# Patient Record
Sex: FEMALE | Race: WHITE | NOT HISPANIC OR LATINO | Employment: UNEMPLOYED | URBAN - NONMETROPOLITAN AREA
[De-identification: names, ages, dates, MRNs, and addresses within clinical notes are randomized per-mention and may not be internally consistent; named-entity substitution may affect disease eponyms.]

---

## 2017-07-21 ENCOUNTER — HISTORY (OUTPATIENT)
Dept: EMERGENCY MEDICINE | Facility: OTHER | Age: 27
End: 2017-07-21

## 2017-07-27 ENCOUNTER — HOSPITAL ENCOUNTER (OUTPATIENT)
Dept: RADIOLOGY | Facility: OTHER | Age: 27
End: 2017-07-27
Attending: FAMILY MEDICINE

## 2017-07-27 ENCOUNTER — OFFICE VISIT - GICH (OUTPATIENT)
Dept: FAMILY MEDICINE | Facility: OTHER | Age: 27
End: 2017-07-27

## 2017-07-27 DIAGNOSIS — L24.7 IRRITANT CONTACT DERMATITIS DUE TO PLANTS, EXCEPT FOOD: ICD-10-CM

## 2017-07-27 DIAGNOSIS — M54.40 LUMBAGO WITH SCIATICA: ICD-10-CM

## 2017-07-27 ASSESSMENT — PATIENT HEALTH QUESTIONNAIRE - PHQ9: SUM OF ALL RESPONSES TO PHQ QUESTIONS 1-9: 0

## 2017-08-11 ENCOUNTER — HOSPITAL ENCOUNTER (OUTPATIENT)
Dept: PHYSICAL THERAPY | Facility: OTHER | Age: 27
Setting detail: THERAPIES SERIES
End: 2017-08-11
Attending: FAMILY MEDICINE

## 2017-08-11 DIAGNOSIS — M54.40 LUMBAGO WITH SCIATICA: ICD-10-CM

## 2017-08-18 ENCOUNTER — HOSPITAL ENCOUNTER (OUTPATIENT)
Dept: PHYSICAL THERAPY | Facility: OTHER | Age: 27
Setting detail: THERAPIES SERIES
End: 2017-08-18
Attending: FAMILY MEDICINE

## 2017-08-25 ENCOUNTER — HOSPITAL ENCOUNTER (OUTPATIENT)
Dept: PHYSICAL THERAPY | Facility: OTHER | Age: 27
Setting detail: THERAPIES SERIES
End: 2017-08-25
Attending: FAMILY MEDICINE

## 2017-09-01 ENCOUNTER — HOSPITAL ENCOUNTER (OUTPATIENT)
Dept: PHYSICAL THERAPY | Facility: OTHER | Age: 27
Setting detail: THERAPIES SERIES
End: 2017-09-01
Attending: FAMILY MEDICINE

## 2017-09-08 ENCOUNTER — HOSPITAL ENCOUNTER (OUTPATIENT)
Dept: PHYSICAL THERAPY | Facility: OTHER | Age: 27
Setting detail: THERAPIES SERIES
End: 2017-09-08
Attending: FAMILY MEDICINE

## 2017-09-11 ENCOUNTER — HOSPITAL ENCOUNTER (OUTPATIENT)
Dept: PHYSICAL THERAPY | Facility: OTHER | Age: 27
Setting detail: THERAPIES SERIES
End: 2017-09-11
Attending: FAMILY MEDICINE

## 2017-09-29 ENCOUNTER — HOSPITAL ENCOUNTER (OUTPATIENT)
Dept: PHYSICAL THERAPY | Facility: OTHER | Age: 27
Setting detail: THERAPIES SERIES
End: 2017-09-29
Attending: FAMILY MEDICINE

## 2017-10-05 ENCOUNTER — HOSPITAL ENCOUNTER (OUTPATIENT)
Dept: PHYSICAL THERAPY | Facility: OTHER | Age: 27
Setting detail: THERAPIES SERIES
End: 2017-10-05
Attending: FAMILY MEDICINE

## 2017-11-06 ENCOUNTER — AMBULATORY - GICH (OUTPATIENT)
Dept: FAMILY MEDICINE | Facility: OTHER | Age: 27
End: 2017-11-06

## 2017-11-06 DIAGNOSIS — Z23 ENCOUNTER FOR IMMUNIZATION: ICD-10-CM

## 2017-12-27 NOTE — PROGRESS NOTES
Patient Information     Patient Name MRN Sex     Yohana Robertson [id number] Female 1990      Progress Notes by Kori Berry at 2017  3:52 PM     Author:  Kori Berry  Service:  (none) Author Type:  PT- Physical Therapy Assistant     Filed:  2017  5:13 PM  Date of Service:  2017  3:52 PM Status:  Addendum     :  Kori Berry (PT- Physical Therapy Assistant)        Related Notes: Original Note by Kori Berry (PT- Physical Therapy Assistant) filed at 2017  5:13 PM            [facility]  Outpatient PT - Daily Note    Date of Service: 2017     Visit 6/10     Patient Name: Yohana Robertson   YOB: 1990   Referring MD/Provider: Dr. Horner  Medical and Treatment Diagnosis: (not recorded)  PT Treatment Diagnosis: Low back pain with radicular symptoms  Insurance: Other: IM CARE BASIC  Start of Service: 17  Certification Dates: Start of Service: 17  Medicare/MA Re-Cert Due: 10/06/17     Subjective      Current Status: Yohana reports she felt much better after last appointment. Reports that she almost undid the progress when working on the skirting on her trailer house. Reports she was able to go on a couple mile bike ride with her daughter for  open house.     Rates pain: 1/10  Describes pain: Dull achy in the low back.   Locates pain: Low back - left side     Objective    Items left blank indicate that the test was inappropriate or not meaningful at the time of evaluation and therefore not performed.    ROM/Strength:     Cerv Thor Lumbar Myotomes    L    R     L    R   Flexion   Mod limited with pain C4 Shoulder Elev.   L2 Hip Flexion 5/5 5/5   Extension   Avoids movement, major limited C5 Shoulder Abd.   L3 Knee Ext.      5/5 5/5   Left Lat. Flex   WNL  More painful than right  C6 Elbow Flexion   L4 Ankle DF 5/5 5/5   Right Lat. Flex   WNL C7 Elbow Ext.   L5 1st MTP Ext.     Left Rotation   WNL C8 Finger  Flex   S1 Ankle P.F.     Right Rotation   WNL T1 Finger Abd.   S2 Knee Flexion 5/5 5/5          Hip Abduction 5/5 5/5          Ext. Rotation       Observation:  Relaxed today with minimal discomfort. 9/8/2017 - moving in a very guarded manner    Palpation: More tender today on the right side near the belt line.     Gait:  No major change in gait pattern. 9/8/2017 - moving slower than typical for age     Today's Intervention:    5 minutes on the bike at level 2 9/11/2017 - x3 minutes then Yohana needed to stop secondary to pain     MedX Leg Press 1x10 at 80 lbs   MedX Knee Extension: 1x10 at 40 lbs progressed to 2 sets x10   MedX Hamstring Curls: 1x10 at 40 lbs progressed to 2 sets x10   MedX hip abduction: 1x10 at 20 lbs progressed to 2 sets x1    Hooklying lower trunk rotation B x10   Hooklying ab set with marching x10 steps x2 sets   Bridges 5 second hold x5    Piriformis stretch 1x10 seconds each seated: slight increase in stretch and pain.  Standing Calf Stretching: 3x15 seconds bilaterally   Seated hamstring stretching 3x15 seconds bilaterally      Ultrasound to low back on left side: Continuous, 1.0 MHz, 1.0-1.3 w/cm2 for 10 minutes - Patient denies contraindications.     Deferred secondary to time constraints: 30 minute appointment  Mini Squats: 1x10 with fatigue at 10  Standing Hip abduction: 1x10 each leg  Standing Hip Extension: 1x10 each leg    Home Exercise Program:    Seated Sciatic nerve glide - 3x10, 2x daily  Lower trunk rotation - 3x daily, 2-3 minutes  Prone on elbows 3x10, 1x daily - Felt better today. Was able to improve to press ups in prone  Piriformis stretch: 3x20-30 seconds bilaterally  Posterior pelvic tilt with marching: 3x10  Hip abduction: 3x10  Hip Extension: 3x10  Mini Squats: Starting out 3x10 every other day.     Assessment    Therapist Assessment / Clinical Impression:  Patient was able to tolerate increased activity without an increase in pain. She reported fatigue and muscle  soreness in the back. Will continue to progress her activity tolerance and improve the strength in her back and LE's. She will continue to benefit from physical therapy to improve her mobility, strength, and endurance while decreasing her pain.     Patient Specific Functional and Pain Scales (PSFS):    Clinician Instructions: Complete after the history and before the exam.    Initial Assessment: We want to know what 3 activities in your life you are unable to perform, or are having the most difficulty performing, as a result of your chief problem. Please list and score at least 3 activities that you are unable to perform, or having the most difficulty performing, because of your chief problem.   Patient Specific Activity Scoring Scheme (score one number for each activity):   Activity Score (0-10)  0= Unable to perform activity  10= Able to perform activity at same level as before injury or problem   1. Gardening 3/10   2. House cleaning 4/10   3. Riding in car for 30 minutes 5/10   4. Fishing  8/10   5.  /10   Totals:  18/40 = 45 % ability which relates to 55% impairment    Patient verbally states that they understand that the information they have provided above is current and complete to the best of their knowledge.    Patient Specific Functional Scale Modifier Scale Conversion: (patient's modifier that correlates with pt's score on PSFS): 5-CK (50% Impaired).    G codes and Modifier taken from pt completing a PSFS: completed at initial evaluation   Initial Primary G Code and Modifier:    Per the Patient's intake and/or assessment the Primary G Code is: Mobility .   The Patient's Impairment, Limitation or Restriction Modifier would be best described as: CK - 40% - 60% Impairment.     Goal Primary G Code and Modifier:    The Patient's G Code Goal would be: Mobility    The Patient's Impairment, Limitation or Restriction Modifier goal would be best described as: CI - 1% - 20% Impairment.      Goals:  Functional Short Term Goals (4 weeks):   Patient will demonstrate compliance and independence with HEP in order to promote return to prior level of function.   Patient will be able to bend over to the ground to  10 lbs with pain no greater than 4/10 in order to promote return to prior level of function.   Patient will be able to  one position for 10 minutes in order to help with household chores, such as cleaning dishes.    Functional Long Term Goals (8 weeks):   Patient will be able to ambulate outdoors for 20 minutes with no break and pain no greater than 2/10 in order to return to prior level of function.   Patient will be able to squat down to the floor to  20 lbs with pain no greater than 2/10 in order to return to prior level of function.   Patient will be able to sit for longer than 30 minutes with pain no greater than 2/10 in order to drive longer distances in a car safely and efficiently.      Response to Intervention: Fatigue following treatment today in the back and legs. No increase in pain    Plan    Treatment Plan:      Frequency:   16 visits    Duration of Treatment: 8 weeks    Planned Interventions:    Home Exercise Program development  Therapeutic Exercise (ROM & Strengthening)  Therapeutic Activities  Neuromuscular Re-education  Manual Therapy  Ultrasound  E-Stim  Gait Training  Hot Packs / Cold Pack Modalities  Vasopneumatic Compression with Cold Therapy ( Game Ready )  Phonophoresis with Ketoprofen  Iontophoresis with Dexamethasone  Mechanical Traction    Plan for next visit:  Continue with US if continuing to improve pain. Continue to progress core exercise and dynamic LE's strengthening as tolerated.     Thank you for your referral to [facility].  Please call with any questions, concerns or comments.  [phone]    The signature, of the referring medical provider, on this document indicates certification of the above prescribed plan of  care and is medically necessary.

## 2017-12-27 NOTE — PROGRESS NOTES
Patient Information     Patient Name MRN Sex     Yohana Robertson [id number] Female 1990      Progress Notes by Kori Berry at 2017 10:34 AM     Author:  Kori Berry Service:  (none) Author Type:  PT- Physical Therapy Assistant     Filed:  2017 11:24 AM Date of Service:  2017 10:34 AM Status:  Signed     :  Kori Berry (PT- Physical Therapy Assistant)            [facility]  Outpatient PT - Daily Note    Date of Service: 2017     Visit 7/10     Patient Name: Yohana Robertson   YOB: 1990   Referring MD/Provider: Dr. Horner  Medical and Treatment Diagnosis: (not recorded)  PT Treatment Diagnosis: Low back pain with radicular symptoms  Insurance: Other: IM CARE BASIC  Start of Service: 17  Certification Dates: Start of Service: 17  Medicare/MA Re-Cert Due: 10/06/17     Subjective      Current Status: Yohana feels like she is basically to 100%. Reports the last time her back hurt was when she was sick and coughing a lot. Reports she was recently able to sit for a 30 minute TV show without pain or having to shift, can stand for 10 minutes without pain, wearing jeans today without back pain, hasn't felt like she needs to stretch after waking in the AM but still does stretches anyway, able to touch the floor with her hands when standing. She also reports she ran for a short duration and rode her bike up a steep hill - all without pain.     Rates pain: 0/10  Describes pain: Dull achy in the low back.   Locates pain: Low back - left side     Objective    Items left blank indicate that the test was inappropriate or not meaningful at the time of evaluation and therefore not performed.    ROM/Strength:     Cerv Thor Lumbar Myotomes    L    R     L    R   Flexion   Mod limited with pain C4 Shoulder Elev.   L2 Hip Flexion 5/5 5/5   Extension   Avoids movement, major limited C5 Shoulder Abd.   L3 Knee Ext.      5/5 5/5   Left Lat.  Flex   WNL  More painful than right  C6 Elbow Flexion   L4 Ankle DF 5/5 5/5   Right Lat. Flex   WNL C7 Elbow Ext.   L5 1st MTP Ext.     Left Rotation   WNL C8 Finger Flex   S1 Ankle P.F.     Right Rotation   WNL T1 Finger Abd.   S2 Knee Flexion 5/5 5/5          Hip Abduction 5/5 5/5          Ext. Rotation       Observation:  Relaxed today with minimal discomfort. 9/8/2017 - moving in a very guarded manner    Palpation: More tender today on the right side near the belt line.     Gait:  No major change in gait pattern. 9/8/2017 - moving slower than typical for age     Today's Intervention:      Nustep L6 x5 minutes     Discussed continued compliance with HEP and walking program once formally discharged from PT.     MedX Leg Press 1x10 at 80 lbs progressed to 2 sets of 10 and 90#  MedX Knee Extension: 2x10 at 40 lbs progressed to 50#   MedX Hamstring Curls: 2x10 at 40 lbs   MedX hip abduction: 2x10 at 20 lbs progressed to 30#   Lumbar extension     Yohana noticed it was easier to get into and out of MedX machines.     Hooklying lower trunk rotation B x10   Hooklying ab set with marching x10 steps x2 sets   Bridges 10 second hold x10   Bridges with marching x10     Lumbar extension MedX 30# x10 with reported pain from lumbar roll, so deferred at this point  Crate lift: floor to waist height shelf: 10# and 20# x2 reps each     Standing Calf Stretching: 3x15 seconds bilaterally    Deferred:  Piriformis stretch 1x10 seconds each seated: slight increase in stretch and pain.     Seated hamstring stretching 3x15 seconds bilaterally   Mini Squats: 1x10 with fatigue at 10  Standing Hip abduction: 1x10 each leg  Standing Hip Extension: 1x10 each leg    Home Exercise Program:    Seated Sciatic nerve glide - 3x10, 2x daily  Lower trunk rotation - 3x daily, 2-3 minutes  Prone on elbows 3x10, 1x daily - Felt better today. Was able to improve to press ups in prone  Piriformis stretch: 3x20-30 seconds bilaterally  Posterior pelvic  tilt with marching: 3x10  Hip abduction: 3x10  Hip Extension: 3x10  Mini Squats: Starting out 3x10 every other day.     Date: 09/29/2017 Prepared by: Kori Berry Access Code: [email or web address]    Supine Bridge - 10 reps - 1 sets - 5-10 hold - 2x daily - 7x weekly    Supine March with Posterior Pelvic Tilt - 10 reps - 2 sets - 5 hold - 2x daily - 7x weekly    Assessment    Therapist Assessment / Clinical Impression:  Patient was able to tolerate increased activity without an increase in pain. She reported fatigue and muscle soreness in the back. Will continue to progress her activity tolerance and improve the strength in her back and LE's. She will continue to benefit from physical therapy to improve her mobility, strength, and endurance while decreasing her pain.     Patient Specific Functional and Pain Scales (PSFS):    Clinician Instructions: Complete after the history and before the exam.    Initial Assessment: We want to know what 3 activities in your life you are unable to perform, or are having the most difficulty performing, as a result of your chief problem. Please list and score at least 3 activities that you are unable to perform, or having the most difficulty performing, because of your chief problem.   Patient Specific Activity Scoring Scheme (score one number for each activity):   Activity Score (0-10)  0= Unable to perform activity  10= Able to perform activity at same level as before injury or problem   1. Gardening 3/10   2. House cleaning 4/10   3. Riding in car for 30 minutes 5/10   4. Fishing  8/10   5.  /10   Totals:  18/40 = 45 % ability which relates to 55% impairment    Patient verbally states that they understand that the information they have provided above is current and complete to the best of their knowledge.    Patient Specific Functional Scale Modifier Scale Conversion: (patient's modifier that correlates with pt's score on PSFS): 5-CK (50% Impaired).    G codes and Modifier taken from pt  completing a PSFS: completed at initial evaluation   Initial Primary G Code and Modifier:    Per the Patient's intake and/or assessment the Primary G Code is: Mobility .   The Patient's Impairment, Limitation or Restriction Modifier would be best described as: CK - 40% - 60% Impairment.     Goal Primary G Code and Modifier:    The Patient's G Code Goal would be: Mobility    The Patient's Impairment, Limitation or Restriction Modifier goal would be best described as: CI - 1% - 20% Impairment.     Goals:  Functional Short Term Goals (4 weeks):   Patient will demonstrate compliance and independence with HEP in order to promote return to prior level of function. Goal met   Patient will be able to bend over to the ground to  10 lbs with pain no greater than 4/10 in order to promote return to prior level of function.   Patient will be able to  one position for 10 minutes in order to help with household chores, such as cleaning dishes. Goal met 9/29/2017     Functional Long Term Goals (8 weeks):   Patient will be able to ambulate outdoors for 20 minutes with no break and pain no greater than 2/10 in order to return to prior level of function. Goal met   Patient will be able to squat down to the floor to  20 lbs with pain no greater than 2/10 in order to return to prior level of function.   Patient will be able to sit for longer than 30 minutes with pain no greater than 2/10 in order to drive longer distances in a car safely and efficiently. Goal met      Response to Intervention: Fatigue following treatment today in the back and legs. No increase in pain    Plan    Treatment Plan:      Frequency:   16 visits    Duration of Treatment: 8 weeks    Planned Interventions:    Home Exercise Program development  Therapeutic Exercise (ROM & Strengthening)  Therapeutic Activities  Neuromuscular Re-education  Manual Therapy  Ultrasound  E-Stim  Gait Training  Hot Packs / Cold Pack  Modalities  Vasopneumatic Compression with Cold Therapy ( Game Ready )  Phonophoresis with Ketoprofen  Iontophoresis with Dexamethasone  Mechanical Traction    Plan for next visit:  Continue with US if continuing to improve pain. Continue to progress core exercise and dynamic LE's strengthening as tolerated.     Thank you for your referral to [facility].  Please call with any questions, concerns or comments.  [phone]    The signature, of the referring medical provider, on this document indicates certification of the above prescribed plan of care and is medically necessary.

## 2017-12-27 NOTE — PROGRESS NOTES
Patient Information     Patient Name MRN Sex     Yohana Robertson [id number] Female 1990      Progress Notes by Jarocho Ospina PT at 2017 11:09 AM     Author:  Jarocho Ospina PT Service:  (none) Author Type:  PT- Physical Therapist     Filed:  2017 12:44 PM Date of Service:  2017 11:09 AM Status:  Signed     :  Jarocho Ospina PT (PT- Physical Therapist)            [facility]  Outpatient PT - Daily Note    Date of Service: 2017     Visit 4/10     Patient Name: Yohana Robertson   YOB: 1990   Referring MD/Provider: Dr. Horner  Medical and Treatment Diagnosis: (not recorded)  PT Treatment Diagnosis: Low back pain with radicular symptoms  Insurance: Other:  CARE BASIC  Start of Service: 17  Certification Dates: Start of Service: 17  Medicare/MA Re-Cert Due: 10/06/17     Subjective      Current Status: Back is feeling really good today. Woke up and didn't have any pain. Has been doing more yard work and gardening without much pain. Denies pain into the legs. Says that she had some discomfort yesterday but more on the right side.     Rates pain: 0/10  Describes pain: Dull achy in the low back.   Locates pain: Low back     Objective    Items left blank indicate that the test was inappropriate or not meaningful at the time of evaluation and therefore not performed.    ROM/Strength:     Cerv Thor Lumbar Myotomes    L    R     L    R   Flexion   Mod limited with pain C4 Shoulder Elev.   L2 Hip Flexion 5/5 5/5   Extension   Avoids movement, major limited C5 Shoulder Abd.   L3 Knee Ext.      5/5 5/5   Left Lat. Flex   WNL  More painful than right  C6 Elbow Flexion   L4 Ankle DF 5/5 5/5   Right Lat. Flex   WNL C7 Elbow Ext.   L5 1st MTP Ext.     Left Rotation   WNL C8 Finger Flex   S1 Ankle P.F.     Right Rotation   WNL T1 Finger Abd.   S2 Knee Flexion 5/5 5/5          Hip Abduction 5/5 5/5          Ext. Rotation       Observation:   Relaxed today with minimal discomfort.     Palpation: More tender today on the right side near the belt line.     Gait:  No major change in gait pattern.     Today's Intervention:    5 minutes on the bike at level 2  Piriformis stretch 1x10 seconds each seated: slight increase in stretch and pain.   Mini Squats: 1x10 with fatigue at 10  Standing Hip abduction: 1x10 each leg  Standing Hip Extension: 1x10 each leg  Calf Stretching: 3x15 seconds bilaterally   Seated hamstring stretching 3x15 seconds bilaterally   MedX Knee Extension: 1x10 at 40 lbs  MedX Hamstring Curls: 1x10 at 40 lbs  MedX hip abduction: 1x10 at 20 lbs    Ultrasound to low back on right side: Continuous, 1.0 MHz, 1.0-1.3 w/cm2 for 8 minutes -Patient denies contraindications.     Home Exercise Program:    Seated Sciatic nerve glide - 3x10, 2x daily  Lower trunk rotation - 3x daily, 2-3 minutes  Prone on elbows 3x10, 1x daily - Felt better today. Was able to improve to press ups in prone  Piriformis stretch: 3x20-30 seconds bilaterally  Posterior pelvic tilt with marching: 3x10  Hip abduction: 3x10  Hip Extension: 3x10  Mini Squats: Starting out 3x10 every other day.     Assessment    Therapist Assessment / Clinical Impression:  Patient was able to tolerate increased activity without an increase in pain. She reported fatigue and muscle soreness in the back. Will continue to progress her activity tolerance and improve the strength in her back and LE's. She will continue to benefit from physical therapy to improve her mobility, strength, and endurance while decreasing her pain.     Patient Specific Functional and Pain Scales (PSFS):    Clinician Instructions: Complete after the history and before the exam.    Initial Assessment: We want to know what 3 activities in your life you are unable to perform, or are having the most difficulty performing, as a result of your chief problem. Please list and score at least 3 activities that you are unable to  perform, or having the most difficulty performing, because of your chief problem.   Patient Specific Activity Scoring Scheme (score one number for each activity):   Activity Score (0-10)  0= Unable to perform activity  10= Able to perform activity at same level as before injury or problem   1. Gardening 3/10   2. House cleaning 4/10   3. Riding in car for 30 minutes 5/10   4. Fishing  8/10   5.  /10   Totals:  18/40 = 45 % ability which relates to 55% impairment    Patient verbally states that they understand that the information they have provided above is current and complete to the best of their knowledge.    Patient Specific Functional Scale Modifier Scale Conversion: (patient's modifier that correlates with pt's score on PSFS): 5-CK (50% Impaired).    G codes and Modifier taken from pt completing a PSFS: completed at initial evaluation   Initial Primary G Code and Modifier:    Per the Patient's intake and/or assessment the Primary G Code is: Mobility .   The Patient's Impairment, Limitation or Restriction Modifier would be best described as: CK - 40% - 60% Impairment.     Goal Primary G Code and Modifier:    The Patient's G Code Goal would be: Mobility    The Patient's Impairment, Limitation or Restriction Modifier goal would be best described as: CI - 1% - 20% Impairment.     Goals:  Functional Short Term Goals (4 weeks):   Patient will demonstrate compliance and independence with HEP in order to promote return to prior level of function.   Patient will be able to bend over to the ground to  10 lbs with pain no greater than 4/10 in order to promote return to prior level of function.   Patient will be able to  one position for 10 minutes in order to help with household chores, such as cleaning dishes.    Functional Long Term Goals (8 weeks):   Patient will be able to ambulate outdoors for 20 minutes with no break and pain no greater than 2/10 in order to return to prior level of  function.   Patient will be able to squat down to the floor to  20 lbs with pain no greater than 2/10 in order to return to prior level of function.   Patient will be able to sit for longer than 30 minutes with pain no greater than 2/10 in order to drive longer distances in a car safely and efficiently.      Response to Intervention: Fatigue following treatment today in the back and legs. No increase in pain    Plan    Treatment Plan:      Frequency:   16 visits    Duration of Treatment: 8 weeks    Planned Interventions:    Home Exercise Program development  Therapeutic Exercise (ROM & Strengthening)  Therapeutic Activities  Neuromuscular Re-education  Manual Therapy  Ultrasound  E-Stim  Gait Training  Hot Packs / Cold Pack Modalities  Vasopneumatic Compression with Cold Therapy ( Game Ready )  Phonophoresis with Ketoprofen  Iontophoresis with Dexamethasone  Mechanical Traction    Plan for next visit:  Continue with US if continuing to improve pain. Continue to progress core exercise and dynamic LE's strengthening as tolerated.     Thank you for your referral to [facility].  Please call with any questions, concerns or comments.  [phone]    The signature, of the referring medical provider, on this document indicates certification of the above prescribed plan of care and is medically necessary.    Jarocho Ospina, PT ....................  9/1/2017   12:44 PM

## 2017-12-27 NOTE — PROGRESS NOTES
Patient Information     Patient Name MRN Sex     Yohana Robertson [id number] Female 1990      Progress Notes by Jarocho Ospina PT at 2017 11:21 AM     Author:  Jarocho Ospina PT Service:  (none) Author Type:  PT- Physical Therapist     Filed:  2017 12:51 PM Date of Service:  2017 11:21 AM Status:  Signed     :  Jarocho Ospina PT (PT- Physical Therapist)            [facility]  Outpatient PT - Daily Note    Date of Service: 2017     Visit 2/10    Patient Name: Yohana Robertson   YOB: 1990   Referring MD/Provider: Dr. Horner  Medical and Treatment Diagnosis: (not recorded)  PT Treatment Diagnosis: Low back pain with radicular symptoms  Insurance: Other:  CARE BASIC  Start of Service: 17  Certification Dates: Start of Service: 17  Medicare/MA Re-Cert Due: 10/06/17     Subjective      Current Status: Patient is doing ok today. States that she probably over did it last weekend and she was more sore at the start of the week. The stretching has helped with her migraines. Symptoms in left leg have decreased and centralized towards her left hip and low back.     Rates pain: 5/10  Describes pain: Increased pressure in low back, primarily on left, numbness and tingling down the left leg.   Locates pain: left low back radiating into left LE.     Objective    Items left blank indicate that the test was inappropriate or not meaningful at the time of evaluation and therefore not performed.    ROM/Strength:     Cerv Thor Lumbar Myotomes    L    R     L    R   Flexion   Mod limited with pain C4 Shoulder Elev.   L2 Hip Flexion 5/5 5/5   Extension   Avoids movement, major limited C5 Shoulder Abd.   L3 Knee Ext.      5/5 5/5   Left Lat. Flex   WNL  More painful than right  C6 Elbow Flexion   L4 Ankle DF 5/5 5/5   Right Lat. Flex   WNL C7 Elbow Ext.   L5 1st MTP Ext.     Left Rotation   WNL C8 Finger Flex   S1 Ankle P.F.     Right  Rotation   WNL T1 Finger Abd.   S2 Knee Flexion 5/5 5/5          Hip Abduction 5/5 5/5          Ext. Rotation       Observation:  She still is slow moving and appears somewhat fearful of moving in certain directions.     Palpation: Tender along the left side of her low back. Irritable to palpation and manual therapy.     Gait:  No major change in gait pattern.     Today's Intervention:    5 minutes on the bike at level 1  Piriformis stretch 3x15 seconds each  Posterior pelvic tilts 2x10 with cueing on technique to engage core and proper technique   Seated Sciatic nerve glide 2x10   Lower trunk rotation for 2 mintues  Calf Stretching: 3x15 seconds bilaterally working towards 30 seconds  Seated hamstring stretching 3x15 seconds bilaterally working towards 30 seconds    Ultrasound to low back on left side: Continuous, 1.0 MHz, 1.0-1.3 w/cm2 for 8 minutes - reported less stiffness in low back. Patient denies contraindications. Had mild dizziness after sitting up from prone position after ultrasound. Improved with standing rest.     Home Exercise Program:    Seated Sciatic nerve glide - 3x10, 2x daily  Lower trunk rotation - 3x daily, 2-3 minutes  Prone on elbows 3x10, 1x daily - increased her pain. She was told to defer for now but not to be afraid to work into extension to stretch  Piriformis stretch: 3x20-30 seconds bilaterally  Posterior pelvic tilt: 3x10    Assessment    Therapist Assessment / Clinical Impression:  Patient was irritable to manual therapy today in the low back. She has had ultrasound in past and was willing to try again. Symptoms are centralizing on her left side from the exercise and stretching that she is doing at home. She will continue to benefit from physical therapy to improve her mobility, strength, and endurance while decreasing her pain.     Patient Specific Functional and Pain Scales (PSFS):    Clinician Instructions: Complete after the history and before the exam.    Initial Assessment: We  want to know what 3 activities in your life you are unable to perform, or are having the most difficulty performing, as a result of your chief problem. Please list and score at least 3 activities that you are unable to perform, or having the most difficulty performing, because of your chief problem.   Patient Specific Activity Scoring Scheme (score one number for each activity):   Activity Score (0-10)  0= Unable to perform activity  10= Able to perform activity at same level as before injury or problem   1. Gardening 3/10   2. House cleaning 4/10   3. Riding in car for 30 minutes 5/10   4. Fishing  8/10   5.  /10   Totals:  18/40 = 45 % ability which relates to 55% impairment    Patient verbally states that they understand that the information they have provided above is current and complete to the best of their knowledge.    Patient Specific Functional Scale Modifier Scale Conversion: (patient's modifier that correlates with pt's score on PSFS): 5-CK (50% Impaired).    G codes and Modifier taken from pt completing a PSFS: completed at initial evaluation   Initial Primary G Code and Modifier:    Per the Patient's intake and/or assessment the Primary G Code is: Mobility .   The Patient's Impairment, Limitation or Restriction Modifier would be best described as: CK - 40% - 60% Impairment.     Goal Primary G Code and Modifier:    The Patient's G Code Goal would be: Mobility    The Patient's Impairment, Limitation or Restriction Modifier goal would be best described as: CI - 1% - 20% Impairment.     Goals:  Functional Short Term Goals (4 weeks):   Patient will demonstrate compliance and independence with HEP in order to promote return to prior level of function.   Patient will be able to bend over to the ground to  10 lbs with pain no greater than 4/10 in order to promote return to prior level of function.   Patient will be able to  one position for 10 minutes in order to help with household  chores, such as cleaning dishes.    Functional Long Term Goals (8 weeks):   Patient will be able to ambulate outdoors for 20 minutes with no break and pain no greater than 2/10 in order to return to prior level of function.   Patient will be able to squat down to the floor to  20 lbs with pain no greater than 2/10 in order to return to prior level of function.   Patient will be able to sit for longer than 30 minutes with pain no greater than 2/10 in order to drive longer distances in a car safely and efficiently.      Response to Intervention: She responded well to therapeutic exercise. Stated that she felt less stiff after stretching and supine exercises.   Plan    Treatment Plan:      Frequency:   16 visits    Duration of Treatment: 8 weeks    Planned Interventions:    Home Exercise Program development  Therapeutic Exercise (ROM & Strengthening)  Therapeutic Activities  Neuromuscular Re-education  Manual Therapy  Ultrasound  E-Stim  Gait Training  Hot Packs / Cold Pack Modalities  Vasopneumatic Compression with Cold Therapy ( Game Ready )  Phonophoresis with Ketoprofen  Iontophoresis with Dexamethasone  Mechanical Traction    Plan for next visit:  Assess response to ultrasound and new stretching and core exercise. Progress HEP as tolerated.     Thank you for your referral to [facility].  Please call with any questions, concerns or comments.  [phone]    The signature, of the referring medical provider, on this document indicates certification of the above prescribed plan of care and is medically necessary.    Jarocho Ospina, PT ....................  8/18/2017   12:51 PM

## 2017-12-28 NOTE — PROGRESS NOTES
Patient Information     Patient Name MRN Sex     Yohana Robertson [id number] Female 1990      Progress Notes by Kori Berry at 2017 10:33 AM     Author:  Kori Berry Service:  (none) Author Type:  PT- Physical Therapy Assistant     Filed:  2017 11:42 AM Date of Service:  2017 10:33 AM Status:  Signed     :  Kori Berry (PT- Physical Therapy Assistant)            [facility]  Outpatient PT - Daily Note    Date of Service: 2017     Visit 5/10     Patient Name: Yohana Robertson   YOB: 1990   Referring MD/Provider: Dr. Horner  Medical and Treatment Diagnosis: (not recorded)  PT Treatment Diagnosis: Low back pain with radicular symptoms  Insurance: Other: IM CARE BASIC  Start of Service: 17  Certification Dates: Start of Service: 17  Medicare/MA Re-Cert Due: 10/06/17     Subjective      Current Status: Yohana reports that she went to the movies last weekend with her daughter. Noticed increased pain during the movie, reports that her  made a comment about bringing her to the ER because she seemed so uncomfortable. Yohana reports that she noticed the pain going down her leg to mid-thigh with toes feeling tingly as well.     Rates pain: 2/10  Describes pain: Dull achy in the low back.   Locates pain: Low back     Objective    Items left blank indicate that the test was inappropriate or not meaningful at the time of evaluation and therefore not performed.    ROM/Strength:     Cerv Thor Lumbar Myotomes    L    R     L    R   Flexion   Mod limited with pain C4 Shoulder Elev.   L2 Hip Flexion 5/5 5/5   Extension   Avoids movement, major limited C5 Shoulder Abd.   L3 Knee Ext.      5/5 5/5   Left Lat. Flex   WNL  More painful than right  C6 Elbow Flexion   L4 Ankle DF 5/5 5/5   Right Lat. Flex   WNL C7 Elbow Ext.   L5 1st MTP Ext.     Left Rotation   WNL C8 Finger Flex   S1 Ankle P.F.     Right Rotation   WNL T1 Finger Abd.    S2 Knee Flexion 5/5 5/5          Hip Abduction 5/5 5/5          Ext. Rotation       Observation:  Relaxed today with minimal discomfort. 9/8/2017 - moving in a very guarded manner    Palpation: More tender today on the right side near the belt line.     Gait:  No major change in gait pattern. 9/8/2017 - moving slower than typical for age     Today's Intervention:    5 minutes on the bike at level 2 9/8/2017 - x3.5 minutes secondary to time constraints   MedX Knee Extension: 1x10 at 40 lbs  MedX Hamstring Curls: 1x10 at 40 lbs  MedX hip abduction: 1x10 at 20 lbs    Piriformis stretch 1x10 seconds each seated: slight increase in stretch and pain.  Standing Calf Stretching: 3x15 seconds bilaterally   Seated hamstring stretching 3x15 seconds bilaterally      Ultrasound to low back on right side: Continuous, 1.0 MHz, 1.0-1.3 w/cm2 for 8 minutes - Patient denies contraindications.     Discussed use of home pain modalities as needed.     Deferred secondary to time constraints: 30 minute appointment  Mini Squats: 1x10 with fatigue at 10  Standing Hip abduction: 1x10 each leg  Standing Hip Extension: 1x10 each leg    Home Exercise Program:    Seated Sciatic nerve glide - 3x10, 2x daily  Lower trunk rotation - 3x daily, 2-3 minutes  Prone on elbows 3x10, 1x daily - Felt better today. Was able to improve to press ups in prone  Piriformis stretch: 3x20-30 seconds bilaterally  Posterior pelvic tilt with marching: 3x10  Hip abduction: 3x10  Hip Extension: 3x10  Mini Squats: Starting out 3x10 every other day.     Assessment    Therapist Assessment / Clinical Impression:  Patient was able to tolerate increased activity without an increase in pain. She reported fatigue and muscle soreness in the back. Will continue to progress her activity tolerance and improve the strength in her back and LE's. She will continue to benefit from physical therapy to improve her mobility, strength, and endurance while decreasing her pain.     Patient  Specific Functional and Pain Scales (PSFS):    Clinician Instructions: Complete after the history and before the exam.    Initial Assessment: We want to know what 3 activities in your life you are unable to perform, or are having the most difficulty performing, as a result of your chief problem. Please list and score at least 3 activities that you are unable to perform, or having the most difficulty performing, because of your chief problem.   Patient Specific Activity Scoring Scheme (score one number for each activity):   Activity Score (0-10)  0= Unable to perform activity  10= Able to perform activity at same level as before injury or problem   1. Gardening 3/10   2. House cleaning 4/10   3. Riding in car for 30 minutes 5/10   4. Fishing  8/10   5.  /10   Totals:  18/40 = 45 % ability which relates to 55% impairment    Patient verbally states that they understand that the information they have provided above is current and complete to the best of their knowledge.    Patient Specific Functional Scale Modifier Scale Conversion: (patient's modifier that correlates with pt's score on PSFS): 5-CK (50% Impaired).    G codes and Modifier taken from pt completing a PSFS: completed at initial evaluation   Initial Primary G Code and Modifier:    Per the Patient's intake and/or assessment the Primary G Code is: Mobility .   The Patient's Impairment, Limitation or Restriction Modifier would be best described as: CK - 40% - 60% Impairment.     Goal Primary G Code and Modifier:    The Patient's G Code Goal would be: Mobility    The Patient's Impairment, Limitation or Restriction Modifier goal would be best described as: CI - 1% - 20% Impairment.     Goals:  Functional Short Term Goals (4 weeks):   Patient will demonstrate compliance and independence with HEP in order to promote return to prior level of function.   Patient will be able to bend over to the ground to  10 lbs with pain no greater than 4/10 in  order to promote return to prior level of function.   Patient will be able to  one position for 10 minutes in order to help with household chores, such as cleaning dishes.    Functional Long Term Goals (8 weeks):   Patient will be able to ambulate outdoors for 20 minutes with no break and pain no greater than 2/10 in order to return to prior level of function.   Patient will be able to squat down to the floor to  20 lbs with pain no greater than 2/10 in order to return to prior level of function.   Patient will be able to sit for longer than 30 minutes with pain no greater than 2/10 in order to drive longer distances in a car safely and efficiently.      Response to Intervention: Fatigue following treatment today in the back and legs. No increase in pain    Plan    Treatment Plan:      Frequency:   16 visits    Duration of Treatment: 8 weeks    Planned Interventions:    Home Exercise Program development  Therapeutic Exercise (ROM & Strengthening)  Therapeutic Activities  Neuromuscular Re-education  Manual Therapy  Ultrasound  E-Stim  Gait Training  Hot Packs / Cold Pack Modalities  Vasopneumatic Compression with Cold Therapy ( Game Ready )  Phonophoresis with Ketoprofen  Iontophoresis with Dexamethasone  Mechanical Traction    Plan for next visit:  Continue with US if continuing to improve pain. Continue to progress core exercise and dynamic LE's strengthening as tolerated.     Thank you for your referral to [facility].  Please call with any questions, concerns or comments.  [phone]    The signature, of the referring medical provider, on this document indicates certification of the above prescribed plan of care and is medically necessary.

## 2017-12-28 NOTE — PROGRESS NOTES
Patient Information     Patient Name MRN Sex     Yohana Robertson [id number] Female 1990      Progress Notes by Jarocho Ospina PT at 2017 11:16 AM     Author:  Jarocho Ospina PT Service:  (none) Author Type:  PT- Physical Therapist     Filed:  2017  1:20 PM Date of Service:  2017 11:16 AM Status:  Signed     :  Jarocho Ospina PT (PT- Physical Therapist)            [facility]  Outpatient PT - Daily Note    Date of Service: 2017     Visit 3/10     Patient Name: Yohana Robertson   YOB: 1990   Referring MD/Provider: Dr. Horner  Medical and Treatment Diagnosis: (not recorded)  PT Treatment Diagnosis: Low back pain with radicular symptoms  Insurance: Other:  CARE BASIC  Start of Service: 17  Certification Dates: Start of Service: 17  Medicare/MA Re-Cert Due: 10/06/17     Subjective      Current Status: Feeling better today. She had a 90 minute massage from a friend the other day and she said the stretching has made her pain better. Rates her pain at 3/10. Was able to ride her bike and be more active over the past week.     Rates pain: 3/10  Describes pain: Dull achy in the low back.   Locates pain: left low back radiating into left LE.     Objective    Items left blank indicate that the test was inappropriate or not meaningful at the time of evaluation and therefore not performed.    ROM/Strength:     Cerv Thor Lumbar Myotomes    L    R     L    R   Flexion   Mod limited with pain C4 Shoulder Elev.   L2 Hip Flexion 5/5 5/5   Extension   Avoids movement, major limited C5 Shoulder Abd.   L3 Knee Ext.      5/5 5/5   Left Lat. Flex   WNL  More painful than right  C6 Elbow Flexion   L4 Ankle DF 5/5 5/5   Right Lat. Flex   WNL C7 Elbow Ext.   L5 1st MTP Ext.     Left Rotation   WNL C8 Finger Flex   S1 Ankle P.F.     Right Rotation   WNL T1 Finger Abd.   S2 Knee Flexion 5/5 5/5          Hip Abduction 5/5 5/5          Ext.  Rotation       Observation:  Doing much better today with movement. Appears much less apprehensive to get into positions, most noticeably lumbar extension.     Palpation: Still having some tenderness on the left side of lumbar spine, near the belt line     Gait:  No major change in gait pattern.     Today's Intervention:    5 minutes on the bike at level 1  Piriformis stretch 3x15 seconds each  Posterior pelvic tilts 1x10  PPT with marching: 1x10  Prone Press up: 1x10  Calf Stretching: 3x15 seconds bilaterally working towards 30 seconds  Seated hamstring stretching 3x15 seconds bilaterally working towards 30 seconds    Ultrasound to low back on left side: Continuous, 1.0 MHz, 1.0-1.3 w/cm2 for 8 minutes - reported less stiffness in low back. Patient denies contraindications.     Home Exercise Program:    Seated Sciatic nerve glide - 3x10, 2x daily  Lower trunk rotation - 3x daily, 2-3 minutes  Prone on elbows 3x10, 1x daily - Felt better today. Was able to improve to press ups in prone  Piriformis stretch: 3x20-30 seconds bilaterally  Posterior pelvic tilt with marching: 3x10    Assessment    Therapist Assessment / Clinical Impression:  Patient showing signs of improved pain. Still having mild discomfort in her low back on the left but her symptoms are continuing to centralize. She is still showing some signs of apprehension when it comes to more dynamic movements and activities. She will continue to benefit from physical therapy to improve her mobility, strength, and endurance while decreasing her pain.     Patient Specific Functional and Pain Scales (PSFS):    Clinician Instructions: Complete after the history and before the exam.    Initial Assessment: We want to know what 3 activities in your life you are unable to perform, or are having the most difficulty performing, as a result of your chief problem. Please list and score at least 3 activities that you are unable to perform, or having the most difficulty  performing, because of your chief problem.   Patient Specific Activity Scoring Scheme (score one number for each activity):   Activity Score (0-10)  0= Unable to perform activity  10= Able to perform activity at same level as before injury or problem   1. Gardening 3/10   2. House cleaning 4/10   3. Riding in car for 30 minutes 5/10   4. Fishing  8/10   5.  /10   Totals:  18/40 = 45 % ability which relates to 55% impairment    Patient verbally states that they understand that the information they have provided above is current and complete to the best of their knowledge.    Patient Specific Functional Scale Modifier Scale Conversion: (patient's modifier that correlates with pt's score on PSFS): 5-CK (50% Impaired).    G codes and Modifier taken from pt completing a PSFS: completed at initial evaluation   Initial Primary G Code and Modifier:    Per the Patient's intake and/or assessment the Primary G Code is: Mobility .   The Patient's Impairment, Limitation or Restriction Modifier would be best described as: CK - 40% - 60% Impairment.     Goal Primary G Code and Modifier:    The Patient's G Code Goal would be: Mobility    The Patient's Impairment, Limitation or Restriction Modifier goal would be best described as: CI - 1% - 20% Impairment.     Goals:  Functional Short Term Goals (4 weeks):   Patient will demonstrate compliance and independence with HEP in order to promote return to prior level of function.   Patient will be able to bend over to the ground to  10 lbs with pain no greater than 4/10 in order to promote return to prior level of function.   Patient will be able to  one position for 10 minutes in order to help with household chores, such as cleaning dishes.    Functional Long Term Goals (8 weeks):   Patient will be able to ambulate outdoors for 20 minutes with no break and pain no greater than 2/10 in order to return to prior level of function.   Patient will be able to squat down  to the floor to  20 lbs with pain no greater than 2/10 in order to return to prior level of function.   Patient will be able to sit for longer than 30 minutes with pain no greater than 2/10 in order to drive longer distances in a car safely and efficiently.      Response to Intervention: No increase in pain following activities.      Plan    Treatment Plan:      Frequency:   16 visits    Duration of Treatment: 8 weeks    Planned Interventions:    Home Exercise Program development  Therapeutic Exercise (ROM & Strengthening)  Therapeutic Activities  Neuromuscular Re-education  Manual Therapy  Ultrasound  E-Stim  Gait Training  Hot Packs / Cold Pack Modalities  Vasopneumatic Compression with Cold Therapy ( Game Ready )  Phonophoresis with Ketoprofen  Iontophoresis with Dexamethasone  Mechanical Traction    Plan for next visit:  Continue with US if continuing to improve pain. Progress to more standing LE/low back exercise as able.     Thank you for your referral to [facility].  Please call with any questions, concerns or comments.  [phone]    The signature, of the referring medical provider, on this document indicates certification of the above prescribed plan of care and is medically necessary.    Jarocho Ospina, PT ....................  8/25/2017   1:20 PM

## 2017-12-28 NOTE — PROGRESS NOTES
Patient Information     Patient Name MRN Sex Yohana Harvey [id number] Female 1990      Progress Notes by Jarocho Ospina PT at 10/5/2017 11:16 AM     Author:  Jarocho Ospina PT Service:  (none) Author Type:  PT- Physical Therapist     Filed:  10/5/2017 12:44 PM Date of Service:  10/5/2017 11:16 AM Status:  Signed     :  Jarocho Ospina PT (PT- Physical Therapist)            [facility]  Outpatient PT - Daily Note/Progress Note    Date of Service: 10/5/2017   G-Codes Updated: 10/5/2017   Visit 8/10     Patient Name: Yohana Robertson   YOB: 1990   Referring MD/Provider: Dr. Horner  Medical and Treatment Diagnosis: (not recorded)  PT Treatment Diagnosis: Low back pain with radicular symptoms  Insurance: Other: IM CARE BASIC  Start of Service: 17  Certification Dates: Start of Service: 17  Medicare/MA Re-Cert Due: 10/06/17     Subjective      Current Status: Patient is feeling really good. States that she is in less pain overall most of the time. Rates her pain today at 0/10. She is managing well on her own. Reports that her migraines have become less frequent. 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 She would like to hold her chart open for a few weeks and attempt independent management of her symptoms. Believes she is overall 80 % improved.     Rates pain: 0/10  Describes pain: Dull achy in the low back.   Locates pain: Low back - left side     Objective    Items left blank indicate that the test was inappropriate or not meaningful at the time of evaluation and therefore not performed.    ROM/Strength:     Cerv Thor Lumbar Myotomes    L    R     L    R   Flexion   WNL C4 Shoulder Elev.   L2 Hip Flexion 5/5 5/5   Extension   WNL with no pain C5 Shoulder Abd.   L3 Knee Ext.      5/5 5/5   Left Lat. Flex   WNL   C6 Elbow Flexion   L4 Ankle DF 5/5 5/5   Right Lat. Flex   WNL C7 Elbow Ext.    L5 1st MTP Ext.     Left Rotation   WNL C8 Finger Flex   S1 Ankle P.F.     Right Rotation   WNL T1 Finger Abd.   S2 Knee Flexion 5/5 5/5          Hip Abduction 5/5 5/5          Ext. Rotation       Observation:  Relaxed today with minimal discomfort. 9/8/2017 - moving in a very guarded manner    Palpation: More tender today on the right side near the belt line.     Gait: Slower gait speed    Today's Intervention:    Bike x5 minutes on level 3    MedX Leg Press 2x10 at 100 lbs  MedX Knee Extension: 1x10 at 80 lbs   MedX hip abduction: 2x10 at 40 lbs     Hooklying ab set with marching x10 steps x2 sets   Bridges with marching x10   Bridging: 1x10     Crate lift: floor to waist height shelf: 20# x3 reps     Planks: 3x15 second holds on knees and elbows - mild discomfort in low back  Sidelying planks: couldn't complete in right sidelying. Perform isometric reps of lateral flexion. 1x10 on left side for full concentric contraction  Ball walk outs with core engagement: 1x10    Deferred:  Piriformis stretch 1x10 seconds each seated: slight increase in stretch and pain.     Seated hamstring stretching 3x15 seconds bilaterally   Mini Squats: 1x10 with fatigue at 10  Standing Hip abduction: 1x10 each leg  Standing Hip Extension: 1x10 each leg    Home Exercise Program:    Seated Sciatic nerve glide - 3x10, 2x daily  Lower trunk rotation - 3x daily, 2-3 minutes  Prone on elbows 3x10, 1x daily - Felt better today. Was able to improve to press ups in prone  Piriformis stretch: 3x20-30 seconds bilaterally  Posterior pelvic tilt with marching: 3x10  Hip abduction: 3x10  Hip Extension: 3x10  Mini Squats: Starting out 3x10 every other day.     Date: 09/29/2017 Prepared by: Kori Berry Access Code: [email or web address]    Supine Bridge - 10 reps - 1 sets - 5-10 hold - 2x daily - 7x weekly    Supine March with Posterior Pelvic Tilt - 10 reps - 2 sets - 5 hold - 2x daily - 7x weekly    Assessment    Therapist Assessment / Clinical Impression:   Patient has made significant improvements from evaluation. Her mobility has significantly improved along with her endurance and activity tolerance. She is no longer seeing many limitations at home and with her children. She believes that she is 80% improved since the initial onset of injury.     Patient Specific Functional and Pain Scales (PSFS):    Clinician Instructions: Complete after the history and before the exam.    Initial Assessment: We want to know what 3 activities in your life you are unable to perform, or are having the most difficulty performing, as a result of your chief problem. Please list and score at least 3 activities that you are unable to perform, or having the most difficulty performing, because of your chief problem.   Patient Specific Activity Scoring Scheme (score one number for each activity):   Activity Score (0-10)  0= Unable to perform activity  10= Able to perform activity at same level as before injury or problem Score (0-10) (10/5/2017)  0= Unable to perform activity  10= Able to perform activity at same level as before injury or problem   1. Gardening 3/10 9/10   2. House cleaning 4/10 10/10   3. Riding in car for 30 minutes 5/10 10/10   4. Fishing  8/10 10/10   5.  /10    Totals:  18/40 = 45 % ability which relates to 55% impairment 39/40 = 97 % ability which relates to 3% impairment    Patient verbally states that they understand that the information they have provided above is current and complete to the best of their knowledge    G codes and Modifier based on patient's presentation, PSFS, and clinical judgement:     Current Primary G Code and Modifier:    Per the Patient's intake and/or assessment the Primary G Code is: Mobility .   The Patient's Impairment, Limitation or Restriction Modifier would be best described as: CI - 1% - 20% Impairment.     Goal Primary G Code and Modifier:    The Patient's G Code Goal would be: Mobility    The Patient's Impairment, Limitation  or Restriction Modifier goal would be best described as: CI - 1% - 20% Impairment.     Goals:  Functional Short Term Goals (4 weeks):   Patient will demonstrate compliance and independence with HEP in order to promote return to prior level of function. Goal met   Patient will be able to bend over to the ground to  10 lbs with pain no greater than 4/10 in order to promote return to prior level of function. Goal Met: 10/5/2017   Patient will be able to  one position for 10 minutes in order to help with household chores, such as cleaning dishes. Goal met 9/29/2017     Functional Long Term Goals (8 weeks):   Patient will be able to ambulate outdoors for 20 minutes with no break and pain no greater than 2/10 in order to return to prior level of function. Goal met   Patient will be able to squat down to the floor to  20 lbs with pain no greater than 2/10 in order to return to prior level of function. Goal Met: 10/5/2017   Patient will be able to sit for longer than 30 minutes with pain no greater than 2/10 in order to drive longer distances in a car safely and efficiently. Goal met      Response to Intervention: No complaints of discomfort or soreness following treatment. Tolerated more resistance on strengthening and increase core activation with no issues.     Plan    Treatment Plan:      Frequency:   16 visits    Duration of Treatment: 8 weeks    Planned Interventions:    Home Exercise Program development  Therapeutic Exercise (ROM & Strengthening)  Therapeutic Activities  Neuromuscular Re-education  Manual Therapy  Ultrasound  E-Stim  Gait Training  Hot Packs / Cold Pack Modalities  Vasopneumatic Compression with Cold Therapy ( Game Ready )  Phonophoresis with Ketoprofen  Iontophoresis with Dexamethasone  Mechanical Traction    Plan for next visit:  Hold chart open for a few weeks. If patient does not call back to set up another appointment in that time, d/c patient. If new appointment set up,  reassess mobility and strength. Decrease pain as indicated and improve mobility and HEP as tolerated.     Thank you for your referral to [facility].  Please call with any questions, concerns or comments.  [phone]    The signature, of the referring medical provider, on this document indicates certification of the above prescribed plan of care and is medically necessary.

## 2017-12-28 NOTE — PROGRESS NOTES
Patient Information     Patient Name MRN Sex Yohana Harvey [id number] Female 1990      Progress Notes by Rocky Horner MD at 2017  3:30 PM     Author:  Rocky Horner MD Service:  (none) Author Type:  Physician     Filed:  2017  2:36 PM Encounter Date:  2017 Status:  Signed     :  Rocky Horner MD (Physician)            SUBJECTIVE:    Yohana Robertson is a 26 y.o. female who presents for ER follow-up for back strain    HPI Comments: Patient arrives here for an ER follow-up. Patient states Friday she was getting the kids to bed when she was bending over. She stood up but developed sudden onset of lower back pain. States that it has slowly gotten worse. She did go to the ER on the  for both a back pain and poison sumac. She was started on prednisone and Advair along with hydrocodone and Flexeril. She states the rash has improving but the pain has persisted. She also reports numbness to the top of her foot. She states that the pain goes down her left leg. And it shooting in nature. That started about 2 days ago and slowly getting worse. She also reports that she has pain going down the right side of her lower back. Prednisone has not helped. She does report the hydrocodone has. She does have a history of dysfunctional thoracic and cervical and lumbar back pain in the past. Irritable bowel syndrome. Anxiety and depression. There is no change in bowel or bladder habits. Movement seems to make it worse.      Allergies     Allergen  Reactions     Codeine Hives and Shortness Of Breath     Peanuts [Peanut] Shortness Of Breath   ,   Family History       Problem   Relation Age of Onset     Heart Disease  Mother      Aortic stenosis and valve replaced       Good Health  Father      Asthma  Maternal Grandmother      Heart Disease  Maternal Grandfather    ,   Current Outpatient Prescriptions on File Prior to Visit       Medication  Sig Dispense Refill     cyclobenzaprine  (FLEXERIL) 10 mg tablet Take 1 tablet by mouth 3 times daily. 30 tablet 0     HYDROcodone-acetaminophen, 5-325 mg, (NORCO) per tablet Take 1-2 tablets by mouth every 6 hours if needed  for Pain Max acetaminophen dose: 4000 mg in 24 hrs. 30 tablet 0     hydrocortisone 1 % cream Apply  topically to affected area(s) 2 times daily. 1 Tube 3     ibuprofen (ADVIL; MOTRIN) 600 mg tablet Take 1 tablet by mouth 4 times daily if needed for Pain. Maximum of 3200 mg in 24 hours. 60 tablet 0     predniSONE (DELTASONE) 10 mg tablet 40 mg po daily x 3 days, then 30 mg po daily x 3 days, then 20 mg po daily x 3 days and finally 10 mg po daily until finished. 30 tablet 0     No current facility-administered medications on file prior to visit.    ,   Past Surgical History:      Procedure  Laterality Date     BUNIONECTOMY  4/2005     Left       COLONOSCOPY SCREENING  7/2013    Normal       FRACTURE TREATMENT  6/13    L Foot-tarsometatarsal realignment arthrodesis with plate and screw fixation, capsule tendon balancing procedures about the first tarsometatarsal joint, Landry Gibson DPM  [facility]       REMOVAL OF FOREIGN BODY  7/26/2006    Removal of harware L foot after bunionectomy [Other][[[[       TONSILLECTOMY  12/2005   ,   Social History       Substance Use Topics         Smoking status:  Current Every Day Smoker      Packs/day: 0.50      Years: 9.00      Types: Cigarettes      Smokeless tobacco:  Never Used      Alcohol use  12.6 oz/week     21 Standard drinks or equivalent per week     and   Social History       Substance Use Topics         Smoking status:  Current Every Day Smoker      Packs/day: 0.50      Years: 9.00      Types: Cigarettes      Smokeless tobacco:  Never Used      Alcohol use  12.6 oz/week     21 Standard drinks or equivalent per week        REVIEW OF SYSTEMS:  Review of Systems   Constitutional: Negative for chills and fever.   Gastrointestinal: Negative.         Denies any incontinence of  stool   Genitourinary: Negative.         Denies any difficulty or incontinence of urine   Musculoskeletal: Positive for back pain.   Skin: Positive for rash.   Neurological: Positive for tingling and sensory change. Negative for dizziness and focal weakness.   Psychiatric/Behavioral: Negative for depression.       OBJECTIVE:  /64  Pulse 52  Wt 72.5 kg (159 lb 12.8 oz)  LMP 07/18/2017  BMI 27.43 kg/m2    EXAM:   Physical Exam   Constitutional: She is well-developed, well-nourished, and in no distress.   HENT:   Head: Normocephalic.   Cardiovascular: Normal rate and regular rhythm.    Pulmonary/Chest: Effort normal.   Musculoskeletal:   Patient is able to stand on toes and heels. Walks on toes and heels without difficulty. Squats normally.   Neurological: She is alert. She displays normal reflexes. She exhibits normal muscle tone.   Skin:   There is resolving excoriations and rash on her upper arms.       ASSESSMENT/PLAN:    ICD-10-CM    1. Acute bilateral low back pain with sciatica, sciatica laterality unspecified M54.40 XR SPINE LUMBAR 3 VIEWS      etodolac (LODINE) 500 mg tablet      AMB CONSULT TO PHYSICAL THERAPY   2. Contact dermatitis and eczema due to plant L24.7         Plan:  Lower back pain. We'll start Lodine.  x-rays of back were unremarkable. the contact dermatitis seems to be improving. Physical therapy also ordered

## 2017-12-28 NOTE — PATIENT INSTRUCTIONS
Patient Information     Patient Name MRN Sex Yohana Harvey [id number] Female 1990      Patient Instructions by Rocky Horner MD at 2017  4:09 PM     Author:  Rocky Horner MD Service:  (none) Author Type:  Physician     Filed:  2017  4:09 PM Encounter Date:  2017 Status:  Signed     :  Rocky Horner MD (Physician)            Index Portuguese     Adult Advisor 2016.3 published by [facility].  Last reviewed: 2016

## 2017-12-30 NOTE — INITIAL ASSESSMENTS
Patient Information     Patient Name MRN Sex Yohana Harvey [id number] Female 1990      Initial Assessments by Jarocho Ospina PT at 2017 11:03 AM     Author:  Jarocho Ospina PT Service:  (none) Author Type:  PT- Physical Therapist     Filed:  2017  4:02 PM Date of Service:  2017 11:03 AM Status:  Signed     :  Jarocho Ospina PT (PT- Physical Therapist)            [facility]  Outpatient PT - Initial Evaluation  Spine Eval    Date of Service: 2017     Visit 1/10    Patient Name: Yohana Robertson   YOB: 1990   Referring MD/Provider: Dr. Horner  Medical and Treatment Diagnosis: (not recorded)  PT Treatment Diagnosis: Low back pain with radicular symptoms  Insurance: Other:  CARE BASIC  Start of Service: 17  Certification Dates: Start of Service: 17  Medicare/MA Re-Cert Due: 10/06/17     Precautions:  None   Cognition:  Oriented to Person, Place, and Time.     Were cultural / age or other special adaptations needed? No      Patient is a vulnerable adult: No      Patient is aware of diagnosis: Yes      Risks and benefits explained: Yes    Subjective      History: Patient is a 26 year old female referred to physical therapy with complaints of back pain that radiate primarily into her left LE. Has been dealing with some sort of back pain ever since she was pregnant 7 years ago. It most recently got worse about 3 months ago when she states she had a day of activity, yard work and house work. At end of day she was putting her kids to bed, she bent down and felt a severe pain when trying to bend back up. Majority of her pain comes in the evening after an active day. Numbness and tingling on the left LE down to her toes. At times she will get shooting pain across her low back. Only sleeping on average 4 hours per night because of pain.     Date of onset: 3 months ago      Rates pain: 3/10  Describes pain: Increased  pressure in low back, primarily on left, numbness and tingling down the left leg.   Locates pain: left low back radiating into left LE.     Current functional limitations: bending over to pick something up, playing with the kids, outdoor activity, ascending stairs    Prior Level:  Minimal to no difficulty completing the above functional activities.     Past Medical History:     Diagnosis  Date     Contraceptive need     started on ALYSSIA      History of adolescent bunions      Past Surgical History:      Procedure  Laterality Date     BUNIONECTOMY  4/2005     Left       COLONOSCOPY SCREENING  7/2013    Normal       FRACTURE TREATMENT  6/13    L Foot-tarsometatarsal realignment arthrodesis with plate and screw fixation, capsule tendon balancing procedures about the first tarsometatarsal joint, Landry Gibson DPM  [facility]       REMOVAL OF FOREIGN BODY  7/26/2006    Removal of harware L foot after bunionectomy [Other][[[[       TONSILLECTOMY  12/2005     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 Was prescribed another  anti-inflammatory and pain meds that she can't remember.   Physical Therapy - Yes, 5 years ago when she was pregnant with her daughter.   Injections - No  Surgery - Not on the back  Pain Clinic - No    Diagnostics:  Reviewed (see chart)   Current Medications:  Reviewed (see chart)    Drug Allergies:  Reviewed (see chart)  ?   Latex Allergy:  No     Objective    Items left blank indicate that the test was inappropriate or not meaningful at the time of evaluation and therefore not performed.    Fall Risk Screening: No risk factors identified    ROM/Strength:     Cerv Thor Lumbar Myotomes    L    R     L    R   Flexion   Mod limited with pain C4 Shoulder Elev.   L2 Hip Flexion 5/5 5/5   Extension   Avoids movement, major limited C5 Shoulder Abd.   L3 Knee Ext.      5/5 5/5   Left Lat.  Flex   WNL  More painful than right  C6 Elbow Flexion   L4 Ankle DF 5/5 5/5   Right Lat. Flex   WNL C7 Elbow Ext.   L5 1st MTP Ext.     Left Rotation   WNL C8 Finger Flex   S1 Ankle P.F.     Right Rotation   WNL T1 Finger Abd.   S2 Knee Flexion 5/5 5/5          Hip Abduction 5/5 5/5          Ext. Rotation       Observation:  Patient appears apprehensive about certain movements of the spine. She tries to avoid any sort of extension because she believes it is going to hurt.     Palpation: Tenderness and tension noted along the lumbar paraspinals. More tension noted on left side. Minimal discomfort with gluteal and piriformis palpation. Discomfort with PA glides during joint assessment in lumbar spine.     Gait:  No major change in gait pattern.     Special Tests:    Length Length: negative  Light Touch: negative  Slump test: positive on left    Today's Intervention:    Evaluation- education about spine and movement   Therapeutic Exercise:   Seated Sciatic nerve glide   Lower trunk rotation   Prone on elbows    Home Exercise Program:    Seated Sciatic nerve glide - 3x10, 2x daily  Lower trunk rotation - 3x daily, 2-3 minutes  Prone on elbows 3x10, 1x daily    Assessment    Therapist Assessment / Clinical Impression:  Signs and symptoms consistent with low back pain with radicular symptoms. Patient appears to be fearful with certain movement patterns due to pain she has had in the past. Can only sit in certain positions for limited time today. Will continue to educate patient on movement and benefits for the low back and begin to progress range of motion in the lumbar spine.     The patient is appropriate for physical therapy to address their pain, functional limitations, and physical impairments.      Functional Impairment(s): See subjective on initial evaluation and Functional Assessment / Summary Report from [facility].    Physical Impairment(s):  Decreased ROM, decreased functional strength and endurance due to pain,  decreased activity tolerance    Patient Specific Functional and Pain Scales (PSFS):    Clinician Instructions: Complete after the history and before the exam.    Initial Assessment: We want to know what 3 activities in your life you are unable to perform, or are having the most difficulty performing, as a result of your chief problem. Please list and score at least 3 activities that you are unable to perform, or having the most difficulty performing, because of your chief problem.   Patient Specific Activity Scoring Scheme (score one number for each activity):   Activity Score (0-10)  0= Unable to perform activity  10= Able to perform activity at same level as before injury or problem   1. Gardening 3/10   2. House cleaning 4/10   3. Riding in car for 30 minutes 5/10   4. Fishing  8/10   5.  /10   Totals:  18/40 = 45 % ability which relates to 55% impairment    Patient verbally states that they understand that the information they have provided above is current and complete to the best of their knowledge.    Patient Specific Functional Scale Modifier Scale Conversion: (patient's modifier that correlates with pt's score on PSFS): 5-CK (50% Impaired).    G codes and Modifier taken from pt completing a PSFS: completed at initial evaluation   Initial Primary G Code and Modifier:    Per the Patient's intake and/or assessment the Primary G Code is: Mobility .   The Patient's Impairment, Limitation or Restriction Modifier would be best described as: CK - 40% - 60% Impairment.     Goal Primary G Code and Modifier:    The Patient's G Code Goal would be: Mobility    The Patient's Impairment, Limitation or Restriction Modifier goal would be best described as: CI - 1% - 20% Impairment.     Goals:  Functional Short Term Goals (4 weeks):   Patient will demonstrate compliance and independence with HEP in order to promote return to prior level of function.   Patient will be able to bend over to the ground to  10  lbs with pain no greater than 4/10 in order to promote return to prior level of function.   Patient will be able to  one position for 10 minutes in order to help with household chores, such as cleaning dishes.    Functional Long Term Goals (8 weeks):   Patient will be able to ambulate outdoors for 20 minutes with no break and pain no greater than 2/10 in order to return to prior level of function.   Patient will be able to squat down to the floor to  20 lbs with pain no greater than 2/10 in order to return to prior level of function.   Patient will be able to sit for longer than 30 minutes with pain no greater than 2/10 in order to drive longer distances in a car safely and efficiently.     Patient participated in goal selection and understand(s) the plan of care: Yes   Patient Potential for Achieving Desired Outcome: Good. Patient is young and motivated to improve     Response to Intervention: Patient was able to tolerate therapeutic exercise without an increase in pain.     Plan    Treatment Plan:      Frequency:   16 visits    Duration of Treatment: 8 weeks    Planned Interventions:    Home Exercise Program development  Therapeutic Exercise (ROM & Strengthening)  Therapeutic Activities  Neuromuscular Re-education  Manual Therapy  Ultrasound  E-Stim  Gait Training  Hot Packs / Cold Pack Modalities  Vasopneumatic Compression with Cold Therapy ( Game Ready )  Phonophoresis with Ketoprofen  Iontophoresis with Dexamethasone  Mechanical Traction    Plan for next visit:  Progress patient's movement in the lumbar spine. Manual therapy to low back to decrease tension and pain.    Thank you for your referral to [facility].  Please call with any questions, concerns or comments.  [phone]    The signature, of the referring medical provider, on this document indicates certification of the above prescribed plan of care and is medically necessary.      Jarocho Ospina, PT  ....................  8/11/2017   3:52 PM

## 2018-01-27 VITALS
HEART RATE: 52 BPM | WEIGHT: 159.8 LBS | SYSTOLIC BLOOD PRESSURE: 112 MMHG | BODY MASS INDEX: 28.08 KG/M2 | DIASTOLIC BLOOD PRESSURE: 64 MMHG

## 2018-01-31 ASSESSMENT — PATIENT HEALTH QUESTIONNAIRE - PHQ9: SUM OF ALL RESPONSES TO PHQ QUESTIONS 1-9: 0

## 2018-02-11 ENCOUNTER — DOCUMENTATION ONLY (OUTPATIENT)
Dept: FAMILY MEDICINE | Facility: OTHER | Age: 28
End: 2018-02-11

## 2018-02-11 PROBLEM — M54.40 ACUTE BILATERAL LOW BACK PAIN WITH SCIATICA: Status: ACTIVE | Noted: 2017-07-27

## 2018-02-11 PROBLEM — J45.909 ASTHMA: Status: ACTIVE | Noted: 2018-02-11

## 2018-02-11 PROBLEM — Z72.0 TOBACCO ABUSE: Status: ACTIVE | Noted: 2018-02-11

## 2018-02-11 PROBLEM — F98.8 ATTENTION DEFICIT DISORDER: Status: ACTIVE | Noted: 2018-02-11

## 2018-02-11 RX ORDER — HYDROCODONE BITARTRATE AND ACETAMINOPHEN 5; 325 MG/1; MG/1
1-2 TABLET ORAL EVERY 6 HOURS PRN
COMMUNITY
Start: 2017-07-21 | End: 2018-04-30

## 2018-02-11 RX ORDER — CYCLOBENZAPRINE HCL 10 MG
10 TABLET ORAL 3 TIMES DAILY
COMMUNITY
Start: 2017-07-21 | End: 2018-04-30

## 2018-02-11 RX ORDER — IBUPROFEN 600 MG/1
600 TABLET, FILM COATED ORAL 4 TIMES DAILY PRN
Status: ON HOLD | COMMUNITY
Start: 2017-07-21 | End: 2020-08-06

## 2018-02-11 RX ORDER — BENZOCAINE/MENTHOL 6 MG-10 MG
LOZENGE MUCOUS MEMBRANE 2 TIMES DAILY
COMMUNITY
Start: 2017-07-21 | End: 2018-04-30

## 2018-02-11 RX ORDER — PREDNISONE 10 MG/1
TABLET ORAL
COMMUNITY
Start: 2017-07-21 | End: 2018-04-30

## 2018-02-11 RX ORDER — ETODOLAC 500 MG
500 TABLET ORAL 2 TIMES DAILY WITH MEALS
COMMUNITY
Start: 2017-07-27 | End: 2018-04-30

## 2018-02-12 NOTE — DISCHARGE SUMMARY
Patient Information     Patient Name MRN Sex     Yohana Robertson [id number] Female 1990      Discharge Summaries by Jarocho Ospina PT at 2017  1:34 PM     Author:  Jarocho Ospina PT Service:  (none) Author Type:  PT- Physical Therapist     Filed:  2017  1:39 PM Date of Service:  2017  1:34 PM Status:  Signed     :  Jarocho Ospina PT (PT- Physical Therapist)            [facility]  Outpatient PT - Discharge Summary  Patient Name: Yohana Robertson   YOB: 1990   Referring MD/Provider: Dr. Horner  Medical and Treatment Diagnosis: (not recorded)  PT Treatment Diagnosis: Low back pain with radicular symptoms  Insurance: Other: IM CARE BASIC  Start of Service: 17  Certification Dates: Start of Service: 17                   Date of discharge: 2017     Dates of Service: 2017 to 10/05/2017  Number of visits: 8           Reason for Discharge:  Goals of therapy met   Other: Patient improved well enough and close to her baseline level that she was able to manage her symptoms independently. No further appointments were scheduled    Progress from Initial to Discharge (if applicable):  Patient improved significantly since the start of therapy. She had low back pain that initially limited her mobility and strength. Had difficulty lifting and doing activities with her children. By the end of treatment, all of this had improved. Patient reported she was very close to her baseline level    Comments:  See progress note on 10/05/2017 for status at the time of final visit.     Discharge G-codes and PSFS were unable to be reassessed as discharge was unplanned.  All goals were met.     Further Recommendations:    Patient will continue with established home exercise program     Patient is discharged from Physical Therapy    Thank you for your referral to [facility].  Please call with any questions, concerns or  comments.  [phone]

## 2018-03-25 ENCOUNTER — HEALTH MAINTENANCE LETTER (OUTPATIENT)
Age: 28
End: 2018-03-25

## 2018-04-30 ENCOUNTER — OFFICE VISIT (OUTPATIENT)
Dept: FAMILY MEDICINE | Facility: OTHER | Age: 28
End: 2018-04-30
Attending: FAMILY MEDICINE
Payer: COMMERCIAL

## 2018-04-30 VITALS
DIASTOLIC BLOOD PRESSURE: 82 MMHG | SYSTOLIC BLOOD PRESSURE: 124 MMHG | HEART RATE: 76 BPM | WEIGHT: 159.6 LBS | BODY MASS INDEX: 28.05 KG/M2 | TEMPERATURE: 98.6 F

## 2018-04-30 DIAGNOSIS — Z72.0 TOBACCO ABUSE: ICD-10-CM

## 2018-04-30 DIAGNOSIS — N92.0 MENORRHAGIA WITH REGULAR CYCLE: Primary | ICD-10-CM

## 2018-04-30 PROCEDURE — G0463 HOSPITAL OUTPT CLINIC VISIT: HCPCS

## 2018-04-30 PROCEDURE — G0463 HOSPITAL OUTPT CLINIC VISIT: HCPCS | Mod: 25

## 2018-04-30 PROCEDURE — 99406 BEHAV CHNG SMOKING 3-10 MIN: CPT | Performed by: FAMILY MEDICINE

## 2018-04-30 PROCEDURE — 99213 OFFICE O/P EST LOW 20 MIN: CPT | Mod: 25 | Performed by: FAMILY MEDICINE

## 2018-04-30 ASSESSMENT — ANXIETY QUESTIONNAIRES
3. WORRYING TOO MUCH ABOUT DIFFERENT THINGS: SEVERAL DAYS
6. BECOMING EASILY ANNOYED OR IRRITABLE: MORE THAN HALF THE DAYS
2. NOT BEING ABLE TO STOP OR CONTROL WORRYING: SEVERAL DAYS
7. FEELING AFRAID AS IF SOMETHING AWFUL MIGHT HAPPEN: NOT AT ALL
5. BEING SO RESTLESS THAT IT IS HARD TO SIT STILL: MORE THAN HALF THE DAYS
GAD7 TOTAL SCORE: 9
1. FEELING NERVOUS, ANXIOUS, OR ON EDGE: SEVERAL DAYS
IF YOU CHECKED OFF ANY PROBLEMS ON THIS QUESTIONNAIRE, HOW DIFFICULT HAVE THESE PROBLEMS MADE IT FOR YOU TO DO YOUR WORK, TAKE CARE OF THINGS AT HOME, OR GET ALONG WITH OTHER PEOPLE: SOMEWHAT DIFFICULT

## 2018-04-30 ASSESSMENT — PAIN SCALES - GENERAL: PAINLEVEL: MILD PAIN (3)

## 2018-04-30 ASSESSMENT — PATIENT HEALTH QUESTIONNAIRE - PHQ9: 5. POOR APPETITE OR OVEREATING: MORE THAN HALF THE DAYS

## 2018-04-30 NOTE — PROGRESS NOTES
Nursing Notes:   Jessica Guerrero, LPN  4/30/2018  2:18 PM  Signed  She has had bright red bleeding through her whole period, a lot of bleeding, passing big clots and alot of cramping for the past 2 months. She states she has constantly had abdominal pain. She cried form the pain after intercourse. She has been getting migraines 3 days before her period and night sweats.  Jessica Guerrero LPN..................4/30/2018   2:08 PM      SUBJECTIVE:  Yohana Robertson  is a 27 year old female who comes in today because of menstrual issues.  She has had bright red bleeding through her whole menses, passing clots and had a lot of cramping for the past couple months.  She has nearly constant abdominal pain.  She has been having dyspareunia to the point of tears. She had pain with orgasm.  She is also been getting migraines prior to her periods.    Menses are regular. This has been going on for a couple of years and getting worse.  She is not using birth control. She tried it in the past and it helped but she felt sicker.  She was on the pill in January 2015. She had tried DepoProvera. She is done having babies.     She is still smoking occasionally but has been vaping.     She had an injury to her left flank when her daughter landed on her about a month ago.  She had a big bruise there and now it still somewhat uncomfortable but much less so than it was before    Past Medical, Family, and Social History reviewed and updated as noted below.   ROS is negative except as noted above       Allergies   Allergen Reactions     Codeine Hives and Shortness Of Breath     Peanuts  [Nuts] Shortness Of Breath   ,   Family History   Problem Relation Age of Onset     HEART DISEASE Mother      Heart Disease,Aortic stenosis and valve replaced     Family History Negative Father      Good Health     Asthma Maternal Grandmother      Asthma     HEART DISEASE Maternal Grandfather      Heart Disease   ,   Current Outpatient Prescriptions    Medication     ibuprofen (ADVIL/MOTRIN) 600 MG tablet     No current facility-administered medications for this visit.    ,   Past Medical History:   Diagnosis Date     Bunion of great toe of left foot     No Comments Provided     Contraceptive management     started on ALYSSIA   ,   Patient Active Problem List    Diagnosis Date Noted     Asthma 02/11/2018     Priority: Medium     Attention deficit disorder 02/11/2018     Priority: Medium     Tobacco abuse 02/11/2018     Priority: Medium     Overview:   Trying to quit       Acute bilateral low back pain with sciatica 07/27/2017     Priority: Medium     Anxiety 01/09/2014     Priority: Medium     Severe episode of recurrent major depressive disorder (H) 12/26/2013     Priority: Medium     Nonallopathic lesion of lumbar region 08/08/2013     Priority: Medium     IBS (irritable bowel syndrome) 03/28/2013     Priority: Medium     Bunion, left foot 04/11/2012     Priority: Medium     Common migraine 04/20/2011     Priority: Medium   ,   Past Surgical History:   Procedure Laterality Date     BUNIONECTOMY      4/2005,Left     COLONOSCOPY      7/2013,Normal     FRACTURE SURGERY      6/13,L Foot-tarsometatarsal realignment arthrodesis with plate and screw fixation, capsule tendon balancing procedures about the first tarsometatarsal joint, [facility]     OTHER SURGICAL HISTORY      7/26/2006,205448,REMOVAL OF FOREIGN BODY,Removal of harware L foot after bunionectomy [Other][[[[     TONSILLECTOMY      12/2005    and   Social History   Substance Use Topics     Smoking status: Current Every Day Smoker     Packs/day: 0.50     Years: 9.00     Types: Cigarettes     Smokeless tobacco: Never Used      Comment: using e-cig     Alcohol use 12.6 oz/week     OBJECTIVE:  /82 (BP Location: Right arm, Patient Position: Chair, Cuff Size: Adult Regular)  Pulse 76  Temp 98.6  F (37  C) (Tympanic)  Wt 159 lb 9.6 oz (72.4 kg)  LMP 04/18/2018   Breastfeeding? No  BMI 28.05 kg/m2   EXAM:  Alert cooperative, no distress.  Abdomen is soft and minimally tender to deep palpation.  She has some soreness on her left flank with some fullness that is consistent with a previous hematoma that is likely organizing.  Lungs are clear, no rales rhonchi or wheezes are heard.  She has no CVA tenderness.  ASSESSMENT/Plan :    Yohana was seen today for abnormal bleeding problem.    Diagnoses and all orders for this visit:    Menorrhagia with regular cycle  -     US Pelvic Complete with Transvaginal; Future  -     OB/GYN REFERRAL    Tobacco abuse  -     SMOKING CESSATION COUNSELING 3-10 MIN        Discussed options with her about her menorrhagia with clots and cramping.  Concerned that she may have something else going on because of her orgasmic pain and dyspareunia.  May have endometriosis or some other cause for this.  She is interested in gynecologic referral to discuss options.  Will proceed with ultrasound of the pelvis and GYN consultation.    Greater than 3 minutes was spent in consultation and educationregarding tobacco cessation due to associated long-term complications of continued tobacco use.   Oscar Robertson MD

## 2018-04-30 NOTE — NURSING NOTE
She has had bright red bleeding through her whole period, a lot of bleeding, passing big clots and alot of cramping for the past 2 months. She states she has constantly had abdominal pain. She cried form the pain after intercourse. She has been getting migraines 3 days before her period and night sweats.  Jessica Guerrero LPN..................4/30/2018   2:08 PM

## 2018-04-30 NOTE — MR AVS SNAPSHOT
After Visit Summary   4/30/2018    Yohana Robertson    MRN: [id number]           Patient Information     Date Of Birth          1990        Visit Information        Provider Department      4/30/2018 1:45 PM Oscar Robertson MD [facility]        Today's Diagnoses     Menorrhagia with regular cycle    -  1    Tobacco abuse           Follow-ups after your visit        Additional Services     OB/GYN REFERRAL       Your provider has referred you to:  [facility]: [facility] [place] [phone]   [email or web address]    Please be aware that coverage of these services is subject to the terms and limitations of your health insurance plan.  Call member services at your health plan with any benefit or coverage questions.      Please bring the following with you to your appointment:    (1) Any X-Rays, CTs or MRIs which have been performed.  Contact the facility where they were done to arrange for  prior to your scheduled appointment.   (2) List of current medications   (3) This referral request   (4) Any documents/labs given to you for this referral                  Your next 10 appointments already scheduled     May 11, 2018  9:15 AM CDT   US PELVIC COMPLETE W TRANSVAGINAL with GERALDO   [facility] ([facility])    [address]  [facility] [address] [phone]           Please bring a list of your medicines (including vitamins, minerals and over-the-counter drugs). Also, tell your doctor about any allergies you may have. Wear comfortable clothes and leave your valuables at home.  Adults: Drink six 8-ounce glasses of fluid one hour before your exam. Do NOT empty your bladder.  If you need to empty your bladder before your exam, try to release only a little bit of urine. Then, drink another 8oz glass of fluid.  Children: Children who are potty trained should drink at least 4 cups (73  "oz) of liquid 45 minutes to one hour prior to the exam. The child s bladder must be full in order to achieve a diagnostic exam. If your child is very uncomfortable or has an urgent need to pee, please notify a technologist; they will try to find out how much longer the wait may be and provide instructions to help relieve the pressure. Occasionally it is medically necessary to insert a urinary catheter to fill the bladder.  Please call the Imaging Department at your exam site with any questions.            May 21, 2018  9:15 AM CDT   CONSULT with Curtis Stephens MD   [facility]    [address]  [place] [address] [phone]              Future tests that were ordered for you today     Open Future Orders        Priority Expected Expires Ordered    US Pelvic Complete with Transvaginal Routine  4/30/2019 4/30/2018            Who to contact     If you have questions or need follow up information about today's clinic visit or your schedule please contact [facility] directly at [phone].  Normal or non-critical lab and imaging results will be communicated to you by [employer] Quoteshart, letter or phone within 4 business days after the clinic has received the results. If you do not hear from us within 7 days, please contact the clinic through Emcoret or phone. If you have a critical or abnormal lab result, we will notify you by phone as soon as possible.  Submit refill requests through FiREapps or call your pharmacy and they will forward the refill request to us. Please allow 3 business days for your refill to be completed.          Additional Information About Your Visit        [employer] QuotesharOberScharrer Information     FiREapps lets you send messages to your doctor, view your test results, renew your prescriptions, schedule appointments and more. To sign up, go to [email or web address] . Click on \"Log in\" on the left side of the screen, which will take " "you to the Welcome page. Then click on \"Sign up Now\" on the right side of the page.     You will be asked to enter the access code listed below, as well as some personal information. Please follow the directions to create your username and password.     Your access code is: [email or web address]  Expires: 2018  2:49 PM     Your access code will  in 90 days. If you need help or a new code, please call your [place] clinic or [phone].        Care EveryWhere ID     This is your Care EveryWhere ID. This could be used by other organizations to access your [place] medical records  [id number]        Your Vitals Were     Pulse Temperature Last Period Breastfeeding? BMI (Body Mass Index)       76 98.6  F (37  C) (Tympanic) 2018 No 28.05 kg/m2        Blood Pressure from Last 3 Encounters:   18 124/82   17 112/64   16 100/84    Weight from Last 3 Encounters:   18 159 lb 9.6 oz (72.4 kg)   17 159 lb 12.8 oz (72.5 kg)   16 156 lb 3.2 oz (70.9 kg)              We Performed the Following     OB/GYN REFERRAL     SMOKING CESSATION COUNSELING 3-10 MIN        Primary Care Provider Office Phone # Fax #    Oscar Robertson [phone] [address] [facility] [address]        Equal Access to Services     [facility]: Hadii belgica Perez, waaxda isidoro, qaybta kaalmona conrad, milly mata. So [facility] [phone].    ATENCIÓN: Si habla español, tiene a magallanes disposición servicios gratuitos de asistencia lingüística. Llame al [phone].    We comply with applicable federal civil rights laws and [place] laws. We do not discriminate on the basis of race, color, national origin, age, disability, sex, sexual orientation, or gender identity.            Thank you!     Thank you for choosing [facility]  for your care. Our goal is always to provide you with excellent care. Hearing back from our " patients is one way we can continue to improve our services. Please take a few minutes to complete the written survey that you may receive in the mail after your visit with us. Thank you!             Your Updated Medication List - Protect others around you: Learn how to safely use, store and throw away your medicines at www.disposemymeds.org.          This list is accurate as of 4/30/18  2:49 PM.  Always use your most recent med list.                   Brand Name Dispense Instructions for use Diagnosis    ibuprofen 600 MG tablet    ADVIL/MOTRIN     Take 600 mg by mouth 4 times daily as needed for pain Max dose 3200 mg in 24 hrs

## 2018-05-01 ASSESSMENT — ASTHMA QUESTIONNAIRES: ACT_TOTALSCORE: 25

## 2018-05-01 ASSESSMENT — PATIENT HEALTH QUESTIONNAIRE - PHQ9: SUM OF ALL RESPONSES TO PHQ QUESTIONS 1-9: 11

## 2018-05-01 ASSESSMENT — ANXIETY QUESTIONNAIRES: GAD7 TOTAL SCORE: 9

## 2018-05-11 ENCOUNTER — HOSPITAL ENCOUNTER (OUTPATIENT)
Dept: ULTRASOUND IMAGING | Facility: OTHER | Age: 28
Discharge: HOME | End: 2018-05-11
Attending: FAMILY MEDICINE | Admitting: FAMILY MEDICINE
Payer: COMMERCIAL

## 2018-05-11 DIAGNOSIS — N92.0 MENORRHAGIA WITH REGULAR CYCLE: ICD-10-CM

## 2018-05-11 PROCEDURE — 76856 US EXAM PELVIC COMPLETE: CPT

## 2018-05-14 ENCOUNTER — MYC MEDICAL ADVICE (OUTPATIENT)
Dept: OBGYN | Facility: OTHER | Age: 28
End: 2018-05-14

## 2018-05-30 ENCOUNTER — OFFICE VISIT (OUTPATIENT)
Dept: OBGYN | Facility: OTHER | Age: 28
End: 2018-05-30
Attending: FAMILY MEDICINE
Payer: COMMERCIAL

## 2018-05-30 VITALS
SYSTOLIC BLOOD PRESSURE: 118 MMHG | DIASTOLIC BLOOD PRESSURE: 74 MMHG | HEIGHT: 64 IN | BODY MASS INDEX: 26.54 KG/M2 | HEART RATE: 60 BPM | WEIGHT: 155.44 LBS

## 2018-05-30 DIAGNOSIS — Z12.4 SCREENING FOR MALIGNANT NEOPLASM OF CERVIX: ICD-10-CM

## 2018-05-30 DIAGNOSIS — N92.0 EXCESSIVE OR FREQUENT MENSTRUATION: Primary | ICD-10-CM

## 2018-05-30 LAB
T4 FREE SERPL-MCNC: 0.82 NG/DL (ref 0.6–1.6)
TSH SERPL DL<=0.05 MIU/L-ACNC: 3.18 IU/ML (ref 0.34–5.6)

## 2018-05-30 PROCEDURE — 84443 ASSAY THYROID STIM HORMONE: CPT | Performed by: OBSTETRICS & GYNECOLOGY

## 2018-05-30 PROCEDURE — G0463 HOSPITAL OUTPT CLINIC VISIT: HCPCS

## 2018-05-30 PROCEDURE — G0123 SCREEN CERV/VAG THIN LAYER: HCPCS | Performed by: OBSTETRICS & GYNECOLOGY

## 2018-05-30 PROCEDURE — 88142 CYTOPATH C/V THIN LAYER: CPT | Mod: ZL | Performed by: OBSTETRICS & GYNECOLOGY

## 2018-05-30 PROCEDURE — G0463 HOSPITAL OUTPT CLINIC VISIT: HCPCS | Mod: 25

## 2018-05-30 PROCEDURE — 84439 ASSAY OF FREE THYROXINE: CPT | Performed by: OBSTETRICS & GYNECOLOGY

## 2018-05-30 PROCEDURE — 36415 COLL VENOUS BLD VENIPUNCTURE: CPT | Performed by: OBSTETRICS & GYNECOLOGY

## 2018-05-30 PROCEDURE — 99204 OFFICE O/P NEW MOD 45 MIN: CPT | Mod: 25 | Performed by: OBSTETRICS & GYNECOLOGY

## 2018-05-30 PROCEDURE — 40001026 ZZHCL STATISTICAL PAP TEST QC: Performed by: OBSTETRICS & GYNECOLOGY

## 2018-05-30 ASSESSMENT — PAIN SCALES - GENERAL: PAINLEVEL: NO PAIN (1)

## 2018-05-30 ASSESSMENT — ANXIETY QUESTIONNAIRES
1. FEELING NERVOUS, ANXIOUS, OR ON EDGE: MORE THAN HALF THE DAYS
5. BEING SO RESTLESS THAT IT IS HARD TO SIT STILL: NEARLY EVERY DAY
6. BECOMING EASILY ANNOYED OR IRRITABLE: NEARLY EVERY DAY
3. WORRYING TOO MUCH ABOUT DIFFERENT THINGS: MORE THAN HALF THE DAYS
GAD7 TOTAL SCORE: 18
IF YOU CHECKED OFF ANY PROBLEMS ON THIS QUESTIONNAIRE, HOW DIFFICULT HAVE THESE PROBLEMS MADE IT FOR YOU TO DO YOUR WORK, TAKE CARE OF THINGS AT HOME, OR GET ALONG WITH OTHER PEOPLE: VERY DIFFICULT
7. FEELING AFRAID AS IF SOMETHING AWFUL MIGHT HAPPEN: MORE THAN HALF THE DAYS
2. NOT BEING ABLE TO STOP OR CONTROL WORRYING: NEARLY EVERY DAY

## 2018-05-30 ASSESSMENT — PATIENT HEALTH QUESTIONNAIRE - PHQ9: 5. POOR APPETITE OR OVEREATING: NEARLY EVERY DAY

## 2018-05-30 NOTE — MR AVS SNAPSHOT
After Visit Summary   5/30/2018    Yohana Robertson    MRN: [id number]           Patient Information     Date Of Birth          1990        Visit Information        Provider Department      5/30/2018 8:45 AM Renate Ch MD [facility]        Today's Diagnoses     Excessive or frequent menstruation    -  1       Follow-ups after your visit        Your next 10 appointments already scheduled     Jun 13, 2018  9:00 AM CDT   SHORT with Renate Ch MD   [facility]    [address]  [place] [id number] [phone]              Who to contact     If you have questions or need follow up information about today's clinic visit or your schedule please contact [facility] directly at [phone].  Normal or non-critical lab and imaging results will be communicated to you by [email or web address], letter or phone within 4 business days after the clinic has received the results. If you do not hear from us within 7 days, please contact the clinic through [email or web address] or phone. If you have a critical or abnormal lab result, we will notify you by phone as soon as possible.  Submit refill requests through Inofile or call your pharmacy and they will forward the refill request to us. Please allow 3 business days for your refill to be completed.          Additional Information About Your Visit        MyChart Information     Inofile gives you secure access to your electronic health record. If you see a primary care provider, you can also send messages to your care team and make appointments. If you have questions, please call your primary care clinic.  If you do not have a primary care provider, please call [phone] and they will assist you.        Care EveryWhere ID     This is your Care EveryWhere ID. This could be used by other organizations to access your [place] medical records  [id number]       "  Your Vitals Were     Pulse Height Last Period BMI (Body Mass Index)          60 1.626 m (5' 4\") 05/06/2018 (Approximate) 26.68 kg/m2         Blood Pressure from Last 3 Encounters:   05/30/18 118/74   04/30/18 124/82   07/27/17 112/64    Weight from Last 3 Encounters:   05/30/18 70.5 kg (155 lb 7 oz)   04/30/18 72.4 kg (159 lb 9.6 oz)   07/27/17 72.5 kg (159 lb 12.8 oz)              We Performed the Following     T4, free     TSH        Primary Care Provider Office Phone # Fax #    Oscar Robertson [phone] [address] [facility] [address]        Equal Access to Services     DERRICK DÍAZ : Sabine Perez, waaxda luqadaha, qaybta kaalmada anamaria, milly hull . So [facility] [phone].    ATENCIÓN: Si habla español, tiene a magallanes disposición servicios gratuitos de asistencia lingüística. Llame al [phone].    We comply with applicable federal civil rights laws and [place] laws. We do not discriminate on the basis of race, color, national origin, age, disability, sex, sexual orientation, or gender identity.            Thank you!     Thank you for choosing [facility]  for your care. Our goal is always to provide you with excellent care. Hearing back from our patients is one way we can continue to improve our services. Please take a few minutes to complete the written survey that you may receive in the mail after your visit with us. Thank you!             Your Updated Medication List - Protect others around you: Learn how to safely use, store and throw away your medicines at www.disposemymeds.org.          This list is accurate as of 5/30/18  9:57 AM.  Always use your most recent med list.                   Brand Name Dispense Instructions for use Diagnosis    ibuprofen 600 MG tablet    ADVIL/MOTRIN     Take 600 mg by mouth 4 times daily as needed for pain Max dose 3200 mg in 24 hrs          "

## 2018-05-30 NOTE — NURSING NOTE
Patient presents today as a consult regarding menorrhagia with regular cycle. She is also due for a pap.  Lela Conteh LPN  5/30/2018  8:57 AM

## 2018-05-30 NOTE — LETTER
2018       RE: Yohana Robertson  [address] [place] [address]     Dear Colleague,    Thank you for referring your patient, Yohana Robertson, to the [facility]. Please see a copy of my visit note below.    Gynecology Visit    CC: heavy menses    HPI:    Yohana Robertson is a 27 year old  here for heavy menses. She reports a long history of heavy menses that are getting progressively worse. She starts having severe cramping, nausea, and migraines a few days before her periods. This continues through the duration of her period. She gets menses every 3-4 weeks. They last a total of 5-7 days, with the first 2-3 days being heavy. She changes an overnight pad almost every hour and will pass large clots. Prior to having children, she had tried OCPs for about a year and Depo for a few years, however, both caused nausea. She has not been using anything for contraception in the last 6 years, instead relying on natural family planning. She also has deep dyspareunia and pain with orgasm. She cannot wear tampons because of deep pain. She has two children and does not desire any more.       OBHx  Obstetric History       T2      L2     SAB2   TAB0   Ectopic0   Multiple0   Live Births2       # Outcome Date GA Lbr Gopal/2nd Weight Sex Delivery Anes PTL Lv   4 SAB            3 SAB            2 Term    2.722 kg (6 lb) F    AMY   1 Term    4.706 kg (10 lb 6 oz) M    AMY          PMHx:   Patient Active Problem List   Diagnosis     Acute bilateral low back pain with sciatica     Anxiety     Asthma     Attention deficit disorder     Bunion, left foot     Common migraine     IBS (irritable bowel syndrome)     Severe episode of recurrent major depressive disorder (H)     Nonallopathic lesion of lumbar region     Tobacco abuse      PSHx:   Past Surgical History:   Procedure Laterality Date     BUNIONECTOMY      2005,Left      "COLONOSCOPY      7/2013,Normal     FRACTURE SURGERY      6/13,L Foot-tarsometatarsal realignment arthrodesis with plate and screw fixation, capsule tendon balancing procedures about the first tarsometatarsal joint, [facility]     OTHER SURGICAL HISTORY      7/26/2006,848151,REMOVAL OF FOREIGN BODY,Removal of harware L foot after bunionectomy [Other][[[[     TONSILLECTOMY      12/2005      Meds:   Current Outpatient Prescriptions on File Prior to Visit:  ibuprofen (ADVIL/MOTRIN) 600 MG tablet Take 600 mg by mouth 4 times daily as needed for pain Max dose 3200 mg in 24 hrs     No current facility-administered medications on file prior to visit.     Allergies:       Allergies   Allergen Reactions     Codeine Hives and Shortness Of Breath     Peanuts  [Nuts] Shortness Of Breath     SocHx:   Social History   Substance Use Topics     Smoking status: Current Every Day Smoker     Packs/day: 0.50     Years: 9.00     Types: Cigarettes     Smokeless tobacco: Never Used      Comment: using e-cig     Alcohol use 12.6 oz/week      Comment: couple of brandys a night   Smokes e-cigarette, about 1 cartridge per day. Smokes cigarettes, about one pack per month. Has a few alcoholic drinks per night. Lives with her  and their two kids. Is a stay at home mom.    FamHx:   Family History   Problem Relation Age of Onset     HEART DISEASE Mother      Heart Disease,Aortic stenosis and valve replaced     Family History Negative Father      Good Health     Asthma Maternal Grandmother      Asthma     HEART DISEASE Maternal Grandfather      Heart Disease        Physical Exam  /74 (BP Location: Right arm, Patient Position: Sitting, Cuff Size: Adult Large)  Pulse 60  Ht 1.626 m (5' 4\")  Wt 70.5 kg (155 lb 7 oz)  LMP 05/06/2018 (Approximate)  BMI 26.68 kg/m2  Gen: Well-appearing, NAD  Resp: nonlabored  Psych: appropriate mood and affect    Pelvic:  Normal appearing external female genitalia. Normal " hair distribution. Vagina is without lesions. Scant discharge. Cervix normal, no lesions, mild tenderness with touching cervix with cytobrush. Uterus is small, mobile, cervix +tender. No adnexal tenderness or masses    Pap collected    Pelvic US 5/11/18:  FINDINGS:   Uterus: Unremarkable .  Endometrium: 7 mm.  Right ovary: Unremarkable  Right ovary size: 2.5 x 2.5 x 1.4 cm  Left ovary: Suboptimally visualized  No free fluid is seen.      IMPRESSION: Left ovary not well seen. Otherwise negative pelvic ultrasound.    Assessment/Plan  Yohana Robertson is a 27 year old  female here for menstrual discussion.  Reviewed normal pelvic ultrasound. Recommend thyroid screening for additional evaluation of heavy menses. If normal, discussed management options for her menses, including OCPs, Depo, Mirena, endometrial ablation, and hysterectomy. Based on her pain symptoms, explained the possibility of endometriosis, but this can only be diagnosed with surgery and biopsy. If endometriosis is present, it is best suppressed with Depo or OCPs instead of Mirena. Discussed risks and benefits of endometrial ablation and hysterectomy, including anticipated recovery times and expected effects on her menses. Due to concern for possible endometriosis, would recommend a laparoscopic approach to hysterectomy. If endometriosis is identified, reviewed the risks and benefits of ovarian preservation vs removal, including need for postoperative HRT with ovarian removal and possible need for additional surgery with ovarian conservation. At this time, she is undecided between the Mirena IUD and hysterectomy. She would like to discuss her options and return to clinic to make a plan. Advised no unprotected intercourse within 2 weeks of her f/u appt if she desires IUD placement.    45 minutes were spent with the patient with >50% spent counseling on menstrual management.      Renate Ch MD  OB/GYN  5/30/2018 8:57 AM

## 2018-05-30 NOTE — PROGRESS NOTES
Gynecology Visit    CC: heavy menses    HPI:    Yohana Robertson is a 27 year old  here for heavy menses. She reports a long history of heavy menses that are getting progressively worse. She starts having severe cramping, nausea, and migraines a few days before her periods. This continues through the duration of her period. She gets menses every 3-4 weeks. They last a total of 5-7 days, with the first 2-3 days being heavy. She changes an overnight pad almost every hour and will pass large clots. Prior to having children, she had tried OCPs for about a year and Depo for a few years, however, both caused nausea. She has not been using anything for contraception in the last 6 years, instead relying on natural family planning. She also has deep dyspareunia and pain with orgasm. She cannot wear tampons because of deep pain. She has two children and does not desire any more.       OBHx  Obstetric History       T2      L2     SAB2   TAB0   Ectopic0   Multiple0   Live Births2       # Outcome Date GA Lbr Gopal/2nd Weight Sex Delivery Anes PTL Lv   4 SAB            3 SAB            2 Term    2.722 kg (6 lb) F    AMY   1 Term    4.706 kg (10 lb 6 oz) M    AMY          PMHx:   Patient Active Problem List   Diagnosis     Acute bilateral low back pain with sciatica     Anxiety     Asthma     Attention deficit disorder     Bunion, left foot     Common migraine     IBS (irritable bowel syndrome)     Severe episode of recurrent major depressive disorder (H)     Nonallopathic lesion of lumbar region     Tobacco abuse      PSHx:   Past Surgical History:   Procedure Laterality Date     BUNIONECTOMY      2005,Left     COLONOSCOPY      2013,Normal     FRACTURE SURGERY      ,L Foot-tarsometatarsal realignment arthrodesis with plate and screw fixation, capsule tendon balancing procedures about the first tarsometatarsal joint, [facility] D[facility]     OTHER SURGICAL HISTORY       "7/26/2006,360335,REMOVAL OF FOREIGN BODY,Removal of harware L foot after bunionectomy [Other][[[[     TONSILLECTOMY      12/2005      Meds:   Current Outpatient Prescriptions on File Prior to Visit:  ibuprofen (ADVIL/MOTRIN) 600 MG tablet Take 600 mg by mouth 4 times daily as needed for pain Max dose 3200 mg in 24 hrs     No current facility-administered medications on file prior to visit.     Allergies:       Allergies   Allergen Reactions     Codeine Hives and Shortness Of Breath     Peanuts  [Nuts] Shortness Of Breath     SocHx:   Social History   Substance Use Topics     Smoking status: Current Every Day Smoker     Packs/day: 0.50     Years: 9.00     Types: Cigarettes     Smokeless tobacco: Never Used      Comment: using e-cig     Alcohol use 12.6 oz/week      Comment: couple of brandys a night   Smokes e-cigarette, about 1 cartridge per day. Smokes cigarettes, about one pack per month. Has a few alcoholic drinks per night. Lives with her  and their two kids. Is a stay at home mom.    FamHx:   Family History   Problem Relation Age of Onset     HEART DISEASE Mother      Heart Disease,Aortic stenosis and valve replaced     Family History Negative Father      Good Health     Asthma Maternal Grandmother      Asthma     HEART DISEASE Maternal Grandfather      Heart Disease        Physical Exam  /74 (BP Location: Right arm, Patient Position: Sitting, Cuff Size: Adult Large)  Pulse 60  Ht 1.626 m (5' 4\")  Wt 70.5 kg (155 lb 7 oz)  LMP 05/06/2018 (Approximate)  BMI 26.68 kg/m2  Gen: Well-appearing, NAD  Resp: nonlabored  Psych: appropriate mood and affect    Pelvic:  Normal appearing external female genitalia. Normal hair distribution. Vagina is without lesions. Scant discharge. Cervix normal, no lesions, mild tenderness with touching cervix with cytobrush. Uterus is small, mobile, cervix +tender. No adnexal tenderness or masses    Pap collected    Pelvic US 5/11/18:  FINDINGS:   Uterus: Unremarkable " .  Endometrium: 7 mm.  Right ovary: Unremarkable  Right ovary size: 2.5 x 2.5 x 1.4 cm  Left ovary: Suboptimally visualized  No free fluid is seen.      IMPRESSION: Left ovary not well seen. Otherwise negative pelvic ultrasound.    Assessment/Plan  Yohana Robertson is a 27 year old  female here for menstrual discussion.  Reviewed normal pelvic ultrasound. Recommend thyroid screening for additional evaluation of heavy menses. If normal, discussed management options for her menses, including OCPs, Depo, Mirena, endometrial ablation, and hysterectomy. Based on her pain symptoms, explained the possibility of endometriosis, but this can only be diagnosed with surgery and biopsy. If endometriosis is present, it is best suppressed with Depo or OCPs instead of Mirena. Discussed risks and benefits of endometrial ablation and hysterectomy, including anticipated recovery times and expected effects on her menses. Due to concern for possible endometriosis, would recommend a laparoscopic approach to hysterectomy. If endometriosis is identified, reviewed the risks and benefits of ovarian preservation vs removal, including need for postoperative HRT with ovarian removal and possible need for additional surgery with ovarian conservation. At this time, she is undecided between the Mirena IUD and hysterectomy. She would like to discuss her options and return to clinic to make a plan. Advised no unprotected intercourse within 2 weeks of her f/u appt if she desires IUD placement.    45 minutes were spent with the patient with >50% spent counseling on menstrual management.      Renate Ch MD  OB/GYN  5/30/2018 8:57 AM

## 2018-05-30 NOTE — LETTER
June 5, 2018      Yohana Robertson  [address] [place] [address] [address] [place] [address]    Dear ,      I am happy to inform you that your recent cervical cancer screening test (PAP smear) was normal.      Preventative screenings such as this help to ensure your health for years to come. You should repeat a pap smear in 3 years, unless otherwise directed.      You will still need to return to the clinic every year for your annual exam and other preventive tests.     Please contact the clinic at [phone] if you have further questions.       Sincerely,      Renate Ch MD

## 2018-05-31 ASSESSMENT — ANXIETY QUESTIONNAIRES: GAD7 TOTAL SCORE: 18

## 2018-05-31 ASSESSMENT — PATIENT HEALTH QUESTIONNAIRE - PHQ9: SUM OF ALL RESPONSES TO PHQ QUESTIONS 1-9: 22

## 2018-05-31 ASSESSMENT — ASTHMA QUESTIONNAIRES: ACT_TOTALSCORE: 20

## 2018-06-13 ENCOUNTER — OFFICE VISIT (OUTPATIENT)
Dept: OBGYN | Facility: OTHER | Age: 28
End: 2018-06-13
Attending: OBSTETRICS & GYNECOLOGY
Payer: COMMERCIAL

## 2018-06-13 VITALS
HEART RATE: 56 BPM | DIASTOLIC BLOOD PRESSURE: 74 MMHG | BODY MASS INDEX: 26.49 KG/M2 | SYSTOLIC BLOOD PRESSURE: 110 MMHG | WEIGHT: 154.31 LBS

## 2018-06-13 DIAGNOSIS — N92.0 EXCESSIVE OR FREQUENT MENSTRUATION: Primary | ICD-10-CM

## 2018-06-13 PROCEDURE — G0463 HOSPITAL OUTPT CLINIC VISIT: HCPCS

## 2018-06-13 PROCEDURE — 99213 OFFICE O/P EST LOW 20 MIN: CPT | Performed by: OBSTETRICS & GYNECOLOGY

## 2018-06-13 ASSESSMENT — ANXIETY QUESTIONNAIRES
2. NOT BEING ABLE TO STOP OR CONTROL WORRYING: NOT AT ALL
7. FEELING AFRAID AS IF SOMETHING AWFUL MIGHT HAPPEN: NOT AT ALL
GAD7 TOTAL SCORE: 5
6. BECOMING EASILY ANNOYED OR IRRITABLE: SEVERAL DAYS
5. BEING SO RESTLESS THAT IT IS HARD TO SIT STILL: MORE THAN HALF THE DAYS
1. FEELING NERVOUS, ANXIOUS, OR ON EDGE: NOT AT ALL
3. WORRYING TOO MUCH ABOUT DIFFERENT THINGS: NOT AT ALL
IF YOU CHECKED OFF ANY PROBLEMS ON THIS QUESTIONNAIRE, HOW DIFFICULT HAVE THESE PROBLEMS MADE IT FOR YOU TO DO YOUR WORK, TAKE CARE OF THINGS AT HOME, OR GET ALONG WITH OTHER PEOPLE: NOT DIFFICULT AT ALL

## 2018-06-13 ASSESSMENT — PAIN SCALES - GENERAL: PAINLEVEL: NO PAIN (0)

## 2018-06-13 ASSESSMENT — PATIENT HEALTH QUESTIONNAIRE - PHQ9: 5. POOR APPETITE OR OVEREATING: MORE THAN HALF THE DAYS

## 2018-06-13 NOTE — NURSING NOTE
Date of Surgery: 7-  Type of Surgery: Total Laparoscopic Hysterectomy Bilateral Salpingectomy Cystoscopy    Surgeon: Dr. Renate Ch MD      Patient's consents were signed and appropriate appointments were scheduled by the Unit [facility] . Patient was given surgical folder which includes pre-operative bathing instructions related to the two packets of Hibiclens surgical prep provided. Surgical forms were faxed to [phone] and [phone], copies made and kept for informative purposes, and originals were delivered to Day-surgery. Patient denies questions at this time.        Kelly Adames............. 6/13/2018 9:28 AM

## 2018-06-13 NOTE — NURSING NOTE
Patient presents today as a 2 week follow-up on heavy menses. She is leaning towards getting a hysterectomy.  Lela Conteh LPN  6/13/2018  9:04 AM

## 2018-06-13 NOTE — LETTER
2018     RE: Yohana Robertson  [address] [place] [address]     Dear Colleague,    Thank you for referring your patient, Yohana Robertson, to the [facility]. Please see a copy of my visit note below.    Follow-Up Visit    S: Ms. Yohana Robertson is a 27 year old  here for f/u of heavy menses. After last visit, she considered her options and desires hysterectomy.    O:  /74 (BP Location: Right arm, Patient Position: Sitting, Cuff Size: Adult Regular)  Pulse 56  Wt 70 kg (154 lb 5 oz)  LMP 2018  BMI 26.49 kg/m2  Gen: Well-appearing, NAD  Pulm: nonlabored  Psych: appropriate mood and affect    A/P:  Ms. Yohana Robertson is a 27 year old  here for hysterectomy consent and scheduling. Based on previous exam and concern for endometriosis, recommend a laparoscopic approach. Reviewed the risks and benefits of ovarian preservation if endometriosis is identified and she desires ovarian preservation with plan for 6 months of suppressive therapy postoperatively. Discussed the risks and benefits of hysterectomy, including risk of bleeding, infection, injury to surrounding organs, and need for additional surgery. She agrees to proceed and consents signed. Tentatively scheduled for 18.     Renate Ch MD  OB/GYN  2018 9:06 AM

## 2018-06-13 NOTE — MR AVS SNAPSHOT
After Visit Summary   6/13/2018    Yohana Robertson    MRN: [id number]           Patient Information     Date Of Birth          1990        Visit Information        Provider Department      6/13/2018 9:00 AM Renate Ch MD [facility]        Today's Diagnoses     Excessive or frequent menstruation    -  1       Follow-ups after your visit        Your next 10 appointments already scheduled     Jul 03, 2018  9:30 AM CDT   Pre-Op physical with Oscar Robertson MD   [facility]    [address]  [place] [facility] [address]   [phone]            Jul 27, 2018 10:15 AM CDT   SHORT with Renate Ch MD   [facility]    [address]  [place] [facility] [address]   [phone]            Aug 24, 2018  1:00 PM CDT   Office Visit with Renate Ch MD   [facility]    [address]  [place] [facility] [address]   [phone]           Bring a current list of meds and any records pertaining to this visit. For Physicals, please bring immunization records and any forms needing to be filled out. Please arrive 10 minutes early to complete paperwork.              Who to contact     If you have questions or need follow up information about today's clinic visit or your schedule please contact [facility] directly at [phone].  Normal or non-critical lab and imaging results will be communicated to you by MyChart, letter or phone within 4 business days after the clinic has received the results. If you do not hear from us within 7 days, please contact the clinic through Links[employer]t or phone. If you have a critical or abnormal lab result, we will notify you by phone as soon as possible.  Submit refill requests through [employer] or call your pharmacy and they will forward the  refill request to us. Please allow 3 business days for your refill to be completed.          Additional Information About Your Visit        MyChart Information     [employer] gives you secure access to your electronic health record. If you see a primary care provider, you can also send messages to your care team and make appointments. If you have questions, please call your primary care clinic.  If you do not have a primary care provider, please call [phone] and they will assist you.        Care EveryWhere ID     This is your Care EveryWhere ID. This could be used by other organizations to access your [place] medical records  [id number]        Your Vitals Were     Pulse Last Period BMI (Body Mass Index)             56 05/31/2018 26.49 kg/m2          Blood Pressure from Last 3 Encounters:   06/13/18 110/74   05/30/18 118/74   04/30/18 124/82    Weight from Last 3 Encounters:   06/13/18 70 kg (154 lb 5 oz)   05/30/18 70.5 kg (155 lb 7 oz)   04/30/18 72.4 kg (159 lb 9.6 oz)              Today, you had the following     No orders found for display       Primary Care Provider Office Phone # Fax #    Oscar BUCK MD Marcelo [phone]       [address] [facility] [address]        Equal Access to Services     DERRICK DÍAZ : Hadii aad ku hadasho Soomaali, waaxda luqadaha, qaybta kaalmada adeegyada, milly mata. So [facility] [phone].    ATENCIÓN: Si habla español, tiene a magallanes disposición servicios gratuitos de asistencia lingüística. Llame al [phone].    We comply with applicable federal civil rights laws and [place] laws. We do not discriminate on the basis of race, color, national origin, age, disability, sex, sexual orientation, or gender identity.            Thank you!     Thank you for choosing [facility]  for your care. Our goal is always to provide you with excellent care. Hearing back from our patients is one way we can continue to  improve our services. Please take a few minutes to complete the written survey that you may receive in the mail after your visit with us. Thank you!             Your Updated Medication List - Protect others around you: Learn how to safely use, store and throw away your medicines at www.disposemymeds.org.          This list is accurate as of 6/13/18  9:49 AM.  Always use your most recent med list.                   Brand Name Dispense Instructions for use Diagnosis    ibuprofen 600 MG tablet    ADVIL/MOTRIN     Take 600 mg by mouth 4 times daily as needed for pain Max dose 3200 mg in 24 hrs

## 2018-06-13 NOTE — PROGRESS NOTES
Follow-Up Visit    S: Ms. Yohana Robertson is a 27 year old  here for f/u of heavy menses. After last visit, she considered her options and desires hysterectomy.    O:  /74 (BP Location: Right arm, Patient Position: Sitting, Cuff Size: Adult Regular)  Pulse 56  Wt 70 kg (154 lb 5 oz)  LMP 2018  BMI 26.49 kg/m2  Gen: Well-appearing, NAD  Pulm: nonlabored  Psych: appropriate mood and affect    A/P:  Ms. Yohana Robertson is a 27 year old  here for hysterectomy consent and scheduling. Based on previous exam and concern for endometriosis, recommend a laparoscopic approach. Reviewed the risks and benefits of ovarian preservation if endometriosis is identified and she desires ovarian preservation with plan for 6 months of suppressive therapy postoperatively. Discussed the risks and benefits of hysterectomy, including risk of bleeding, infection, injury to surrounding organs, and need for additional surgery. She agrees to proceed and consents signed. Tentatively scheduled for 18.     Renate Ch MD  OB/GYN  2018 9:06 AM

## 2018-06-14 ASSESSMENT — ANXIETY QUESTIONNAIRES: GAD7 TOTAL SCORE: 5

## 2018-06-14 ASSESSMENT — PATIENT HEALTH QUESTIONNAIRE - PHQ9: SUM OF ALL RESPONSES TO PHQ QUESTIONS 1-9: 7

## 2018-06-14 ASSESSMENT — ASTHMA QUESTIONNAIRES: ACT_TOTALSCORE: 25

## 2018-06-29 ENCOUNTER — TELEPHONE (OUTPATIENT)
Dept: OBGYN | Facility: OTHER | Age: 28
End: 2018-06-29

## 2018-06-29 NOTE — TELEPHONE ENCOUNTER
Attempted to reach patient via phone to reschedule hysterectomy to 7/26/18 per ARIELLE. No answer. Will call back at a later time.    Katerina Ludwig RN...................6/29/2018 2:59 PM

## 2018-07-02 NOTE — TELEPHONE ENCOUNTER
Patient notified of date change for upcoming surgery. She will now be scheduled for 7/26/18. Patient was transferred to scheduling to reschedule appointments for PAC, pre-op and post-op.    Katerina Ludwig RN...................7/2/2018 9:20 AM

## 2018-07-03 ENCOUNTER — OFFICE VISIT (OUTPATIENT)
Dept: FAMILY MEDICINE | Facility: OTHER | Age: 28
End: 2018-07-03
Attending: FAMILY MEDICINE
Payer: COMMERCIAL

## 2018-07-03 VITALS
TEMPERATURE: 97.5 F | DIASTOLIC BLOOD PRESSURE: 62 MMHG | HEIGHT: 65 IN | RESPIRATION RATE: 16 BRPM | WEIGHT: 156.8 LBS | SYSTOLIC BLOOD PRESSURE: 94 MMHG | HEART RATE: 66 BPM | OXYGEN SATURATION: 99 % | BODY MASS INDEX: 26.12 KG/M2

## 2018-07-03 DIAGNOSIS — N92.4 EXCESSIVE BLEEDING IN PREMENOPAUSAL PERIOD: ICD-10-CM

## 2018-07-03 DIAGNOSIS — Z01.818 PREOPERATIVE EXAMINATION: Primary | ICD-10-CM

## 2018-07-03 DIAGNOSIS — Z87.891 FORMER SMOKER: ICD-10-CM

## 2018-07-03 DIAGNOSIS — J45.20 MILD INTERMITTENT ASTHMA WITHOUT COMPLICATION: ICD-10-CM

## 2018-07-03 PROBLEM — Z72.0 TOBACCO ABUSE: Status: RESOLVED | Noted: 2018-02-11 | Resolved: 2018-07-03

## 2018-07-03 LAB
ALBUMIN SERPL-MCNC: 4.9 G/DL (ref 3.5–5.7)
ALP SERPL-CCNC: 50 U/L (ref 34–104)
ALT SERPL W P-5'-P-CCNC: 10 U/L (ref 7–52)
ANION GAP SERPL CALCULATED.3IONS-SCNC: 6 MMOL/L (ref 3–14)
AST SERPL W P-5'-P-CCNC: 15 U/L (ref 13–39)
BASOPHILS # BLD AUTO: 0.1 10E9/L (ref 0–0.2)
BASOPHILS NFR BLD AUTO: 0.8 %
BILIRUB SERPL-MCNC: 0.6 MG/DL (ref 0.3–1)
BUN SERPL-MCNC: 13 MG/DL (ref 7–25)
CALCIUM SERPL-MCNC: 9.3 MG/DL (ref 8.6–10.3)
CHLORIDE SERPL-SCNC: 102 MMOL/L (ref 98–107)
CO2 SERPL-SCNC: 30 MMOL/L (ref 21–31)
CREAT SERPL-MCNC: 0.83 MG/DL (ref 0.6–1.2)
DIFFERENTIAL METHOD BLD: NORMAL
EOSINOPHIL # BLD AUTO: 0.1 10E9/L (ref 0–0.7)
EOSINOPHIL NFR BLD AUTO: 1.3 %
ERYTHROCYTE [DISTWIDTH] IN BLOOD BY AUTOMATED COUNT: 12.6 % (ref 10–15)
GFR SERPL CREATININE-BSD FRML MDRD: 82 ML/MIN/1.7M2
GLUCOSE SERPL-MCNC: 89 MG/DL (ref 70–105)
HCG UR QL: NEGATIVE
HCT VFR BLD AUTO: 46.5 % (ref 35–47)
HGB BLD-MCNC: 15.4 G/DL (ref 11.7–15.7)
IMM GRANULOCYTES # BLD: 0 10E9/L (ref 0–0.4)
IMM GRANULOCYTES NFR BLD: 0.3 %
LYMPHOCYTES # BLD AUTO: 1.7 10E9/L (ref 0.8–5.3)
LYMPHOCYTES NFR BLD AUTO: 27 %
MCH RBC QN AUTO: 31.4 PG (ref 26.5–33)
MCHC RBC AUTO-ENTMCNC: 33.1 G/DL (ref 31.5–36.5)
MCV RBC AUTO: 95 FL (ref 78–100)
MONOCYTES # BLD AUTO: 0.5 10E9/L (ref 0–1.3)
MONOCYTES NFR BLD AUTO: 7.4 %
NEUTROPHILS # BLD AUTO: 3.9 10E9/L (ref 1.6–8.3)
NEUTROPHILS NFR BLD AUTO: 63.2 %
PLATELET # BLD AUTO: 212 10E9/L (ref 150–450)
POTASSIUM SERPL-SCNC: 3.9 MMOL/L (ref 3.5–5.1)
PROT SERPL-MCNC: 7.2 G/DL (ref 6.4–8.9)
RBC # BLD AUTO: 4.91 10E12/L (ref 3.8–5.2)
SODIUM SERPL-SCNC: 138 MMOL/L (ref 134–144)
WBC # BLD AUTO: 6.2 10E9/L (ref 4–11)

## 2018-07-03 PROCEDURE — 80053 COMPREHEN METABOLIC PANEL: CPT | Performed by: FAMILY MEDICINE

## 2018-07-03 PROCEDURE — 99214 OFFICE O/P EST MOD 30 MIN: CPT | Performed by: FAMILY MEDICINE

## 2018-07-03 PROCEDURE — 81025 URINE PREGNANCY TEST: CPT | Performed by: FAMILY MEDICINE

## 2018-07-03 PROCEDURE — 36415 COLL VENOUS BLD VENIPUNCTURE: CPT | Performed by: FAMILY MEDICINE

## 2018-07-03 PROCEDURE — 85025 COMPLETE CBC W/AUTO DIFF WBC: CPT | Performed by: FAMILY MEDICINE

## 2018-07-03 PROCEDURE — G0463 HOSPITAL OUTPT CLINIC VISIT: HCPCS

## 2018-07-03 ASSESSMENT — PAIN SCALES - GENERAL: PAINLEVEL: NO PAIN (0)

## 2018-07-03 NOTE — NURSING NOTE
"Date of Surgery: 07/26/2018  Type of Surgery: Lap hyst  Surgeon: Dr Ch  Hospital:  [facility]    Fever/Chills or other infectious symptoms in past month: no  >10lb weight loss in past two months: no    Health Care Directive/Code status:  no  Hx of blood transfusions:   no   Td up to date:  yes  History of VRE/MRSA:  no     Preoperative Evaluation: Obstructive Sleep Apnea screening    S:Snore -  Do you snore loudly? (louder than talking or loud enough to be heard through closed doors)no  T: Tired - Do you often feel tired, fatigued, or sleepy during the daytime?yes  O: Observed - Has anyone ever observed you stop breathing during your sleep?no  P: Pressure - Do you have or are you being treated for high blood pressure?no  B: BMI - BMI greater than 35kg/m2?no  A: Age - Age over 50 years old?no  N: Neck - Neck circumference greater than 40 cm?no  G: Gender - Gender: Male?no    Total number of \"YES\" responses:  1    Scoring: Low risk of OZIEL 0-2  At Risk of OZIEL: >3 High Risk of OZIEL: 5-8    Jessica Guerrero LPN..................7/3/2018   9:39 AM          "

## 2018-07-03 NOTE — MR AVS SNAPSHOT
After Visit Summary   7/3/2018    Yohana Robertson    MRN: [id number]           Patient Information     Date Of Birth          1990        Visit Information        Provider Department      7/3/2018 9:30 AM Oscar Robertson MD [facility]        Today's Diagnoses     Preoperative examination    -  1    Excessive bleeding in premenopausal period        Former smoker        Mild intermittent asthma without complication           Follow-ups after your visit        Your next 10 appointments already scheduled     Jul 12, 2018   [facility]   [facility]    [address]  [place] [address]   [phone]            Aug 10, 2018 11:00 AM CDT   SHORT with [facility]ate Ch MD   [facility]    [address]  [place] [address]   [phone]            Sep 07, 2018 11:15 AM CDT   Office Visit with [facility] Ch [facility]   [facility]    [address]  [place] [address]   [phone]           Bring a current list of meds and any records pertaining to this visit. For Physicals, please bring immunization records and any forms needing to be filled out. Please arrive 10 minutes early to complete paperwork.              Who to contact     If you have questions or need follow up information about today's clinic visit or your schedule please contact [facility] directly at [phone].  Normal or non-critical lab and imaging results will be communicated to you by MyChart, letter or phone within 4 business days after the clinic has received the results. If you do not hear from us within 7 days, please contact the clinic through MyChart or phone. If you have a critical or abnormal lab result, we will notify you by phone as soon as  "possible.  Submit refill requests through [employer] or call your pharmacy and they will forward the refill request to us. Please allow 3 business days for your refill to be completed.          Additional Information About Your Visit        3D Control Systemshart Information     [employer] gives you secure access to your electronic health record. If you see a primary care provider, you can also send messages to your care team and make appointments. If you have questions, please call your primary care clinic.  If you do not have a primary care provider, please call [phone] and they will assist you.        Care EveryWhere ID     This is your Care EveryWhere ID. This could be used by other organizations to access your [place] medical records  [id number]        Your Vitals Were     Pulse Temperature Respirations Height Last Period Pulse Oximetry    66 97.5  F (36.4  C) (Tympanic) 16 5' 5.25\" (1.657 m) 07/01/2018 99%    Breastfeeding? BMI (Body Mass Index)                No 25.89 kg/m2           Blood Pressure from Last 3 Encounters:   07/03/18 94/62   06/13/18 110/74   05/30/18 118/74    Weight from Last 3 Encounters:   07/03/18 156 lb 12.8 oz (71.1 kg)   06/13/18 154 lb 5 oz (70 kg)   05/30/18 155 lb 7 oz (70.5 kg)              We Performed the Following     CBC with platelets differential     Comprehensive metabolic panel     Pregnancy Urine (HCG)        Primary Care Provider Office Phone # Fax #    Oscar BUCK MD Marcelo [phone] [address] [facility] [address]        Equal Access to Services     [facility] : Hadii aad ku hadasho Soomaali, waaxda luqadaha, qaybta kaalmada adeegyada, milly mata. So [facility] [phone].    ATENCIÓN: Si habla español, tiene a magallanes disposición servicios gratuitos de asistencia lingüística. Llame al [phone].    We comply with applicable federal civil rights laws and [place] laws. We do not discriminate on the basis of race, color, " national origin, age, disability, sex, sexual orientation, or gender identity.            Thank you!     Thank you for choosing [facility]  for your care. Our goal is always to provide you with excellent care. Hearing back from our patients is one way we can continue to improve our services. Please take a few minutes to complete the written survey that you may receive in the mail after your visit with us. Thank you!             Your Updated Medication List - Protect others around you: Learn how to safely use, store and throw away your medicines at www.disposemymeds.org.          This list is accurate as of 7/3/18  1:44 PM.  Always use your most recent med list.                   Brand Name Dispense Instructions for use Diagnosis    ibuprofen 600 MG tablet    ADVIL/MOTRIN     Take 600 mg by mouth 4 times daily as needed for pain Max dose 3200 mg in 24 hrs

## 2018-07-04 ASSESSMENT — ASTHMA QUESTIONNAIRES: ACT_TOTALSCORE: 24

## 2018-07-25 ENCOUNTER — ANESTHESIA EVENT (OUTPATIENT)
Dept: SURGERY | Facility: OTHER | Age: 28
End: 2018-07-25
Payer: COMMERCIAL

## 2018-07-25 RX ORDER — NALOXONE HYDROCHLORIDE 0.4 MG/ML
.1-.4 INJECTION, SOLUTION INTRAMUSCULAR; INTRAVENOUS; SUBCUTANEOUS
Status: CANCELLED | OUTPATIENT
Start: 2018-07-25 | End: 2018-07-26

## 2018-07-26 ENCOUNTER — SURGERY (OUTPATIENT)
Age: 28
End: 2018-07-26

## 2018-07-26 ENCOUNTER — ANESTHESIA (OUTPATIENT)
Dept: SURGERY | Facility: OTHER | Age: 28
End: 2018-07-26
Payer: COMMERCIAL

## 2018-07-26 ENCOUNTER — HOSPITAL ENCOUNTER (OUTPATIENT)
Facility: OTHER | Age: 28
Discharge: HOME | End: 2018-07-27
Attending: OBSTETRICS & GYNECOLOGY | Admitting: OBSTETRICS & GYNECOLOGY
Payer: COMMERCIAL

## 2018-07-26 DIAGNOSIS — Z90.710 S/P LAPAROSCOPIC HYSTERECTOMY: Primary | ICD-10-CM

## 2018-07-26 LAB
ABO + RH BLD: NORMAL
ABO + RH BLD: NORMAL
BLD GP AB SCN SERPL QL: NORMAL
BLOOD BANK CMNT PATIENT-IMP: NORMAL
ERYTHROCYTE [DISTWIDTH] IN BLOOD BY AUTOMATED COUNT: 12.6 % (ref 10–15)
HCG UR QL: NEGATIVE
HCT VFR BLD AUTO: 42.7 % (ref 35–47)
HGB BLD-MCNC: 14.4 G/DL (ref 11.7–15.7)
MCH RBC QN AUTO: 31.4 PG (ref 26.5–33)
MCHC RBC AUTO-ENTMCNC: 33.7 G/DL (ref 31.5–36.5)
MCV RBC AUTO: 93 FL (ref 78–100)
PLATELET # BLD AUTO: 200 10E9/L (ref 150–450)
RBC # BLD AUTO: 4.59 10E12/L (ref 3.8–5.2)
SPECIMEN EXP DATE BLD: NORMAL
WBC # BLD AUTO: 6.2 10E9/L (ref 4–11)

## 2018-07-26 PROCEDURE — 25000128 H RX IP 250 OP 636: Performed by: NURSE ANESTHETIST, CERTIFIED REGISTERED

## 2018-07-26 PROCEDURE — 27210794 ZZH OR GENERAL SUPPLY STERILE: Performed by: OBSTETRICS & GYNECOLOGY

## 2018-07-26 PROCEDURE — 37000008 ZZH ANESTHESIA TECHNICAL FEE, 1ST 30 MIN: Performed by: OBSTETRICS & GYNECOLOGY

## 2018-07-26 PROCEDURE — 71000014 ZZH RECOVERY PHASE 1 LEVEL 2 FIRST HR: Performed by: OBSTETRICS & GYNECOLOGY

## 2018-07-26 PROCEDURE — 58571 TLH W/T/O 250 G OR LESS: CPT | Performed by: NURSE ANESTHETIST, CERTIFIED REGISTERED

## 2018-07-26 PROCEDURE — 40000306 ZZH STATISTIC PRE PROC ASSESS II: Performed by: OBSTETRICS & GYNECOLOGY

## 2018-07-26 PROCEDURE — 25000128 H RX IP 250 OP 636: Performed by: OBSTETRICS & GYNECOLOGY

## 2018-07-26 PROCEDURE — 36415 COLL VENOUS BLD VENIPUNCTURE: CPT | Performed by: OBSTETRICS & GYNECOLOGY

## 2018-07-26 PROCEDURE — 86850 RBC ANTIBODY SCREEN: CPT | Performed by: OBSTETRICS & GYNECOLOGY

## 2018-07-26 PROCEDURE — 25000125 ZZHC RX 250: Performed by: OBSTETRICS & GYNECOLOGY

## 2018-07-26 PROCEDURE — 86900 BLOOD TYPING SEROLOGIC ABO: CPT | Performed by: OBSTETRICS & GYNECOLOGY

## 2018-07-26 PROCEDURE — 58571 TLH W/T/O 250 G OR LESS: CPT | Performed by: OBSTETRICS & GYNECOLOGY

## 2018-07-26 PROCEDURE — 27210995 ZZH RX 272: Performed by: OBSTETRICS & GYNECOLOGY

## 2018-07-26 PROCEDURE — 25800025 ZZH RX 258: Performed by: OBSTETRICS & GYNECOLOGY

## 2018-07-26 PROCEDURE — 37000009 ZZH ANESTHESIA TECHNICAL FEE, EACH ADDTL 15 MIN: Performed by: OBSTETRICS & GYNECOLOGY

## 2018-07-26 PROCEDURE — 85027 COMPLETE CBC AUTOMATED: CPT | Performed by: OBSTETRICS & GYNECOLOGY

## 2018-07-26 PROCEDURE — 86901 BLOOD TYPING SEROLOGIC RH(D): CPT | Performed by: OBSTETRICS & GYNECOLOGY

## 2018-07-26 PROCEDURE — 58571 TLH W/T/O 250 G OR LESS: CPT | Mod: 80 | Performed by: OBSTETRICS & GYNECOLOGY

## 2018-07-26 PROCEDURE — 25000125 ZZHC RX 250: Performed by: NURSE ANESTHETIST, CERTIFIED REGISTERED

## 2018-07-26 PROCEDURE — 81025 URINE PREGNANCY TEST: CPT | Performed by: OBSTETRICS & GYNECOLOGY

## 2018-07-26 PROCEDURE — 36000093 ZZH SURGERY LEVEL 4 1ST 30 MIN: Performed by: OBSTETRICS & GYNECOLOGY

## 2018-07-26 PROCEDURE — 36000063 ZZH SURGERY LEVEL 4 EA 15 ADDTL MIN: Performed by: OBSTETRICS & GYNECOLOGY

## 2018-07-26 PROCEDURE — 25000132 ZZH RX MED GY IP 250 OP 250 PS 637: Performed by: OBSTETRICS & GYNECOLOGY

## 2018-07-26 PROCEDURE — 88307 TISSUE EXAM BY PATHOLOGIST: CPT | Performed by: OBSTETRICS & GYNECOLOGY

## 2018-07-26 RX ORDER — CEFAZOLIN SODIUM 2 G/100ML
2 INJECTION, SOLUTION INTRAVENOUS
Status: COMPLETED | OUTPATIENT
Start: 2018-07-26 | End: 2018-07-26

## 2018-07-26 RX ORDER — LIDOCAINE 40 MG/G
CREAM TOPICAL
Status: DISCONTINUED | OUTPATIENT
Start: 2018-07-26 | End: 2018-07-27 | Stop reason: HOSPADM

## 2018-07-26 RX ORDER — SODIUM CHLORIDE, SODIUM LACTATE, POTASSIUM CHLORIDE, CALCIUM CHLORIDE 600; 310; 30; 20 MG/100ML; MG/100ML; MG/100ML; MG/100ML
INJECTION, SOLUTION INTRAVENOUS CONTINUOUS
Status: DISCONTINUED | OUTPATIENT
Start: 2018-07-26 | End: 2018-07-27 | Stop reason: HOSPADM

## 2018-07-26 RX ORDER — ONDANSETRON 2 MG/ML
INJECTION INTRAMUSCULAR; INTRAVENOUS PRN
Status: DISCONTINUED | OUTPATIENT
Start: 2018-07-26 | End: 2018-07-26

## 2018-07-26 RX ORDER — IBUPROFEN 600 MG/1
600 TABLET, FILM COATED ORAL EVERY 6 HOURS PRN
Status: DISCONTINUED | OUTPATIENT
Start: 2018-07-26 | End: 2018-07-27 | Stop reason: HOSPADM

## 2018-07-26 RX ORDER — FUROSEMIDE 10 MG/ML
INJECTION INTRAMUSCULAR; INTRAVENOUS PRN
Status: DISCONTINUED | OUTPATIENT
Start: 2018-07-26 | End: 2018-07-26

## 2018-07-26 RX ORDER — KETOROLAC TROMETHAMINE 30 MG/ML
INJECTION, SOLUTION INTRAMUSCULAR; INTRAVENOUS PRN
Status: DISCONTINUED | OUTPATIENT
Start: 2018-07-26 | End: 2018-07-26

## 2018-07-26 RX ORDER — PROPOFOL 10 MG/ML
INJECTION, EMULSION INTRAVENOUS PRN
Status: DISCONTINUED | OUTPATIENT
Start: 2018-07-26 | End: 2018-07-26

## 2018-07-26 RX ORDER — ONDANSETRON 4 MG/1
4 TABLET, ORALLY DISINTEGRATING ORAL EVERY 6 HOURS PRN
Status: DISCONTINUED | OUTPATIENT
Start: 2018-07-26 | End: 2018-07-27 | Stop reason: HOSPADM

## 2018-07-26 RX ORDER — ONDANSETRON 2 MG/ML
4 INJECTION INTRAMUSCULAR; INTRAVENOUS EVERY 30 MIN PRN
Status: DISCONTINUED | OUTPATIENT
Start: 2018-07-26 | End: 2018-07-26 | Stop reason: HOSPADM

## 2018-07-26 RX ORDER — CEFAZOLIN SODIUM 1 G/50ML
1 INJECTION, SOLUTION INTRAVENOUS SEE ADMIN INSTRUCTIONS
Status: DISCONTINUED | OUTPATIENT
Start: 2018-07-26 | End: 2018-07-26 | Stop reason: HOSPADM

## 2018-07-26 RX ORDER — PROCHLORPERAZINE MALEATE 10 MG
10 TABLET ORAL EVERY 6 HOURS PRN
Status: DISCONTINUED | OUTPATIENT
Start: 2018-07-26 | End: 2018-07-27 | Stop reason: HOSPADM

## 2018-07-26 RX ORDER — ONDANSETRON 4 MG/1
4 TABLET, ORALLY DISINTEGRATING ORAL EVERY 30 MIN PRN
Status: DISCONTINUED | OUTPATIENT
Start: 2018-07-26 | End: 2018-07-26 | Stop reason: HOSPADM

## 2018-07-26 RX ORDER — NALOXONE HYDROCHLORIDE 0.4 MG/ML
.1-.4 INJECTION, SOLUTION INTRAMUSCULAR; INTRAVENOUS; SUBCUTANEOUS
Status: DISCONTINUED | OUTPATIENT
Start: 2018-07-26 | End: 2018-07-26

## 2018-07-26 RX ORDER — DEXAMETHASONE SODIUM PHOSPHATE 4 MG/ML
INJECTION, SOLUTION INTRA-ARTICULAR; INTRALESIONAL; INTRAMUSCULAR; INTRAVENOUS; SOFT TISSUE PRN
Status: DISCONTINUED | OUTPATIENT
Start: 2018-07-26 | End: 2018-07-26

## 2018-07-26 RX ORDER — KETAMINE HYDROCHLORIDE 50 MG/ML
INJECTION, SOLUTION INTRAMUSCULAR; INTRAVENOUS PRN
Status: DISCONTINUED | OUTPATIENT
Start: 2018-07-26 | End: 2018-07-26

## 2018-07-26 RX ORDER — ONDANSETRON 2 MG/ML
4 INJECTION INTRAMUSCULAR; INTRAVENOUS EVERY 6 HOURS PRN
Status: DISCONTINUED | OUTPATIENT
Start: 2018-07-26 | End: 2018-07-27 | Stop reason: HOSPADM

## 2018-07-26 RX ORDER — NEOSTIGMINE METHYLSULFATE 1 MG/ML
VIAL (ML) INJECTION PRN
Status: DISCONTINUED | OUTPATIENT
Start: 2018-07-26 | End: 2018-07-26

## 2018-07-26 RX ORDER — METOCLOPRAMIDE 10 MG/1
10 TABLET ORAL EVERY 6 HOURS PRN
Status: DISCONTINUED | OUTPATIENT
Start: 2018-07-26 | End: 2018-07-27 | Stop reason: HOSPADM

## 2018-07-26 RX ORDER — HYDROCODONE BITARTRATE AND ACETAMINOPHEN 5; 325 MG/1; MG/1
1-2 TABLET ORAL EVERY 4 HOURS PRN
Status: DISCONTINUED | OUTPATIENT
Start: 2018-07-26 | End: 2018-07-27 | Stop reason: HOSPADM

## 2018-07-26 RX ORDER — SODIUM CHLORIDE, SODIUM LACTATE, POTASSIUM CHLORIDE, CALCIUM CHLORIDE 600; 310; 30; 20 MG/100ML; MG/100ML; MG/100ML; MG/100ML
INJECTION, SOLUTION INTRAVENOUS CONTINUOUS
Status: DISCONTINUED | OUTPATIENT
Start: 2018-07-26 | End: 2018-07-26 | Stop reason: HOSPADM

## 2018-07-26 RX ORDER — LIDOCAINE HYDROCHLORIDE 20 MG/ML
INJECTION, SOLUTION INFILTRATION; PERINEURAL PRN
Status: DISCONTINUED | OUTPATIENT
Start: 2018-07-26 | End: 2018-07-26

## 2018-07-26 RX ORDER — NALOXONE HYDROCHLORIDE 0.4 MG/ML
.1-.4 INJECTION, SOLUTION INTRAMUSCULAR; INTRAVENOUS; SUBCUTANEOUS
Status: DISCONTINUED | OUTPATIENT
Start: 2018-07-26 | End: 2018-07-27 | Stop reason: HOSPADM

## 2018-07-26 RX ORDER — ACETAMINOPHEN 10 MG/ML
INJECTION, SOLUTION INTRAVENOUS PRN
Status: DISCONTINUED | OUTPATIENT
Start: 2018-07-26 | End: 2018-07-26

## 2018-07-26 RX ORDER — FENTANYL CITRATE 50 UG/ML
25-50 INJECTION, SOLUTION INTRAMUSCULAR; INTRAVENOUS
Status: DISCONTINUED | OUTPATIENT
Start: 2018-07-26 | End: 2018-07-26 | Stop reason: HOSPADM

## 2018-07-26 RX ORDER — FENTANYL CITRATE 50 UG/ML
INJECTION, SOLUTION INTRAMUSCULAR; INTRAVENOUS PRN
Status: DISCONTINUED | OUTPATIENT
Start: 2018-07-26 | End: 2018-07-26

## 2018-07-26 RX ORDER — PROPOFOL 10 MG/ML
INJECTION, EMULSION INTRAVENOUS CONTINUOUS PRN
Status: DISCONTINUED | OUTPATIENT
Start: 2018-07-26 | End: 2018-07-26

## 2018-07-26 RX ORDER — LIDOCAINE 40 MG/G
CREAM TOPICAL
Status: DISCONTINUED | OUTPATIENT
Start: 2018-07-26 | End: 2018-07-26 | Stop reason: HOSPADM

## 2018-07-26 RX ORDER — GLYCOPYRROLATE 0.2 MG/ML
INJECTION, SOLUTION INTRAMUSCULAR; INTRAVENOUS PRN
Status: DISCONTINUED | OUTPATIENT
Start: 2018-07-26 | End: 2018-07-26

## 2018-07-26 RX ORDER — BUPIVACAINE HYDROCHLORIDE AND EPINEPHRINE 5; 5 MG/ML; UG/ML
INJECTION, SOLUTION PERINEURAL PRN
Status: DISCONTINUED | OUTPATIENT
Start: 2018-07-26 | End: 2018-07-26 | Stop reason: HOSPADM

## 2018-07-26 RX ORDER — PHENAZOPYRIDINE HYDROCHLORIDE 100 MG/1
200 TABLET, FILM COATED ORAL ONCE
Status: COMPLETED | OUTPATIENT
Start: 2018-07-26 | End: 2018-07-26

## 2018-07-26 RX ORDER — METOCLOPRAMIDE HYDROCHLORIDE 5 MG/ML
10 INJECTION INTRAMUSCULAR; INTRAVENOUS EVERY 6 HOURS PRN
Status: DISCONTINUED | OUTPATIENT
Start: 2018-07-26 | End: 2018-07-27 | Stop reason: HOSPADM

## 2018-07-26 RX ADMIN — LIDOCAINE HYDROCHLORIDE 0.1 ML: 10 INJECTION, SOLUTION EPIDURAL; INFILTRATION; INTRACAUDAL; PERINEURAL at 08:42

## 2018-07-26 RX ADMIN — ACETAMINOPHEN 1000 MG: 10 INJECTION, SOLUTION INTRAVENOUS at 09:45

## 2018-07-26 RX ADMIN — FENTANYL CITRATE 100 MCG: 50 INJECTION, SOLUTION INTRAMUSCULAR; INTRAVENOUS at 10:10

## 2018-07-26 RX ADMIN — ROCURONIUM BROMIDE 40 MG: 10 INJECTION INTRAVENOUS at 09:26

## 2018-07-26 RX ADMIN — SODIUM CHLORIDE 400 ML: 900 IRRIGANT IRRIGATION at 10:59

## 2018-07-26 RX ADMIN — NEOSTIGMINE METHYLSULFATE 3 MG: 1 INJECTION INTRAVENOUS at 10:57

## 2018-07-26 RX ADMIN — PROPOFOL 250 MCG/KG/MIN: 10 INJECTION, EMULSION INTRAVENOUS at 09:27

## 2018-07-26 RX ADMIN — FENTANYL CITRATE 100 MCG: 50 INJECTION, SOLUTION INTRAMUSCULAR; INTRAVENOUS at 11:25

## 2018-07-26 RX ADMIN — HYDROMORPHONE HYDROCHLORIDE 2 MG: 1 INJECTION, SOLUTION INTRAMUSCULAR; INTRAVENOUS; SUBCUTANEOUS at 11:01

## 2018-07-26 RX ADMIN — ROCURONIUM BROMIDE 10 MG: 10 INJECTION INTRAVENOUS at 09:30

## 2018-07-26 RX ADMIN — SODIUM CHLORIDE, SODIUM LACTATE, POTASSIUM CHLORIDE, AND CALCIUM CHLORIDE: 600; 310; 30; 20 INJECTION, SOLUTION INTRAVENOUS at 10:49

## 2018-07-26 RX ADMIN — BUPIVACAINE HYDROCHLORIDE AND EPINEPHRINE BITARTRATE 10 ML: 5; .0091 INJECTION, SOLUTION PERINEURAL at 10:57

## 2018-07-26 RX ADMIN — ONDANSETRON 4 MG: 2 INJECTION INTRAMUSCULAR; INTRAVENOUS at 09:26

## 2018-07-26 RX ADMIN — IBUPROFEN 600 MG: 600 TABLET ORAL at 15:33

## 2018-07-26 RX ADMIN — CEFAZOLIN SODIUM 2 G: 2 INJECTION, SOLUTION INTRAVENOUS at 09:28

## 2018-07-26 RX ADMIN — WATER 200 ML: 1 IRRIGANT IRRIGATION at 11:12

## 2018-07-26 RX ADMIN — GLYCOPYRROLATE 0.2 MG: 0.2 INJECTION, SOLUTION INTRAMUSCULAR; INTRAVENOUS at 09:46

## 2018-07-26 RX ADMIN — SODIUM CHLORIDE, SODIUM LACTATE, POTASSIUM CHLORIDE, AND CALCIUM CHLORIDE: 600; 310; 30; 20 INJECTION, SOLUTION INTRAVENOUS at 15:42

## 2018-07-26 RX ADMIN — DEXAMETHASONE SODIUM PHOSPHATE 4 MG: 4 INJECTION, SOLUTION INTRA-ARTICULAR; INTRALESIONAL; INTRAMUSCULAR; INTRAVENOUS; SOFT TISSUE at 09:26

## 2018-07-26 RX ADMIN — ROCURONIUM BROMIDE 10 MG: 10 INJECTION INTRAVENOUS at 10:20

## 2018-07-26 RX ADMIN — HYDROCODONE BITARTRATE AND ACETAMINOPHEN 2 TABLET: 5; 325 TABLET ORAL at 17:27

## 2018-07-26 RX ADMIN — ROCURONIUM BROMIDE 20 MG: 10 INJECTION INTRAVENOUS at 10:10

## 2018-07-26 RX ADMIN — SODIUM CHLORIDE, SODIUM LACTATE, POTASSIUM CHLORIDE, AND CALCIUM CHLORIDE: 600; 310; 30; 20 INJECTION, SOLUTION INTRAVENOUS at 23:41

## 2018-07-26 RX ADMIN — HYDROCODONE BITARTRATE AND ACETAMINOPHEN 1 TABLET: 5; 325 TABLET ORAL at 21:25

## 2018-07-26 RX ADMIN — FENTANYL CITRATE 50 MCG: 50 INJECTION, SOLUTION INTRAMUSCULAR; INTRAVENOUS at 11:46

## 2018-07-26 RX ADMIN — PROPOFOL 200 MG: 10 INJECTION, EMULSION INTRAVENOUS at 09:26

## 2018-07-26 RX ADMIN — FENTANYL CITRATE 50 MCG: 50 INJECTION, SOLUTION INTRAMUSCULAR; INTRAVENOUS at 09:26

## 2018-07-26 RX ADMIN — KETAMINE HYDROCHLORIDE 30 MG: 50 INJECTION, SOLUTION INTRAMUSCULAR; INTRAVENOUS at 09:26

## 2018-07-26 RX ADMIN — HYDROCODONE BITARTRATE AND ACETAMINOPHEN 1 TABLET: 5; 325 TABLET ORAL at 13:06

## 2018-07-26 RX ADMIN — ROCURONIUM BROMIDE 10 MG: 10 INJECTION INTRAVENOUS at 10:48

## 2018-07-26 RX ADMIN — GLYCOPYRROLATE 0.6 MG: 0.2 INJECTION, SOLUTION INTRAMUSCULAR; INTRAVENOUS at 10:57

## 2018-07-26 RX ADMIN — FENTANYL CITRATE 100 MCG: 50 INJECTION, SOLUTION INTRAMUSCULAR; INTRAVENOUS at 09:48

## 2018-07-26 RX ADMIN — LIDOCAINE HYDROCHLORIDE 100 MG: 20 INJECTION, SOLUTION INFILTRATION; PERINEURAL at 09:26

## 2018-07-26 RX ADMIN — MIDAZOLAM 2 MG: 1 INJECTION INTRAMUSCULAR; INTRAVENOUS at 09:26

## 2018-07-26 RX ADMIN — PHENAZOPYRIDINE HYDROCHLORIDE 200 MG: 100 TABLET ORAL at 08:27

## 2018-07-26 RX ADMIN — SODIUM CHLORIDE, SODIUM LACTATE, POTASSIUM CHLORIDE, AND CALCIUM CHLORIDE: 600; 310; 30; 20 INJECTION, SOLUTION INTRAVENOUS at 08:43

## 2018-07-26 RX ADMIN — HYDROCODONE BITARTRATE AND ACETAMINOPHEN 1 TABLET: 5; 325 TABLET ORAL at 23:40

## 2018-07-26 RX ADMIN — KETOROLAC TROMETHAMINE 30 MG: 30 INJECTION, SOLUTION INTRAMUSCULAR at 09:36

## 2018-07-26 RX ADMIN — PHENYLEPHRINE HYDROCHLORIDE 200 MCG: 10 INJECTION, SOLUTION INTRAMUSCULAR; INTRAVENOUS; SUBCUTANEOUS at 09:40

## 2018-07-26 RX ADMIN — FUROSEMIDE 10 MG: 10 INJECTION, SOLUTION INTRAVENOUS at 11:08

## 2018-07-26 NOTE — IP AVS SNAPSHOT
MRN:[id number]                      After Visit Summary   7/26/2018    Yohana Robertson    MRN: [id number]           Thank you!     Thank you for choosing [facility] for your care. Our goal is always to provide you with excellent care. Hearing back from our patients is one way we can continue to improve our services. Please take a few minutes to complete the written survey that you may receive in the mail after you visit with us. Thank you!        Patient Information     Date Of Birth          1990        About your hospital stay     You were admitted on:  July 26, 2018 You last received care in the:  [facility]    You were discharged on:  July 27, 2018       Who to Call     For medical emergencies, please call 911.  For non-urgent questions about your medical care, please call your primary care provider or clinic, [phone]  For questions related to your surgery, please call your surgery clinic        Attending Provider     Provider Specialty    Renate Ch MD OB/Gyn       Primary Care Provider Office Phone # Fax #    Oscar BUCK MD Marcelo [phone]      After Care Instructions     Diet Instructions       Resume pre-procedure diet            Discharge Instructions       Patient to follow up with appointment in 2 and 6 weeks            Discharge Instructions       Pelvic rest: no sex, tampons or douching for 6 weeks            No Alcohol       For 24 hours post procedure            No driving or operating machinery        until the day after procedure            No lifting        No lifting over 15 lbs and no strenuous physical activity for 6 weeks                  Your next 10 appointments already scheduled     Aug 10, 2018 11:00 AM CDT   SHORT with Renate Ch MD   [facility] ([facility])    [address]  [place] [address]   [phone]            Sep 07, 2018 11:15 AM CDT    Office Visit with Renate Ch MD   [facility] ([facility])    [address]  [place] [address] [phone]           Bring a current list of meds and any records pertaining to this visit. For Physicals, please bring immunization records and any forms needing to be filled out. Please arrive 10 minutes early to complete paperwork.              Pending Results     Date and Time Order Name Status Description    7/26/2018 1059 Surgical pathology exam In process             Statement of Approval     Ordered          07/27/18 0821  I have reviewed and agree with all the recommendations and orders detailed in this document.  EFFECTIVE NOW     Approved and electronically signed by:  Renate Ch MD             Admission Information     Date & Time Provider Department Dept. Phone    7/26/2018 Renate Ch MD [facility] [phone]      Your Vitals Were     Blood Pressure Pulse Temperature Respirations Weight Last Period    129/83 (BP Location: Left arm) 58 97.7  F (36.5  C) (Tympanic) 20 70.1 kg (154 lb 8.7 oz) 07/01/2018    Pulse Oximetry BMI (Body Mass Index)                98% 25.52 kg/m2          [facility] Information     [employer] gives you secure access to your electronic health record. If you see a primary care provider, you can also send messages to your care team and make appointments. If you have questions, please call your primary care clinic.  If you do not have a primary care provider, please call [phone] and they will assist you.        Care EveryWhere ID     This is your Care EveryWhere ID. This could be used by other organizations to access your [place] medical records  [id number]        Equal Access to Services     [facility]: Hadii belgica Perez, waaxda luqadaha, qaybta milly waite. So [facility] [phone].    ATENCIÓN: Martha quiñones,  tiene a magallanes disposición servicios gratuitos de asistencia lingüística. Marek camilo [phone].    We comply with applicable federal civil rights laws and [place] laws. We do not discriminate on the basis of race, color, national origin, age, disability, sex, sexual orientation, or gender identity.               Review of your medicines      START taking        Dose / Directions    HYDROcodone-acetaminophen 5-325 MG per tablet   Commonly known as:  NORCO        Dose:  1-2 tablet   Take 1-2 tablets by mouth every 4 hours as needed for other (pain control or improvement in physical function. Hold dose for analgesic side effects.)   Quantity:  20 tablet   Refills:  0         CONTINUE these medicines which have NOT CHANGED        Dose / Directions    ibuprofen 600 MG tablet   Commonly known as:  ADVIL/MOTRIN        Dose:  600 mg   Take 600 mg by mouth 4 times daily as needed for pain Max dose 3200 mg in 24 hrs   Refills:  0            Where to get your medicines      Some of these will need a paper prescription and others can be bought over the counter. Ask your nurse if you have questions.     Bring a paper prescription for each of these medications     HYDROcodone-acetaminophen 5-325 MG per tablet                Protect others around you: Learn how to safely use, store and throw away your medicines at www.disposemymeds.org.        Information about OPIOIDS     PRESCRIPTION OPIOIDS: WHAT YOU NEED TO KNOW   We gave you an opioid (narcotic) pain medicine. It is important to manage your pain, but opioids are not always the best choice. You should first try all the other options your care team gave you. Take this medicine for as short a time (and as few doses) as possible.     These medicines have risks:    DO NOT drive when on new or higher doses of pain medicine. These medicines can affect your alertness and reaction times, and you could be arrested for driving under the influence (DUI). If you need to use opioids  long-term, talk to your care team about driving.    DO NOT operate heave machinery    DO NOT do any other dangerous activities while taking these medicines.     DO NOT drink any alcohol while taking these medicines.      If the opioid prescribed includes acetaminophen, DO NOT take with any other medicines that contain acetaminophen. Read all labels carefully. Look for the word  acetaminophen  or  Tylenol.  Ask your pharmacist if you have questions or are unsure.    You can get addicted to pain medicines, especially if you have a history of addiction (chemical, alcohol or substance dependence). Talk to your care team about ways to reduce this risk.    Store your pills in a secure place, locked if possible. We will not replace any lost or stolen medicine. If you don t finish your medicine, please throw away (dispose) as directed by your pharmacist. The [place] Pollution Control Agency has more information about safe disposal: https://www.pca.[facility].[place].us/living-green/managing-unwanted-medications.     All opioids tend to cause constipation. Drink plenty of water and eat foods that have a lot of fiber, such as fruits, vegetables, prune juice, apple juice and high-fiber cereal. Take a laxative (Miralax, milk of magnesia, Colace, Senna) if you don t move your bowels at least every other day.              Medication List: This is a list of all your medications and when to take them. Check marks below indicate your daily home schedule. Keep this list as a reference.      Medications           Morning Afternoon Evening Bedtime As Needed    HYDROcodone-acetaminophen 5-325 MG per tablet   Commonly known as:  NORCO   Take 1-2 tablets by mouth every 4 hours as needed for other (pain control or improvement in physical function. Hold dose for analgesic side effects.)   Last time this was given:  1 tablet on 7/27/2018  1:50 PM                                ibuprofen 600 MG tablet   Commonly known as:  ADVIL/MOTRIN   Take 600 mg  by mouth 4 times daily as needed for pain Max dose 3200 mg in 24 hrs   Last time this was given:  600 mg on 7/27/2018  8:26 AM

## 2018-07-26 NOTE — ANESTHESIA PREPROCEDURE EVALUATION
Anesthesia Evaluation     . Pt has had prior anesthetic.            ROS/MED HX    ENT/Pulmonary:  - neg pulmonary ROS   (+)asthma , . .    Neurologic:  - neg neurologic ROS     Cardiovascular:  - neg cardiovascular ROS       METS/Exercise Tolerance:  >4 METS   Hematologic:  - neg hematologic  ROS       Musculoskeletal:  - neg musculoskeletal ROS       GI/Hepatic:  - neg GI/hepatic ROS       Renal/Genitourinary:  - ROS Renal section negative       Endo:  - neg endo ROS       Psychiatric:  - neg psychiatric ROS       Infectious Disease:  - neg infectious disease ROS       Malignancy:      - no malignancy   Other:    - neg other ROS                 Physical Exam  Normal systems: cardiovascular and pulmonary    Airway   Mallampati: I  TM distance: >3 FB  Neck ROM: full    Dental   (+) missing and partials    Cardiovascular   Rhythm and rate: regular and normal      Pulmonary    breath sounds clear to auscultation                    Anesthesia Plan      History & Physical Review      ASA Status:  1 .    NPO Status:  > 8 hours    Plan for General with Intravenous induction. Maintenance will be Inhalation.    PONV prophylaxis:  Ondansetron (or other 5HT-3) and Dexamethasone or Solumedrol       Postoperative Care  Postoperative pain management:  IV analgesics.      Consents  Anesthetic plan, risks, benefits and alternatives discussed with:  Patient..                          .

## 2018-07-26 NOTE — PLAN OF CARE
Problem: Patient Care Overview  Goal: Plan of Care/Patient Progress Review  Outcome: Improving  Pt A&O, HR remains bradycardic, other VSS. [place] output adequate, urine is clear and yellow/orange. CMS intact, abdominal dressing and johnson pad have small amount of old drainage. IV infusing, SCDs on, pt denies nausea, call light within reach,  at bedside.    Problem: Pain, Acute (Adult)  Goal: Identify Related Risk Factors and Signs and Symptoms  Related risk factors and signs and symptoms are identified upon initiation of Human Response Clinical Practice Guideline (CPG).   Pt states she has upper abdominal pain, states it feels as if she has bubbles of air in her abdominal cavity and can feel them moving around when she moves. Pt stood at bed side, had 10/10 pain under her ribs, pt states she had similar pain with her pregnancies. Pt given PRN percocet for pain and a warm pack for comfort. Pt satisfied with pain control and currently rates pain at 7/10. Will continue to monitor.   Marii Salinas RN on 7/26/2018 at 6:35 PM

## 2018-07-26 NOTE — H&P (VIEW-ONLY)
"Nursing Notes:   Jessica Guerrero LPN  7/3/2018  9:44 AM  Signed  Date of Surgery: 07/26/2018  Type of Surgery: Lap hyst  Surgeon: Dr Ch  Hospital:  [facility]    Fever/Chills or other infectious symptoms in past month: no  >10lb weight loss in past two months: no    Health Care Directive/Code status:  no  Hx of blood transfusions:   no   Td up to date:  yes  History of VRE/MRSA:  no     Preoperative Evaluation: Obstructive Sleep Apnea screening    S:Snore -  Do you snore loudly? (louder than talking or loud enough to be heard through closed doors)no  T: Tired - Do you often feel tired, fatigued, or sleepy during the daytime?yes  O: Observed - Has anyone ever observed you stop breathing during your sleep?no  P: Pressure - Do you have or are you being treated for high blood pressure?no  B: BMI - BMI greater than 35kg/m2?no  A: Age - Age over 50 years old?no  N: Neck - Neck circumference greater than 40 cm?no  G: Gender - Gender: Male?no    Total number of \"YES\" responses:  1    Scoring: Low risk of OZIEL 0-2  At Risk of OZIEL: >3 High Risk of OZIEL: 5-8    Jessica Guerrero LPN..................7/3/2018   9:39 AM          ----------------- PREOPERATIVE EXAM ------------------  7/3/2018    SUBJECTIVE:  Yohana Robertson is a 27 year old female here for preoperative optimization.    Preoperative risk assessment consultation was requested on Yohana Robertson by JOE Ch MD prior to lap hysterectomy.   This is scheduled for July 26, 2018. I am asked to see this patient for preoperative clearance prior to this procedure.     She a former smoker.    Patient Active Problem List    Diagnosis Date Noted     Former smoker 07/03/2018     Priority: Medium     Asthma 02/11/2018     Priority: Medium     Attention deficit disorder 02/11/2018     Priority: Medium     Acute bilateral low back pain with sciatica 07/27/2017     Priority: Medium     Anxiety 01/09/2014     Priority: Medium     Severe episode of recurrent major " depressive disorder (H) 12/26/2013     Priority: Medium     Nonallopathic lesion of lumbar region 08/08/2013     Priority: Medium     IBS (irritable bowel syndrome) 03/28/2013     Priority: Medium     Bunion, left foot 04/11/2012     Priority: Medium     Common migraine 04/20/2011     Priority: Medium       Past Medical History:   Diagnosis Date     Bunion of great toe of left foot     No Comments Provided     Contraceptive management     started on ALYSSIA       Past Surgical History:   Procedure Laterality Date     BUNIONECTOMY      4/2005,Left     COLONOSCOPY      7/2013,Normal     FRACTURE SURGERY      6/13,L Foot-tarsometatarsal realignment arthrodesis with plate and screw fixation, capsule tendon balancing procedures about the first tarsometatarsal joint, [facility]ry Gibson D[facility]     OTHER SURGICAL HISTORY      7/26/2006,205448,REMOVAL OF FOREIGN BODY,Removal of harware L foot after bunionectomy [Other][[[[     TONSILLECTOMY      12/2005       Family History   Problem Relation Age of Onset     HEART DISEASE Mother      Heart Disease,Aortic stenosis and valve replaced     Family History Negative Father      Good Health     Asthma Maternal Grandmother      Asthma     HEART DISEASE Maternal Grandfather      Heart Disease       Social History   Substance Use Topics     Smoking status: Former Smoker     Packs/day: 0.50     Years: 9.00     Types: Cigarettes     Smokeless tobacco: Never Used      Comment: using e-cig     Alcohol use 12.6 oz/week      Comment: couple of brandys a night       Current Outpatient Prescriptions   Medication Sig Dispense Refill     ibuprofen (ADVIL/MOTRIN) 600 MG tablet Take 600 mg by mouth 4 times daily as needed for pain Max dose 3200 mg in 24 hrs         Allergies:  Allergies   Allergen Reactions     Codeine Hives and Shortness Of Breath     Peanuts  [Nuts] Shortness Of Breath       ROS:    Surgical:  patient denies previous complications from prior surgeries including but  "not limited to prolonged bleeding, anesthesia complications, dysrhythmias, surgical wound infections, or prolonged hospital stay.    Denies family hx of bleeding tendencies, anesthesia complications, or other problems with surgery.     -------------------------------------------------------------    PHYSICAL EXAM:  BP 94/62 (BP Location: Right arm, Patient Position: Chair, Cuff Size: Adult Regular)  Pulse 66  Temp 97.5  F (36.4  C) (Tympanic)  Resp 16  Ht 5' 5.25\" (1.657 m)  Wt 156 lb 12.8 oz (71.1 kg)  LMP 07/01/2018  SpO2 99%  Breastfeeding? No  BMI 25.89 kg/m2    EXAM:  General Appearance: Pleasant, alert, appropriate appearance for age. No acute distress  Head Exam: Normal. Normocephalic, atraumatic.  Eyes: PERRL, EOMI  Ears: Normal TM's bilaterally. Normal auditory canals and external ears.   OroPharynx: Normal buccal mucosa. Normal pharynx.  Neck: Supple, no masses or nodes, no lymphadenopathy.  No thyromegaly.  Lungs: Normal chest wall and respirations. Clear to auscultation, no wheezes or crackles.  Cardiovascular: Regular rate and rhythm. S1, S2, no murmurs.  Gastrointestinal: Soft, nontender, no abnormal masses or organomegaly. BS normal   Musculoskeletal: No edema.  Skin: no concerning or new rashes.  Neurologic Exam: CN 2-12 grossly intact.  Normal gait.  normal gross motor movement, tone, and coordination. No tremor.  Psychiatric Exam: Alert and oriented, appropriate affect.      EKG: Not indicated  ---------------------------------------------------------------  LABS  Results for orders placed or performed in visit on 07/03/18   CBC with platelets differential   Result Value Ref Range    WBC 6.2 4.0 - 11.0 10e9/L    RBC Count 4.91 3.8 - 5.2 10e12/L    Hemoglobin 15.4 11.7 - 15.7 g/dL    Hematocrit 46.5 35.0 - 47.0 %    MCV 95 78 - 100 fl    MCH 31.4 26.5 - 33.0 pg    MCHC 33.1 31.5 - 36.5 g/dL    RDW 12.6 10.0 - 15.0 %    Platelet Count 212 150 - 450 10e9/L    Diff Method Automated Method     " % Neutrophils 63.2 %    % Lymphocytes 27.0 %    % Monocytes 7.4 %    % Eosinophils 1.3 %    % Basophils 0.8 %    % Immature Granulocytes 0.3 %    Absolute Neutrophil 3.9 1.6 - 8.3 10e9/L    Absolute Lymphocytes 1.7 0.8 - 5.3 10e9/L    Absolute Monocytes 0.5 0.0 - 1.3 10e9/L    Absolute Eosinophils 0.1 0.0 - 0.7 10e9/L    Absolute Basophils 0.1 0.0 - 0.2 10e9/L    Abs Immature Granulocytes 0.0 0 - 0.4 10e9/L   Comprehensive metabolic panel   Result Value Ref Range    Sodium 138 134 - 144 mmol/L    Potassium 3.9 3.5 - 5.1 mmol/L    Chloride 102 98 - 107 mmol/L    Carbon Dioxide 30 21 - 31 mmol/L    Anion Gap 6 3 - 14 mmol/L    Glucose 89 70 - 105 mg/dL    Urea Nitrogen 13 7 - 25 mg/dL    Creatinine 0.83 0.60 - 1.20 mg/dL    GFR Estimate 82 >60 mL/min/1.7m2    GFR Estimate If Black >90 >60 mL/min/1.7m2    Calcium 9.3 8.6 - 10.3 mg/dL    Bilirubin Total 0.6 0.3 - 1.0 mg/dL    Albumin 4.9 3.5 - 5.7 g/dL    Protein Total 7.2 6.4 - 8.9 g/dL    Alkaline Phosphatase 50 34 - 104 U/L    ALT 10 7 - 52 U/L    AST 15 13 - 39 U/L   Pregnancy Urine (HCG)   Result Value Ref Range    HCG Qual Urine Negative NEG^Negative       ASSESSEMENT AND PLAN:    Yohana was seen today for pre-op exam.    Diagnoses and all orders for this visit:    Preoperative examination  -     CBC with platelets differential  -     Comprehensive metabolic panel  -     Pregnancy Urine (HCG)    Excessive bleeding in premenopausal period    Former smoker    Mild intermittent asthma without complication        PRE OP RECOMMENDATIONS:  Patient is on chronic pain medications NO   Patient is on antiplatlet/anticoagulation medication NO  Other medications that need adjustment perioperatively NO    Other:  Patient was advised to call our office and the surgical services with any change in condition or new symptoms if they were to develop between today and their surgical date.  Especially any cardiopulmonary symptoms or symptoms concerning for an infection.    Oscar BUCK  Marcelo 7/3/2018

## 2018-07-26 NOTE — ANESTHESIA CARE TRANSFER NOTE
Patient: Yohana Robertson    Procedure(s):  Total Laparoscopic Hysterectomy & Bilateral Salpingectomy, Cystoscopy. - Wound Class: II-Clean Contaminated   - Wound Class: II-Clean Contaminated    Diagnosis: heay Menses  Diagnosis Additional Information: No value filed.    Anesthesia Type:   General     Note:  Airway :Face Mask  Patient transferred to:PACU  Handoff Report: Identifed the Patient, Identified the Reponsible Provider, Reviewed the pertinent medical history, Discussed the surgical course, Reviewed Intra-OP anesthesia mangement and issues during anesthesia, Set expectations for post-procedure period and Allowed opportunity for questions and acknowledgement of understanding      Vitals: (Last set prior to Anesthesia Care Transfer)    CRNA VITALS  7/26/2018 1056 - 7/26/2018 1129      7/26/2018             Resp Rate (set): 10                Electronically Signed By: DALIA Carvajal CRNA  July 26, 2018  11:29 AM

## 2018-07-26 NOTE — PROGRESS NOTES
NS ADMISSION NOTE    Patient admitted to room 314 at approximately 1250 via cart from surgery. Patient was accompanied by spouse and mother and children.    Verbal SBAR report received from JIM Corbett prior to patient arrival.     Patient trasferred to bed via air eliza. Patient alert and oriented X 3. Pain is controlled without any medications. 0-10 Pain Scale: 3. Admission vital signs: Blood pressure 95/69, pulse 58, temperature 98.1  F (36.7  C), temperature source Tympanic, resp. rate 14, last menstrual period 07/01/2018, SpO2 99 %, not currently breastfeeding. Patient was oriented to plan of care, call light, bed controls, tv, telephone, bathroom and visiting hours.     Risk Assessment    The following safety risks were identified during admission: fall. Yellow risk band applied: YES.     Skin Initial Assessment    This writer admitted this patient and completed a full skin assessment and Osman score in the Adult PCS flowsheet. Appropriate interventions initiated as needed.       Osman Risk Assessment  Sensory Perception: 3-->slightly limited  Moisture: 4-->rarely moist  Activity: 3-->walks occasionally  Mobility: 3-->slightly limited  Nutrition: 3-->adequate  Friction and Shear: 3-->no apparent problem  Osman Score: 19    Mony Magaña RN on 7/26/2018 at 1250

## 2018-07-26 NOTE — PHARMACY
Pharmacy - Transfer Medication Reconciliation     The patient's transfer medication orders have been compared to the medication administration record and to the Prior to Admissions Medications list - any noted discrepancies were resolved with the MD.     Thank you. Pharmacy will continue to monitor.     Anais Flores [facility] ....................  7/26/2018   2:28 PM

## 2018-07-26 NOTE — OR NURSING
PACU Transfer Note    Yohana Robertson was transferred to [facility] via cart.  Equipment used for transport:  no.  Accompanied by:  CULLEN Sauer RN and JOANNA Hardwick RN    PACU Respiratory Event Documentation     1) Episodes of Apnea greater than or equal to 10 seconds: no    2) Bradypnea - less than 8 breaths per minute: no    3) Pain score on 0 to 10 scale: 4    4) Pain-sedation mismatch (yes or no): no    5) Repeated 02 desaturation less than 90% (yes or no): no    Anesthesia notified? (yes or no): no    Any of the above events occuring repeatedly in separate 30 minute intervals may be considered recurrent PACU respiratory events.    Patient stable and meets phase 1 discharge criteria for transport from PACU.

## 2018-07-26 NOTE — PROGRESS NOTES
Okay per Dr. Ch to indeed have no capnography on patient due to having general anesthetic.   Mony Magaña RN on 7/26/2018 at 3:22 PM

## 2018-07-26 NOTE — IP AVS SNAPSHOT
[facility]    [address]    [place] [address]    Phone:  [phone]    Fax:  [phone]                                       After Visit Summary   7/26/2018    Yohana Robertson    MRN: [id number]           After Visit Summary Signature Page     I have received my discharge instructions, and my questions have been answered. I have discussed any challenges I see with this plan with the nurse or doctor.    ..........................................................................................................................................  Patient/Patient Representative Signature      ..........................................................................................................................................  Patient Representative Print Name and Relationship to Patient    ..................................................               ................................................  Date                                            Time    ..........................................................................................................................................  Reviewed by Signature/Title    ...................................................              ..............................................  Date                                                            Time

## 2018-07-26 NOTE — OP NOTE
Gynecologic Operative Note   Yohana Robertson  [id number]  7/26/2018    Preoperative Diagnosis:   Menorrhagia  Dysmenorrhea     Postoperative Diagnosis:   Same plus endometriosis     Procedure:   Total laparoscopic hysterectomy with bilateral salpingectomy, cystoscopy    Surgeon: Renate Ch MD      Assist: Curtis Stephens MD    Anesthesia: general endotracheal     Complications: none apparent     EBL: 150 mL   Specimens: uterus, cervix, bilateral fallopian tubes    Findings: Exam under anesthesia revealed mobile, anteverted, normal sized uterus.  Upon entry of the abdomen with the laparoscope, normal appendix, uterus, bilateral fallopian tubes and ovaries. Scarring and crypts in the posterior cul de sac with few active lesions.  A survey of the upper abdomen showed normal anatomy.     Indications: Ms. Robertson is a 27 year old female who presented with complaint of painful and heavy menses that failed medical management. She desired hysterectomy. Discussed risks and benefits of ovarian preservation if endometriosis was identified, and she desired ovarian conservation regardless of findings. All risks, benefits and alternatives were discussed and written informed consent was obtained.     Procedure: The patient was taken to the operating room where general anesthesia was induced without difficulty. She was then examined under anesthesia with the above noted findings. She was then prepared and draped in the normal sterile fashion in the dorsal lithotomy position using yellow fin stirrups. Attention was turned to the vagina. A bivalve speculum was placed for visualization of the cervix and the anterior lip of the cervix was then grasped with a single-tooth tenaculum. The cervix was then serially dilated and the V-care manipulator easily placed. A vaginal occluder balloon was placed in the vagina. A finch catheter was placed in the bladder.    Attention was then turned to the abdomen where a 5mm infra-umbilical  skin incision was made. The veres needle was then introduced into the peritoneal cavity while tenting the abdominal wall. The abdomen was insufflated with opening pressure of 2mmHg. The trocar and sleeve were then advanced without difficulty into the abdomen where intra-abdominal placement was confirmed with the laparoscope. A second 5mm skin incision was then made in the RLQ and the trocar and sleeve advanced under direct visualization. A third skin incision, 11 mm, was made in the LLQ and the trocar and sleeve advanced under direct visualization.     A survey of the patient's abdomen and pelvis was made with the above noted findings. Attention was then turned to the pelvis. The left fallopian tube was grasped, elevated, and the mesosalpinx divided from the fimbriated end toward the cornua with the Ligasure device. The left uteroovarian ligament and round ligament were then serially divided with the ligasure device. The broad ligament was divided with the ligasure to the level of the uterus artery. From this side, the anterior leaf of the broad ligament was incised along the bladder reflection to the midline. The same procedure was then completed on the right. A bladder flap was mobilized and the peritoneum on the vesicouterine fold was incised to mobilize the bladder. The uterine arteries were then transected and ligated using the Ligasure device, down to the level of the colpotomy ring. The vagina was transected along the V-care cup using the Ligasure, resulting in separation of the uterus and attached tubes. The uterus and tubes were then delivered through the vagina, and the pneumo-occluder balloon was reinserted to maintain pneumoperitoneum. The vaginal vault was closed with running 0-VLock suture using the endostitch device. The abdomen was irrigated, and excellent hemostasis was noted. The 11mm port was closed using the Myke Pau. Then, the abdomen was the desufflated and all trocars were then removed.  The skin incisions were then closed using 3-0 monocryl in a subcuticular fashion.    CYSTO: Indigo carmine was given to the patient by the anesthesia staff. A cystoscopy was performed that showed bilateral ureteral jets, confirming ureteral patency.  All instruments were removed from the vagina and the finch catheter was replaced.     The patient tolerated the procedure well.  Sponge, lap and needle counts were correct. The patient was taken to the recovery area in stable condition.    First Assist: Dr Stephens was requested to be present for visualization and hemostasis with suspected endometriosis.     Renate Ch MD 11:33 AM 7/26/2018

## 2018-07-26 NOTE — ANESTHESIA POSTPROCEDURE EVALUATION
Patient: Yohana Robertson    Procedure(s):  Total Laparoscopic Hysterectomy & Bilateral Salpingectomy, Cystoscopy. - Wound Class: II-Clean Contaminated   - Wound Class: II-Clean Contaminated    Diagnosis:heay Menses  Diagnosis Additional Information: No value filed.    Anesthesia Type:  General    Note:  Anesthesia Post Evaluation    Patient location during evaluation: PACU  Patient participation: Able to fully participate in evaluation  Level of consciousness: awake and alert  Pain management: adequate  Airway patency: patent  Cardiovascular status: blood pressure returned to baseline and hemodynamically stable  Respiratory status: acceptable  Hydration status: acceptable  PONV: none     Anesthetic complications: None          Last vitals:  Vitals:    07/26/18 1135 07/26/18 1140 07/26/18 1145   BP: 114/72 95/57 97/59   Pulse:      Resp: 14 16 16   Temp:   97.9  F (36.6  C)   SpO2:  98% 97%         Electronically Signed By: DALIA Giraldo CRNA  July 26, 2018  11:53 AM

## 2018-07-26 NOTE — INTERVAL H&P NOTE
History and Physical Update    The history and physical has been reviewed and the patient has been examined.  There are no interim changes to the patient's history or physical condition.    Renate Ch MD

## 2018-07-27 VITALS
TEMPERATURE: 97.7 F | BODY MASS INDEX: 25.52 KG/M2 | OXYGEN SATURATION: 98 % | DIASTOLIC BLOOD PRESSURE: 83 MMHG | SYSTOLIC BLOOD PRESSURE: 129 MMHG | WEIGHT: 154.54 LBS | RESPIRATION RATE: 20 BRPM | HEART RATE: 58 BPM

## 2018-07-27 LAB — HGB BLD-MCNC: 11.7 G/DL (ref 11.7–15.7)

## 2018-07-27 PROCEDURE — 85018 HEMOGLOBIN: CPT | Performed by: OBSTETRICS & GYNECOLOGY

## 2018-07-27 PROCEDURE — 99024 POSTOP FOLLOW-UP VISIT: CPT | Performed by: OBSTETRICS & GYNECOLOGY

## 2018-07-27 PROCEDURE — 94640 AIRWAY INHALATION TREATMENT: CPT

## 2018-07-27 PROCEDURE — 36415 COLL VENOUS BLD VENIPUNCTURE: CPT | Performed by: OBSTETRICS & GYNECOLOGY

## 2018-07-27 PROCEDURE — 25000132 ZZH RX MED GY IP 250 OP 250 PS 637: Performed by: OBSTETRICS & GYNECOLOGY

## 2018-07-27 PROCEDURE — 25000125 ZZHC RX 250: Performed by: OBSTETRICS & GYNECOLOGY

## 2018-07-27 PROCEDURE — 40000275 ZZH STATISTIC RCP TIME EA 10 MIN

## 2018-07-27 RX ORDER — HYDROCODONE BITARTRATE AND ACETAMINOPHEN 5; 325 MG/1; MG/1
1-2 TABLET ORAL EVERY 4 HOURS PRN
Qty: 20 TABLET | Refills: 0 | Status: SHIPPED | OUTPATIENT
Start: 2018-07-27 | End: 2018-08-10

## 2018-07-27 RX ORDER — ALBUTEROL SULFATE 0.83 MG/ML
2.5 SOLUTION RESPIRATORY (INHALATION) EVERY 6 HOURS PRN
Status: DISCONTINUED | OUTPATIENT
Start: 2018-07-27 | End: 2018-07-27 | Stop reason: HOSPADM

## 2018-07-27 RX ORDER — ALBUTEROL SULFATE 90 UG/1
2 AEROSOL, METERED RESPIRATORY (INHALATION) EVERY 6 HOURS PRN
Status: DISCONTINUED | OUTPATIENT
Start: 2018-07-27 | End: 2018-07-27 | Stop reason: CLARIF

## 2018-07-27 RX ADMIN — IBUPROFEN 600 MG: 600 TABLET ORAL at 08:26

## 2018-07-27 RX ADMIN — HYDROCODONE BITARTRATE AND ACETAMINOPHEN 1 TABLET: 5; 325 TABLET ORAL at 13:50

## 2018-07-27 RX ADMIN — HYDROCODONE BITARTRATE AND ACETAMINOPHEN 1 TABLET: 5; 325 TABLET ORAL at 08:26

## 2018-07-27 RX ADMIN — ALBUTEROL SULFATE 2.5 MG: 2.5 SOLUTION RESPIRATORY (INHALATION) at 08:29

## 2018-07-27 RX ADMIN — HYDROCODONE BITARTRATE AND ACETAMINOPHEN 1 TABLET: 5; 325 TABLET ORAL at 09:40

## 2018-07-27 RX ADMIN — HYDROCODONE BITARTRATE AND ACETAMINOPHEN 1 TABLET: 5; 325 TABLET ORAL at 03:18

## 2018-07-27 NOTE — PROGRESS NOTES
Gyn Progress Note    S: Patient is in more pain this morning. She hasn't had any pain medications for 6 hours and has only received ibuprofen once since surgery. The pain is worse under her ribs. She has voided once since finch removal, felt like she was able to empty completely. Tolerating regular diet. Feels like her asthma is acting up because she is in pain.     O:   Vitals:    07/26/18 2010 07/26/18 2328 07/27/18 0300 07/27/18 0303   BP: 109/63 98/59  96/48   BP Location: Right arm Right arm  Right arm   Pulse:       Resp: 18 16 14   Temp: 98.4  F (36.9  C) 97.9  F (36.6  C)  98.6  F (37  C)   TempSrc: Tympanic Tympanic  Tympanic   SpO2: 98% 99%  98%   Weight:   70.1 kg (154 lb 8.7 oz)      Gen: resting in bed, appears uncomfortable  Resp: slightly increased work of breathing, audible wheezing  Abd: soft, nondistended, nontender in lower abdomen, mild tenderness in upper abdomen. Incisions covered in dressings with minimal shadowing  Ext: nontender, no edema    Hemoglobin   Date Value Ref Range Status   07/27/2018 11.7 11.7 - 15.7 g/dL Final     A/P: 27 year old POD#1 s/p CHRISSY, BS, cysto  - Albuterol inhaler for wheezing  - Percocet and ibuprofen for pain- encourage patient to take both  - encourage ambulation  - Regular diet as tolerated  - anticipate dc later today    Renate Ch MD  OB/GYN  7/27/2018 8:20 AM

## 2018-07-27 NOTE — PROGRESS NOTES
[facility] DISCHARGE NOTE    Patient discharged to home at 2:20 PM via wheel chair. Accompanied by spouse and staff. Discharge instructions reviewed with patient and spouse, opportunity offered to ask questions. Prescriptions sent to patients preferred pharmacy. All belongings sent with patient.    Elayne Flores

## 2018-07-27 NOTE — PLAN OF CARE
Problem: Pain, Acute (Adult)  Goal: Acceptable Pain Control/Comfort Level  Patient will demonstrate the desired outcomes by discharge/transition of care.   Outcome: No Change  Pt was encouraged to take pain medications when needed and not wait too long.  She agreed she feels it is better to stay on top of pain.

## 2018-07-27 NOTE — DISCHARGE SUMMARY
[facility] Discharge Summary    Yohana Robertson MRN# [id number]   Age: 27 year old YOB: 1990     Date of Admission:  7/26/2018  Date of Discharge::  7/27/2018   Admitting Physician:  Renate Ch MD   Discharge Physician:  Renate Ch MD             Admission Diagnoses:   Dysmenorrhea  menorrhagia          Discharge Diagnosis:   Same plus endometriosis          Procedures:   Procedure(s): Total laparoscopic hysterectomy, bilateral salpingectomy, cystoscopy                Medications Prior to Admission:     Prescriptions Prior to Admission   Medication Sig Dispense Refill Last Dose     ibuprofen (ADVIL/MOTRIN) 600 MG tablet Take 600 mg by mouth 4 times daily as needed for pain Max dose 3200 mg in 24 hrs   7/19/2018             Discharge Medications:     Current Discharge Medication List      START taking these medications    Details   HYDROcodone-acetaminophen (NORCO) 5-325 MG per tablet Take 1-2 tablets by mouth every 4 hours as needed for other (pain control or improvement in physical function. Hold dose for analgesic side effects.)  Qty: 20 tablet, Refills: 0    Associated Diagnoses: S/P laparoscopic hysterectomy         CONTINUE these medications which have NOT CHANGED    Details   ibuprofen (ADVIL/MOTRIN) 600 MG tablet Take 600 mg by mouth 4 times daily as needed for pain Max dose 3200 mg in 24 hrs                   Consultations:   No consultations were requested during this admission          Brief History of Illness:     Ms. Robertson is a 27 year old female who presented with complaint of painful and heavy menses that failed medical management. She desired hysterectomy. Discussed risks and benefits of ovarian preservation if endometriosis was identified, and she desired ovarian conservation regardless of findings.           Hospital Course:   The patient's hospital course was unremarkable.  She recovered as anticipated and experienced no post-operative complications. On day  of discharge, she was tolerating a regular diet, voiding spontaneously and pain was controlled on oral medications. She was deemed stable for discharge on POD#1.          Discharge Instructions and Follow-Up:   Discharge diet: Regular   Discharge activity: Lifting restricted to 15 pounds  No heavy lifting, pushing, pulling for 6 week(s)  Pelvic rest: abstain from intercourse and do not use tampons for 6 week(s)   Discharge follow-up: Follow up with Dr Ch in 2 and 6 weeks   Wound care Drink plenty of fluids  Ice to area for comfort  Keep wound clean and dry  Steri-strips off in 7 days           Discharge Disposition:   Discharged to home      Renate Ch MD

## 2018-07-27 NOTE — PHARMACY - DISCHARGE MEDICATION RECONCILIATION AND EDUCATION
Pharmacy: Discharge Counseling and Medication Reconciliation    Yohana Robertson  [address] [place] [address] [address] [address]  [phone] (home)   27 year old female  PCP:Oscar Robertson    [unfilled]    Discharge Counseling:    Pharmacist met with patient (and/or family) today to review the medication portion of the After Visit Summary (with an emphasis on NEW medications) and to address patient's questions/concerns.     Summary of Education:   Met with patient at time of discharge to review all changes in medications including new hydrocodone/acetaminophen. Discussed indications, directions for use, and possible side effects of each medication. Patient asked a few questions and all concerns were addressed.     Materials Provided:   MedCounselor sheets printed from Clinical Pharmacology on: hydrocodone/acetaminophen     Discharge Medication Reconciliation:    Anais Flores has reviewed the patient's discharge medication orders and has compared them to the inpatient medication administration record and to what the patient was taking prior to admission- any discrepancies have been resolved.     It has been determined that the patient has an adequate supply of medications available or which can be obtained from the patient's preferred pharmacy, which patient has confirmed as: a hard copy that has been delivered to the [facility].     Thank you for the consult.     Anais Flores ....................  7/27/2018   10:12 AM

## 2018-08-09 ENCOUNTER — MYC MEDICAL ADVICE (OUTPATIENT)
Dept: OBGYN | Facility: OTHER | Age: 28
End: 2018-08-09

## 2018-08-09 ENCOUNTER — NURSE TRIAGE (OUTPATIENT)
Dept: OBGYN | Facility: OTHER | Age: 28
End: 2018-08-09

## 2018-08-09 ENCOUNTER — TELEPHONE (OUTPATIENT)
Dept: OBGYN | Facility: OTHER | Age: 28
End: 2018-08-09

## 2018-08-09 ENCOUNTER — HOSPITAL ENCOUNTER (EMERGENCY)
Facility: OTHER | Age: 28
Discharge: HOME | End: 2018-08-09
Attending: FAMILY MEDICINE | Admitting: FAMILY MEDICINE
Payer: MEDICAID

## 2018-08-09 VITALS
DIASTOLIC BLOOD PRESSURE: 68 MMHG | BODY MASS INDEX: 24.66 KG/M2 | SYSTOLIC BLOOD PRESSURE: 100 MMHG | OXYGEN SATURATION: 99 % | RESPIRATION RATE: 16 BRPM | HEIGHT: 65 IN | TEMPERATURE: 98.3 F | WEIGHT: 148 LBS

## 2018-08-09 DIAGNOSIS — Z90.710 S/P LAPAROSCOPIC HYSTERECTOMY: ICD-10-CM

## 2018-08-09 DIAGNOSIS — N99.820 POSTOPERATIVE VAGINAL BLEEDING FOLLOWING GENITOURINARY PROCEDURE: ICD-10-CM

## 2018-08-09 LAB
ABO + RH BLD: NORMAL
ABO + RH BLD: NORMAL
ANION GAP SERPL CALCULATED.3IONS-SCNC: 8 MMOL/L (ref 3–14)
BASOPHILS # BLD AUTO: 0.1 10E9/L (ref 0–0.2)
BASOPHILS NFR BLD AUTO: 0.9 %
BLD GP AB SCN SERPL QL: NORMAL
BLOOD BANK CMNT PATIENT-IMP: NORMAL
BUN SERPL-MCNC: 9 MG/DL (ref 7–25)
CALCIUM SERPL-MCNC: 9.7 MG/DL (ref 8.6–10.3)
CHLORIDE SERPL-SCNC: 104 MMOL/L (ref 98–107)
CO2 SERPL-SCNC: 27 MMOL/L (ref 21–31)
CREAT SERPL-MCNC: 0.78 MG/DL (ref 0.6–1.2)
DIFFERENTIAL METHOD BLD: ABNORMAL
EOSINOPHIL # BLD AUTO: 0.2 10E9/L (ref 0–0.7)
EOSINOPHIL NFR BLD AUTO: 1.3 %
ERYTHROCYTE [DISTWIDTH] IN BLOOD BY AUTOMATED COUNT: 12.4 % (ref 10–15)
GFR SERPL CREATININE-BSD FRML MDRD: 88 ML/MIN/1.7M2
GLUCOSE SERPL-MCNC: 83 MG/DL (ref 70–105)
HCT VFR BLD AUTO: 44.7 % (ref 35–47)
HGB BLD-MCNC: 15.3 G/DL (ref 11.7–15.7)
IMM GRANULOCYTES # BLD: 0.1 10E9/L (ref 0–0.4)
IMM GRANULOCYTES NFR BLD: 0.5 %
LYMPHOCYTES # BLD AUTO: 1.8 10E9/L (ref 0.8–5.3)
LYMPHOCYTES NFR BLD AUTO: 14.3 %
MCH RBC QN AUTO: 31.4 PG (ref 26.5–33)
MCHC RBC AUTO-ENTMCNC: 34.2 G/DL (ref 31.5–36.5)
MCV RBC AUTO: 92 FL (ref 78–100)
MONOCYTES # BLD AUTO: 0.6 10E9/L (ref 0–1.3)
MONOCYTES NFR BLD AUTO: 4.9 %
NEUTROPHILS # BLD AUTO: 10 10E9/L (ref 1.6–8.3)
NEUTROPHILS NFR BLD AUTO: 78.1 %
PLATELET # BLD AUTO: 295 10E9/L (ref 150–450)
POTASSIUM SERPL-SCNC: 3.6 MMOL/L (ref 3.5–5.1)
RBC # BLD AUTO: 4.87 10E12/L (ref 3.8–5.2)
SODIUM SERPL-SCNC: 139 MMOL/L (ref 134–144)
SPECIMEN EXP DATE BLD: NORMAL
WBC # BLD AUTO: 12.8 10E9/L (ref 4–11)

## 2018-08-09 PROCEDURE — 86900 BLOOD TYPING SEROLOGIC ABO: CPT | Performed by: FAMILY MEDICINE

## 2018-08-09 PROCEDURE — 99283 EMERGENCY DEPT VISIT LOW MDM: CPT | Performed by: FAMILY MEDICINE

## 2018-08-09 PROCEDURE — 86901 BLOOD TYPING SEROLOGIC RH(D): CPT | Performed by: FAMILY MEDICINE

## 2018-08-09 PROCEDURE — 86850 RBC ANTIBODY SCREEN: CPT | Performed by: FAMILY MEDICINE

## 2018-08-09 PROCEDURE — 80048 BASIC METABOLIC PNL TOTAL CA: CPT | Performed by: FAMILY MEDICINE

## 2018-08-09 PROCEDURE — 85025 COMPLETE CBC W/AUTO DIFF WBC: CPT | Performed by: FAMILY MEDICINE

## 2018-08-09 PROCEDURE — 99283 EMERGENCY DEPT VISIT LOW MDM: CPT | Mod: Z6 | Performed by: FAMILY MEDICINE

## 2018-08-09 PROCEDURE — 36415 COLL VENOUS BLD VENIPUNCTURE: CPT | Performed by: FAMILY MEDICINE

## 2018-08-09 ASSESSMENT — ENCOUNTER SYMPTOMS
FEVER: 0
CHILLS: 0
VOMITING: 0
DIARRHEA: 0
MUSCULOSKELETAL NEGATIVE: 1
BLOOD IN STOOL: 0
DIFFICULTY URINATING: 0
DYSURIA: 0
EYES NEGATIVE: 1
SHORTNESS OF BREATH: 0
CONSTIPATION: 0
ABDOMINAL PAIN: 1
DIAPHORESIS: 0
NAUSEA: 0
HEMATURIA: 0
CONSTITUTIONAL NEGATIVE: 1
CARDIOVASCULAR NEGATIVE: 1
COUGH: 0

## 2018-08-09 NOTE — ED AVS SNAPSHOT
[facility]    [address] [address]    Phone:  [phone]    Fax:  [phone]                                       Yohana Robertson   MRN: [id number]    Department:  [facility]   Date of Visit:  8/9/2018           Patient Information     Date Of Birth          1990        Your diagnoses for this visit were:     S/P laparoscopic hysterectomy     Postoperative vaginal bleeding following genitourinary procedure        You were seen by Joey Gurrola MD.      Follow-up Information     Go to Curtis Stephens MD.    Specialty:  OB/Gyn    Why:  As scheduled and , As needed, If symptoms worsen    Contact information:    [address] [place]  [facility] [address] [phone]          Discharge Instructions       Dear Ms. Robertson,   It was nice to meet you.  As we talked, your hemoglobin is very reassuring and Dr. Ch is reassured by your exam.      It may be that your are a little dehydrated and I recommend drinking 6-8 glasses of water per day.    Follow up as scheduled and needed.  Dr. Jose Gurrola        Discharge References/Attachments     HYSTERECTOMY (LAPAROSCOPIC), DISCHARGE INSTRUCTIONS (ENGLISH)      Your next 10 appointments already scheduled     Aug 10, 2018 11:00 AM CDT   SHORT with Renate Ch MD   [facility]    [address] [id number] [phone]            Sep 07, 2018 11:15 AM CDT   Office Visit with Renate Ch MD   [facility]    [address] [place] [address] [phone]           Bring a current list of meds and any records pertaining to this visit. For Physicals, please bring immunization records and any forms needing to be filled out. Please arrive 10 minutes early to complete paperwork.              24 Hour Appointment Hotline     To  schedule an appointment at [facility], please call [phone]. If you don't have a family doctor or clinic, we will help you find one. [facility] clinics are conveniently located to serve the needs of you and your family.           Review of your medicines      Our records show that you are taking the medicines listed below. If these are incorrect, please call your family doctor or clinic.        Dose / Directions Last dose taken    HYDROcodone-acetaminophen 5-325 MG per tablet   Commonly known as:  NORCO   Dose:  1-2 tablet   Quantity:  20 tablet        Take 1-2 tablets by mouth every 4 hours as needed for other (pain control or improvement in physical function. Hold dose for analgesic side effects.)   Refills:  0        ibuprofen 600 MG tablet   Commonly known as:  ADVIL/MOTRIN   Dose:  600 mg        Take 600 mg by mouth 4 times daily as needed for pain Max dose 3200 mg in 24 hrs   Refills:  0                Procedures and tests performed during your visit     ABO/Rh type and screen    Basic metabolic panel    CBC with platelets differential      Orders Needing Specimen Collection     None      Pending Results     No orders found from 8/7/2018 to 8/10/2018.            Pending Culture Results     No orders found from 8/7/2018 to 8/10/2018.            Pending Results Instructions     If you had any lab results that were not finalized at the time of your Discharge, you can call the ED Lab Result RN at [phone]. You will be contacted by this team for any positive Lab results or changes in treatment. The nurses are available 7 days a week from 10A to 6:30P.  You can leave a message 24 hours per day and they will return your call.        Thank you for choosing [facility]       Thank you for choosing [facility] for your care. Our goal is always to provide you with excellent care. Hearing back from our patients is one way we can continue to improve our services. Please take a few minutes to complete the written survey  that you may receive in the mail after you visit with us. Thank you!        [employer] Information     [employer] gives you secure access to your electronic health record. If you see a primary care provider, you can also send messages to your care team and make appointments. If you have questions, please call your primary care clinic.  If you do not have a primary care provider, please call [phone] and they will assist you.        Care EveryWhere ID     This is your Care EveryWhere ID. This could be used by other organizations to access your [facility] medical records  [id number]        Equal Access to Services     EARLKaiser Medical CenterJUANA : aSbine Perez, efren chaudhry, milana conrad, milly hull . So [facility] [phone].    ATENCIÓN: Si habla español, tiene a magallanes disposición servicios gratuitos de asistencia lingüística. Marek al [phone].    We comply with applicable federal civil rights laws and [place] laws. We do not discriminate on the basis of race, color, national origin, age, disability, sex, sexual orientation, or gender identity.            After Visit Summary       This is your record. Keep this with you and show to your community pharmacist(s) and doctor(s) at your next visit.

## 2018-08-09 NOTE — TELEPHONE ENCOUNTER
"Pt stated in her MyChart message she is \"now bleeding\".  This RN attempted to contact pt via telephone with no answer.  Pt has a scheduled appt with Dr. JENELLE Ch at [phone].  Kelly Adames RN on 8/9/2018 at 2:33 PM    "

## 2018-08-09 NOTE — TELEPHONE ENCOUNTER
"Patient reported to Unit #4 check-in Window. \"Nurse assist\" was called. Patient stated she was told to come in for an appointment, but started bleeding soon after. Per Pt, she had a vaginal hysterectomy, 2 weeks ago. Pt states she had blood clots today and had a fever yesterday. Nurse Nicole helped Pt into w/c while Nurse Brittany got Patient information from Unit #5 (OBGYN) RN. OB Nurse states she spoke with Pt earlier, advising her to be seen by Dr. JENELLE Ch at 1515. At 1433, Pt sent [employer] message that she was \"now bleeding.\" Per OB RN, Pt advised via JAZ[facility], after failed phone attempt, to come to ED immediately, with adult .     At time of \"Nurse assist\", Pt appeared clammy, shaky, and c/o weakness. Pt transferred via w/c to ED and hand-off report given to ED nurse. Kelly Eden RN .............. 8/9/2018  3:08 PM    "

## 2018-08-09 NOTE — DISCHARGE INSTRUCTIONS
Dear Ms. Marcelo,   It was nice to meet you.  As we talked, your hemoglobin is very reassuring and Dr. Ch is reassured by your exam.      It may be that your are a little dehydrated and I recommend drinking 6-8 glasses of water per day.    Follow up as scheduled and needed.  Dr. Jose Gurrola

## 2018-08-09 NOTE — ED TRIAGE NOTES
Pt reports she had a hysterectomy 2 weeks ago (F/U appt is tomorrow).  She's been having vaginal discharge since Sunday (white in color) and it smells bad.  Pt reports she felt lightheaded on Sunday and she felt a sharp pain in her vagina, had a little spotting too.  Today she's been having vaginal bleeding, a fever earlier today, incisional pain (LLQ), H/A, feeling lightheaded & tired.  She tried to be seen in the clinic and was brought to the ER.  She had talked to a nurse (Brittany) and she told her to be checked out.  After pt spoke to the nurse, her vaginal bleeding started.  She's been passing clots.

## 2018-08-09 NOTE — TELEPHONE ENCOUNTER
"This RN returned pt's telephone call.  Pt is 2 weeks status post TLH with BS.  Reports fever with temp of 100.4, foul smelling white vaginal discharge, abdominal pain for LLQ area, \"tiny bumps\" on lower abdomen, sneezing, non-productive cough, and diaphoresis.  Pt advised and instructed on importance of seeking medical attention, and having someone drive her.  Given 1515 appt time with Dr. JENELLE Ch.  States she can make it to this appt time and plans on coming.  Kelly Adames RN on 8/9/2018 at 2:06 PM     Additional Information    Negative: Shock suspected (e.g., cold/pale/clammy skin, too weak to stand, low BP, rapid pulse)    Negative: Difficult to awaken or acting confused  (e.g., disoriented, slurred speech)    Negative: [1] Difficulty breathing AND [2] bluish lips, tongue or face    Negative: New onset rash with multiple purple (or blood-colored) spots or dots    Negative: Sounds like a life-threatening emergency to the triager    Negative: Fever in a cancer patient who is currently is receiving chemotherapy or radiation therapy, or cancer patient who has metastatic or end-stage cancer and is receiving palliative care    Negative: Pregnant    Negative: Fever onset within 24 hours of receiving vaccine    Negative: [1] Fever AND [2] within 21 days of Ebola EXPOSURE    Negative: Other symptom is present, see that guideline  (e.g., symptoms of cough, runny nose, sore throat, earache, abdominal pain, diarrhea, vomiting)    Negative: [1] Headache AND [2] stiff neck (can't touch chin to chest)    Negative: Difficulty breathing    Negative: IV drug abuse    Negative: [1] Drinking very little AND [2] dehydration suspected (e.g., no urine > 12 hours, very dry mouth, very lightheaded)    Negative: Patient sounds very sick or weak to the triager  (Exception: mild weakness and hasn't taken fever medicine)    Negative: Fever > 104 F (40 C)    Negative: [1] Fever > 101 F (38.3 C) AND [2] age > 60    Negative: [1] Fever " "> 101 F (38.3 C) AND [2] bedridden (e.g., nursing home patient, CVA, chronic illness, recovering from surgery)    Negative: [1] Fever > 100.5 F (38.1 C) AND [2] indwelling urinary catheter (e.g., [place], [place])    Negative: [1] Fever > 100.5 F (38.1 C) AND [2] has port (portacath), central line, or PICC line    Negative: [1] Fever > 100.5 F (38.1 C) AND [2] diabetes mellitus or weak immune system (e.g., HIV positive, splenectomy, chronic steroids)    Negative: [1] Fever > 100.5 F (38.1 C) AND [2] surgery in the last month    Negative: Transplant patient (e.g., kidney, liver, lung, heart)    Negative: [1] Intermittent fever > 100.5 F (38.1 C) AND [2] lasts > 3 weeks    Negative: Fever present > 3 days (72 hours)    Negative: [1] Fever > 100.5 F (38.1 C) AND [2] foreign travel to a developing country in the last month    Answer Assessment - Initial Assessment Questions  1. TEMPERATURE: \"What is the most recent temperature?\"  \"How was it measured?\"       100.4 (oral) at 1250, 99.0 (oral) at 1150  2. ONSET: \"When did the fever start?\"       One hour prior.  3. SYMPTOMS: \"Do you have any other symptoms besides the fever?\"  (e.g., colds, headache, sore throat, earache, cough, rash, diarrhea, vomiting, abdominal pain)      Reports \"tiny bumps\" on lower abd, non-productive cough, nausea, foul smelling white vaginal discharge.  4. CAUSE: If there are no symptoms, ask: \"What do you think is causing the fever?\"       Reports she believes she may have an infection.  5. CONTACTS: \"Does anyone else in the family have an infection?\"      Denies.  6. TREATMENT: \"What have you done so far to treat this fever?\" (e.g., medications)      Nothing.  7. IMMUNOCOMPROMISE: \"Do you have of the following: diabetes, HIV positive, splenectomy, cancer chemotherapy, chronic steroid treatment, transplant patient, etc.\"      Denies.  8. PREGNANCY: \"Is there any chance you are pregnant?\" \"When was your last menstrual period?\"      No.  9. TRAVEL: " "\"Have you traveled out of the country in the last month?\" (e.g., travel history, exposures)      Denies.    Protocols used: FEVER-ADULT-AH    "

## 2018-08-09 NOTE — TELEPHONE ENCOUNTER
Pt went to the ED today with vaginal bleeding 2 weeks out from a [facility]  See ED report.  I stopped by and talked to the patient  Vitals were stable.  Abd soft  Hgb 15  No active blood on perineum  Agreed with discharge home to rest  Has f/u visit with ARIELLE tomorrow  Discussed with Dr. Gurrola in ED

## 2018-08-09 NOTE — ED PROVIDER NOTES
History     Chief Complaint   Patient presents with     Post-op Problem     HPI  Yohana Robertson is a 27 year old female who is 2 weeks s/p abdominal hysterectomy and felt more cramping today and had some period type vaginal bleeding of a mix of old and new thin blood.  She had some spotting on her underwear and then large amount with attempt to void today.  No f/c/s.  She was directed by GYN to come to clinic and felt more bleeding and was directed to the ED first for blood work.      Problem List:    Patient Active Problem List    Diagnosis Date Noted     S/P laparoscopic hysterectomy 07/26/2018     Priority: Medium     Former smoker 07/03/2018     Priority: Medium     Asthma 02/11/2018     Priority: Medium     Attention deficit disorder 02/11/2018     Priority: Medium     Acute bilateral low back pain with sciatica 07/27/2017     Priority: Medium     Anxiety 01/09/2014     Priority: Medium     Severe episode of recurrent major depressive disorder (H) 12/26/2013     Priority: Medium     Nonallopathic lesion of lumbar region 08/08/2013     Priority: Medium     IBS (irritable bowel syndrome) 03/28/2013     Priority: Medium     Bunion, left foot 04/11/2012     Priority: Medium     Common migraine 04/20/2011     Priority: Medium        Past Medical History:    Past Medical History:   Diagnosis Date     Bunion of great toe of left foot      Contraceptive management        Past Surgical History:    Past Surgical History:   Procedure Laterality Date     BUNIONECTOMY      4/2005,Left     COLONOSCOPY      7/2013,Normal     CYSTOSCOPY N/A 7/26/2018    Procedure: CYSTOSCOPY;;  Surgeon: Renate Ch MD;  Location:  OR     DENTAL SURGERY      wisdom teeth     FRACTURE SURGERY      6/13,L Foot-tarsometatarsal realignment arthrodesis with plate and screw fixation, capsule tendon balancing procedures about the first tarsometatarsal joint, Landry Gibson DPM  [facility]     hardware removal Left      "7/26/2006,430722,REMOVAL OF FOREIGN BODY,Removal of harware L foot after bunionectomy [Other][[[[     LAPAROSCOPIC HYSTERECTOMY TOTAL, SALPINGECTOMY N/A 7/26/2018    Procedure: LAPAROSCOPIC HYSTERECTOMY TOTAL, SALPINGECTOMY;  Total Laparoscopic Hysterectomy & Bilateral Salpingectomy, Cystoscopy.;  Surgeon: Renate Ch MD;  Location: [facility] OR     TONSILLECTOMY      12/2005       Family History:    Family History   Problem Relation Age of Onset     HEART DISEASE Mother      Heart Disease,Aortic stenosis and valve replaced     Family History Negative Father      Good Health     Asthma Maternal Grandmother      Asthma     HEART DISEASE Maternal Grandfather      Heart Disease       Social History:  Marital Status:  Single [1]  Social History   Substance Use Topics     Smoking status: Current Every Day Smoker     Years: 9.00     Types: Cigarettes     Smokeless tobacco: Never Used      Comment: using e-cig     Alcohol use 12.6 oz/week      Comment: couple of brandys a night        Medications:      HYDROcodone-acetaminophen (NORCO) 5-325 MG per tablet   ibuprofen (ADVIL/MOTRIN) 600 MG tablet         Review of Systems   Constitutional: Negative.  Negative for chills, diaphoresis and fever.   HENT: Negative.    Eyes: Negative.    Respiratory: Negative for cough and shortness of breath.    Cardiovascular: Negative.    Gastrointestinal: Positive for abdominal pain. Negative for blood in stool, constipation, diarrhea, nausea and vomiting.        Normal BMs each day for the past 10 days.   Genitourinary: Positive for vaginal bleeding. Negative for difficulty urinating, dysuria, hematuria and vaginal discharge.   Musculoskeletal: Negative.    Skin: Negative.        Physical Exam   BP: (!) 137/96  Heart Rate: 76  Temp: 98.3  F (36.8  C)  Resp: 16  Height: 165.1 cm (5' 5\")  Weight: 67.1 kg (148 lb)  SpO2: 100 %      Physical Exam   Constitutional: She appears well-developed and well-nourished. No distress.   Appropriately " concerned 27 year old female.  NAD.   HENT:   Head: Normocephalic and atraumatic.   Right Ear: External ear normal.   Left Ear: External ear normal.   Nose: Nose normal.   Mouth/Throat: Oropharynx is clear and moist.   Eyes: Conjunctivae and EOM are normal. Pupils are equal, round, and reactive to light. No scleral icterus.   Neck: Normal range of motion. Neck supple. No tracheal deviation present. No thyromegaly present.   Cardiovascular: Normal rate, regular rhythm and normal heart sounds.    No murmur heard.  Pulmonary/Chest: Effort normal and breath sounds normal. No respiratory distress.   Abdominal: Soft. Bowel sounds are normal. She exhibits no distension. There is tenderness (mild lower abdominal/pelvis tenderness but not severe.). There is no rebound and no guarding.   Musculoskeletal: Normal range of motion. She exhibits no edema or tenderness.   Lymphadenopathy:     She has no cervical adenopathy.   Neurological: She is alert. She exhibits normal muscle tone.   Skin: Skin is warm and dry. No rash noted. She is not diaphoretic.   Psychiatric: She has a normal mood and affect.   Nursing note and vitals reviewed.      ED Course     Previous LABS:  Component      Latest Ref Rng & Units 7/26/2018 7/26/2018 7/26/2018           7:30 AM  7:30 AM  7:59 AM   WBC      4.0 - 11.0 10e9/L 6.2     RBC Count      3.8 - 5.2 10e12/L 4.59     Hemoglobin      11.7 - 15.7 g/dL 14.4     Hematocrit      35.0 - 47.0 % 42.7     MCV      78 - 100 fl 93     MCH      26.5 - 33.0 pg 31.4     MCHC      31.5 - 36.5 g/dL 33.7     RDW      10.0 - 15.0 % 12.6     Platelet Count      150 - 450 10e9/L 200     ABO        O    RH(D)        Neg    Antibody Screen        Neg    Test Valid Only At        [facility]    Specimen Expires        07/29/2018    HCG Qual Urine      NEG:Negative   Negative     Component      Latest Ref Rng & Units 7/27/2018           4:20 AM   WBC      4.0 - 11.0 10e9/L    RBC Count      3.8 - 5.2  10e12/L    Hemoglobin      11.7 - 15.7 g/dL 11.7   Hematocrit      35.0 - 47.0 %    MCV      78 - 100 fl    MCH      26.5 - 33.0 pg    MCHC      31.5 - 36.5 g/dL    RDW      10.0 - 15.0 %    Platelet Count      150 - 450 10e9/L    ABO          RH(D)          Antibody Screen          Test Valid Only At          Specimen Expires          HCG Qual Urine      NEG:Negative      ED Course     Procedures               Critical Care time:  none               Results for orders placed or performed during the hospital encounter of 08/09/18 (from the past 24 hour(s))   CBC with platelets differential   Result Value Ref Range    WBC 12.8 (H) 4.0 - 11.0 10e9/L    RBC Count 4.87 3.8 - 5.2 10e12/L    Hemoglobin 15.3 11.7 - 15.7 g/dL    Hematocrit 44.7 35.0 - 47.0 %    MCV 92 78 - 100 fl    MCH 31.4 26.5 - 33.0 pg    MCHC 34.2 31.5 - 36.5 g/dL    RDW 12.4 10.0 - 15.0 %    Platelet Count 295 150 - 450 10e9/L    Diff Method Automated Method     % Neutrophils 78.1 %    % Lymphocytes 14.3 %    % Monocytes 4.9 %    % Eosinophils 1.3 %    % Basophils 0.9 %    % Immature Granulocytes 0.5 %    Absolute Neutrophil 10.0 (H) 1.6 - 8.3 10e9/L    Absolute Lymphocytes 1.8 0.8 - 5.3 10e9/L    Absolute Monocytes 0.6 0.0 - 1.3 10e9/L    Absolute Eosinophils 0.2 0.0 - 0.7 10e9/L    Absolute Basophils 0.1 0.0 - 0.2 10e9/L    Abs Immature Granulocytes 0.1 0 - 0.4 10e9/L   ABO/Rh type and screen   Result Value Ref Range    ABO O     RH(D) Neg     Antibody Screen Neg     Test Valid Only At [facility]        Specimen Expires 08/12/2018    Basic metabolic panel   Result Value Ref Range    Sodium 139 134 - 144 mmol/L    Potassium 3.6 3.5 - 5.1 mmol/L    Chloride 104 98 - 107 mmol/L    Carbon Dioxide 27 21 - 31 mmol/L    Anion Gap 8 3 - 14 mmol/L    Glucose 83 70 - 105 mg/dL    Urea Nitrogen 9 7 - 25 mg/dL    Creatinine 0.78 0.60 - 1.20 mg/dL    GFR Estimate 88 >60 mL/min/1.7m2    GFR Estimate If Black >90 >60 mL/min/1.7m2    Calcium  9.7 8.6 - 10.3 mg/dL       Medications - No data to display    5:00 PM Dr. JENELLE Ch, OB/GYN has been to the ED and examined patient without worrisome bleeding and feels she is stable and clear for discharge home.    Assessments & Plan (with Medical Decision Making)   27 year old female 2 weeks s/p CARLITA with some vaginal bleeding but stable to increased hgb and no worrisome active bleeding.  Patient is able to discharge home and follow up as scheduled.    I have reviewed the nursing notes.    I have reviewed the findings, diagnosis, plan and need for follow up with the patient.       New Prescriptions    No medications on file       Final diagnoses:   S/P laparoscopic hysterectomy   Postoperative vaginal bleeding following genitourinary procedure       8/9/2018   [facility]     Joey Gurrola MD  08/09/18 [id number]

## 2018-08-09 NOTE — ED AVS SNAPSHOT
[facility]    [address]    [place] [address]    Phone:  [phone]    Fax:  [phone]                                       Yohana Robertson   MRN: [id number]    Department:  [facility]   Date of Visit:  8/9/2018           After Visit Summary Signature Page     I have received my discharge instructions, and my questions have been answered. I have discussed any challenges I see with this plan with the nurse or doctor.    ..........................................................................................................................................  Patient/Patient Representative Signature      ..........................................................................................................................................  Patient Representative Print Name and Relationship to Patient    ..................................................               ................................................  Date                                            Time    ..........................................................................................................................................  Reviewed by Signature/Title    ...................................................              ..............................................  Date                                                            Time

## 2018-08-10 ENCOUNTER — OFFICE VISIT (OUTPATIENT)
Dept: OBGYN | Facility: OTHER | Age: 28
End: 2018-08-10
Attending: OBSTETRICS & GYNECOLOGY
Payer: MEDICAID

## 2018-08-10 VITALS
HEART RATE: 76 BPM | SYSTOLIC BLOOD PRESSURE: 112 MMHG | WEIGHT: 151 LBS | BODY MASS INDEX: 25.16 KG/M2 | HEIGHT: 65 IN | DIASTOLIC BLOOD PRESSURE: 80 MMHG | TEMPERATURE: 99.2 F

## 2018-08-10 DIAGNOSIS — B96.89 BACTERIAL VAGINOSIS: Primary | ICD-10-CM

## 2018-08-10 DIAGNOSIS — Z90.710 S/P LAPAROSCOPIC HYSTERECTOMY: Primary | ICD-10-CM

## 2018-08-10 DIAGNOSIS — N76.0 BACTERIAL VAGINOSIS: Primary | ICD-10-CM

## 2018-08-10 DIAGNOSIS — N89.8 VAGINAL ITCHING: ICD-10-CM

## 2018-08-10 LAB
SPECIMEN SOURCE: ABNORMAL
WET PREP SPEC: ABNORMAL

## 2018-08-10 PROCEDURE — 87210 SMEAR WET MOUNT SALINE/INK: CPT | Performed by: OBSTETRICS & GYNECOLOGY

## 2018-08-10 PROCEDURE — 99024 POSTOP FOLLOW-UP VISIT: CPT | Performed by: OBSTETRICS & GYNECOLOGY

## 2018-08-10 PROCEDURE — G0463 HOSPITAL OUTPT CLINIC VISIT: HCPCS

## 2018-08-10 RX ORDER — METRONIDAZOLE 500 MG/1
500 TABLET ORAL 2 TIMES DAILY
Qty: 14 TABLET | Refills: 0 | Status: SHIPPED | OUTPATIENT
Start: 2018-08-10 | End: 2018-08-20

## 2018-08-10 ASSESSMENT — ANXIETY QUESTIONNAIRES
2. NOT BEING ABLE TO STOP OR CONTROL WORRYING: SEVERAL DAYS
5. BEING SO RESTLESS THAT IT IS HARD TO SIT STILL: SEVERAL DAYS
6. BECOMING EASILY ANNOYED OR IRRITABLE: MORE THAN HALF THE DAYS
7. FEELING AFRAID AS IF SOMETHING AWFUL MIGHT HAPPEN: NOT AT ALL
IF YOU CHECKED OFF ANY PROBLEMS ON THIS QUESTIONNAIRE, HOW DIFFICULT HAVE THESE PROBLEMS MADE IT FOR YOU TO DO YOUR WORK, TAKE CARE OF THINGS AT HOME, OR GET ALONG WITH OTHER PEOPLE: NOT DIFFICULT AT ALL
GAD7 TOTAL SCORE: 6
3. WORRYING TOO MUCH ABOUT DIFFERENT THINGS: SEVERAL DAYS
1. FEELING NERVOUS, ANXIOUS, OR ON EDGE: NOT AT ALL

## 2018-08-10 ASSESSMENT — PATIENT HEALTH QUESTIONNAIRE - PHQ9: 5. POOR APPETITE OR OVEREATING: SEVERAL DAYS

## 2018-08-10 ASSESSMENT — PAIN SCALES - GENERAL: PAINLEVEL: MILD PAIN (3)

## 2018-08-10 NOTE — MR AVS SNAPSHOT
After Visit Summary   8/10/2018    Yohana Robertson    MRN: [id number]           Patient Information     Date Of Birth          1990        Visit Information        Provider Department      8/10/2018 11:00 AM Renate Ch MD [facility]        Today's Diagnoses     S/P laparoscopic hysterectomy    -  1    Vaginal itching           Follow-ups after your visit        Your next 10 appointments already scheduled     Sep 07, 2018 11:15 AM CDT   Office Visit with Renate Ch MD   [facility] ([facility])    [address]  [place] [address]   [phone]           Bring a current list of meds and any records pertaining to this visit. For Physicals, please bring immunization records and any forms needing to be filled out. Please arrive 10 minutes early to complete paperwork.              Who to contact     If you have questions or need follow up information about today's clinic visit or your schedule please contact [facility] directly at [phone].  Normal or non-critical lab and imaging results will be communicated to you by Simmrhart, letter or phone within 4 business days after the clinic has received the results. If you do not hear from us within 7 days, please contact the clinic through NativeX or phone. If you have a critical or abnormal lab result, we will notify you by phone as soon as possible.  Submit refill requests through NativeX or call your pharmacy and they will forward the refill request to us. Please allow 3 business days for your refill to be completed.          Additional Information About Your Visit        SimmrharMembersuite Information     NativeX gives you secure access to your electronic health record. If you see a primary care provider, you can also send messages to your care team and make appointments. If you have questions, please call your primary care clinic.  If you do  "not have a primary care provider, please call [phone] and they will assist you.        Care EveryWhere ID     This is your Care EveryWhere ID. This could be used by other organizations to access your [place] medical records  [id number]        Your Vitals Were     Pulse Temperature Height Last Period BMI (Body Mass Index)       76 99.2  F (37.3  C) (Tympanic) 1.651 m (5' 5\") 07/01/2018 25.13 kg/m2        Blood Pressure from Last 3 Encounters:   08/10/18 112/80   08/09/18 100/68   07/27/18 129/83    Weight from Last 3 Encounters:   08/10/18 68.5 kg (151 lb)   08/09/18 67.1 kg (148 lb)   07/27/18 70.1 kg (154 lb 8.7 oz)              We Performed the Following     Wet Prep, Genital        Primary Care Provider Office Phone # Fax #    Oscar BUCK MD Marcelo [phone]       [address] [facility] [address]        Equal Access to Services     [facility]: Hadii aad ku hadasho Soomaali, waaxda luqadaha, qaybta kaalmada adeegyajayna, milly hull . So [facility] [phone].    ATENCIÓN: Si habla español, tiene a magallanes disposición servicios gratuitos de asistencia lingüística. Llame al [phone].    We comply with applicable federal civil rights laws and [place] laws. We do not discriminate on the basis of race, color, national origin, age, disability, sex, sexual orientation, or gender identity.            Thank you!     Thank you for choosing [facility]  for your care. Our goal is always to provide you with excellent care. Hearing back from our patients is one way we can continue to improve our services. Please take a few minutes to complete the written survey that you may receive in the mail after your visit with us. Thank you!             Your Updated Medication List - Protect others around you: Learn how to safely use, store and throw away your medicines at www.disposemymeds.org.          This list is accurate as of 8/10/18 11:39 AM.  Always " use your most recent med list.                   Brand Name Dispense Instructions for use Diagnosis    ibuprofen 600 MG tablet    ADVIL/MOTRIN     Take 600 mg by mouth 4 times daily as needed for pain Max dose 3200 mg in 24 hrs

## 2018-08-10 NOTE — PROGRESS NOTES
"Per result note from Dr. Renate Ch MD, \"Please send metronidazole 500mg BID PO x7 days to the pharmacy of her choice.\"    Patient notified of wet prep result and asked that presci[facility] be [facility].    Rx sent.    Katerina Ludwig RN...................8/10/2018 2:11 PM    "

## 2018-08-10 NOTE — PROGRESS NOTES
"Postoperative Visit  8/10/2018    S: Yohana Robertson is a 27 year old  here for post-operative visit following TLH, BS, cysto on 18.  She came to the ED yesterday with complaints of vaginal bleeding and fever. She was afebrile upon presentation and had a normal WBC count. Her Hgb was stable at 15.3 and she was discharged. Since then, she has had no bright red bleeding, just some brown discharge. She also has a vaginal itch and odor.     O:   /80  Pulse 76  Temp 99.2  F (37.3  C) (Tympanic)  Ht 1.651 m (5' 5\")  Wt 68.5 kg (151 lb)  LMP 2018  BMI 25.13 kg/m2  Gen:  Well-appearing, NAD  Abd: soft, nondistended, nontender. Incisions healing well  Pelvic: healing vaginal cuff. Small amount of dark brown discharge. No erythema of cuff. Visually intact.     Labs:   Hemoglobin   Date Value Ref Range Status   2018 15.3 11.7 - 15.7 g/dL Final     A/P:   Ms. Yohana Robertson is a 27 year old  two weeks s/p TLH, BS, cysto. Doing well overall. Cuff does not appear infected and had a normal WBC yesterday. Wet prep collected for itching. Discussed indications to return for care. Otherwise RTC 4 weeks for final postop check    Renate Ch MD  OB/GYN  8/10/2018 11:10 AM      "

## 2018-08-10 NOTE — NURSING NOTE
Post- op 2 week Hysterectomy 7/26/18. Went to ER yesterday and they think she has a pulled stitch.

## 2018-08-10 NOTE — LETTER
"8/10/2018       RE: Yohana Robertson  [address] [place] [address]     Dear Colleague,    Thank you for referring your patient, Yohana Robertson, to the [facility]. Please see a copy of my visit note below.    Postoperative Visit  8/10/2018    S: Yohana Robertson is a 27 year old  here for post-operative visit following TLH, BS, cysto on 18.  She came to the ED yesterday with complaints of vaginal bleeding and fever. She was afebrile upon presentation and had a normal WBC count. Her Hgb was stable at 15.3 and she was discharged. Since then, she has had no bright red bleeding, just some brown discharge. She also has a vaginal itch and odor.     O:   /80  Pulse 76  Temp 99.2  F (37.3  C) (Tympanic)  Ht 1.651 m (5' 5\")  Wt 68.5 kg (151 lb)  LMP 2018  BMI 25.13 kg/m2  Gen:  Well-appearing, NAD  Abd: soft, nondistended, nontender. Incisions healing well  Pelvic: healing vaginal cuff. Small amount of dark brown discharge. No erythema of cuff. Visually intact.     Labs:   Hemoglobin   Date Value Ref Range Status   2018 15.3 11.7 - 15.7 g/dL Final     A/P:   Ms. Yohana Robertson is a 27 year old  two weeks s/p TLH, BS, cysto. Doing well overall. Cuff does not appear infected and had a normal WBC yesterday. Wet prep collected for itching. Discussed indications to return for care. Otherwise RTC 4 weeks for final postop check      Again, thank you for allowing me to participate in the care of your patient.      Sincerely,    Renate Ch MD      "

## 2018-08-11 ASSESSMENT — ASTHMA QUESTIONNAIRES: ACT_TOTALSCORE: 20

## 2018-08-11 ASSESSMENT — PATIENT HEALTH QUESTIONNAIRE - PHQ9: SUM OF ALL RESPONSES TO PHQ QUESTIONS 1-9: 8

## 2018-08-11 ASSESSMENT — ANXIETY QUESTIONNAIRES: GAD7 TOTAL SCORE: 6

## 2018-08-15 ENCOUNTER — MYC MEDICAL ADVICE (OUTPATIENT)
Dept: OBGYN | Facility: OTHER | Age: 28
End: 2018-08-15

## 2018-08-20 ENCOUNTER — APPOINTMENT (OUTPATIENT)
Dept: CT IMAGING | Facility: OTHER | Age: 28
End: 2018-08-20
Attending: EMERGENCY MEDICINE
Payer: MEDICAID

## 2018-08-20 ENCOUNTER — MYC MEDICAL ADVICE (OUTPATIENT)
Dept: OBGYN | Facility: OTHER | Age: 28
End: 2018-08-20

## 2018-08-20 ENCOUNTER — HOSPITAL ENCOUNTER (EMERGENCY)
Facility: OTHER | Age: 28
Discharge: HOME | End: 2018-08-20
Attending: EMERGENCY MEDICINE | Admitting: EMERGENCY MEDICINE
Payer: MEDICAID

## 2018-08-20 ENCOUNTER — TELEPHONE (OUTPATIENT)
Dept: FAMILY MEDICINE | Facility: OTHER | Age: 28
End: 2018-08-20

## 2018-08-20 ENCOUNTER — NURSE TRIAGE (OUTPATIENT)
Dept: OBGYN | Facility: OTHER | Age: 28
End: 2018-08-20

## 2018-08-20 VITALS
HEART RATE: 59 BPM | DIASTOLIC BLOOD PRESSURE: 86 MMHG | HEIGHT: 65 IN | RESPIRATION RATE: 20 BRPM | WEIGHT: 148 LBS | TEMPERATURE: 99.3 F | BODY MASS INDEX: 24.66 KG/M2 | SYSTOLIC BLOOD PRESSURE: 138 MMHG | OXYGEN SATURATION: 100 %

## 2018-08-20 DIAGNOSIS — R50.9 FEVER, UNSPECIFIED FEVER CAUSE: ICD-10-CM

## 2018-08-20 DIAGNOSIS — R07.89 ATYPICAL CHEST PAIN: ICD-10-CM

## 2018-08-20 LAB
ALBUMIN UR-MCNC: NEGATIVE MG/DL
ANION GAP SERPL CALCULATED.3IONS-SCNC: 7 MMOL/L (ref 3–14)
APPEARANCE UR: CLEAR
BASOPHILS # BLD AUTO: 0.1 10E9/L (ref 0–0.2)
BASOPHILS NFR BLD AUTO: 0.9 %
BILIRUB UR QL STRIP: NEGATIVE
BUN SERPL-MCNC: 11 MG/DL (ref 7–25)
CALCIUM SERPL-MCNC: 9.5 MG/DL (ref 8.6–10.3)
CHLORIDE SERPL-SCNC: 105 MMOL/L (ref 98–107)
CO2 SERPL-SCNC: 27 MMOL/L (ref 21–31)
COLOR UR AUTO: YELLOW
CREAT SERPL-MCNC: 0.75 MG/DL (ref 0.6–1.2)
D DIMER PPP DDU-MCNC: 293 NG/ML D-DU (ref 0–230)
DIFFERENTIAL METHOD BLD: NORMAL
EOSINOPHIL # BLD AUTO: 0.2 10E9/L (ref 0–0.7)
EOSINOPHIL NFR BLD AUTO: 2.1 %
ERYTHROCYTE [DISTWIDTH] IN BLOOD BY AUTOMATED COUNT: 12.4 % (ref 10–15)
GFR SERPL CREATININE-BSD FRML MDRD: >90 ML/MIN/1.7M2
GLUCOSE SERPL-MCNC: 95 MG/DL (ref 70–105)
GLUCOSE UR STRIP-MCNC: NEGATIVE MG/DL
HCT VFR BLD AUTO: 42.4 % (ref 35–47)
HGB BLD-MCNC: 14.6 G/DL (ref 11.7–15.7)
HGB UR QL STRIP: NEGATIVE
IMM GRANULOCYTES # BLD: 0 10E9/L (ref 0–0.4)
IMM GRANULOCYTES NFR BLD: 0.1 %
KETONES UR STRIP-MCNC: NEGATIVE MG/DL
LEUKOCYTE ESTERASE UR QL STRIP: NEGATIVE
LYMPHOCYTES # BLD AUTO: 1.7 10E9/L (ref 0.8–5.3)
LYMPHOCYTES NFR BLD AUTO: 22.2 %
MCH RBC QN AUTO: 31.1 PG (ref 26.5–33)
MCHC RBC AUTO-ENTMCNC: 34.4 G/DL (ref 31.5–36.5)
MCV RBC AUTO: 90 FL (ref 78–100)
MONOCYTES # BLD AUTO: 0.5 10E9/L (ref 0–1.3)
MONOCYTES NFR BLD AUTO: 7 %
NEUTROPHILS # BLD AUTO: 5.1 10E9/L (ref 1.6–8.3)
NEUTROPHILS NFR BLD AUTO: 67.7 %
NITRATE UR QL: NEGATIVE
PH UR STRIP: 7 PH (ref 5–9)
PLATELET # BLD AUTO: 232 10E9/L (ref 150–450)
POTASSIUM SERPL-SCNC: 3.9 MMOL/L (ref 3.5–5.1)
RBC # BLD AUTO: 4.69 10E12/L (ref 3.8–5.2)
SODIUM SERPL-SCNC: 139 MMOL/L (ref 134–144)
SOURCE: NORMAL
SP GR UR STRIP: 1.01 (ref 1–1.03)
UROBILINOGEN UR STRIP-ACNC: 0.2 EU/DL (ref 0.2–1)
WBC # BLD AUTO: 7.6 10E9/L (ref 4–11)

## 2018-08-20 PROCEDURE — 81003 URINALYSIS AUTO W/O SCOPE: CPT | Performed by: EMERGENCY MEDICINE

## 2018-08-20 PROCEDURE — 36415 COLL VENOUS BLD VENIPUNCTURE: CPT | Performed by: EMERGENCY MEDICINE

## 2018-08-20 PROCEDURE — 96374 THER/PROPH/DIAG INJ IV PUSH: CPT | Mod: XU | Performed by: EMERGENCY MEDICINE

## 2018-08-20 PROCEDURE — 80048 BASIC METABOLIC PNL TOTAL CA: CPT | Performed by: EMERGENCY MEDICINE

## 2018-08-20 PROCEDURE — 99285 EMERGENCY DEPT VISIT HI MDM: CPT | Mod: 25 | Performed by: EMERGENCY MEDICINE

## 2018-08-20 PROCEDURE — 99284 EMERGENCY DEPT VISIT MOD MDM: CPT | Mod: Z6 | Performed by: EMERGENCY MEDICINE

## 2018-08-20 PROCEDURE — 85379 FIBRIN DEGRADATION QUANT: CPT | Performed by: EMERGENCY MEDICINE

## 2018-08-20 PROCEDURE — 25000128 H RX IP 250 OP 636: Performed by: EMERGENCY MEDICINE

## 2018-08-20 PROCEDURE — 71260 CT THORAX DX C+: CPT

## 2018-08-20 PROCEDURE — 85025 COMPLETE CBC W/AUTO DIFF WBC: CPT | Performed by: EMERGENCY MEDICINE

## 2018-08-20 RX ORDER — KETOROLAC TROMETHAMINE 30 MG/ML
30 INJECTION, SOLUTION INTRAMUSCULAR; INTRAVENOUS EVERY 6 HOURS PRN
Status: DISCONTINUED | OUTPATIENT
Start: 2018-08-20 | End: 2018-08-20 | Stop reason: HOSPADM

## 2018-08-20 RX ORDER — KETOROLAC TROMETHAMINE 30 MG/ML
60 INJECTION, SOLUTION INTRAMUSCULAR; INTRAVENOUS ONCE
Status: DISCONTINUED | OUTPATIENT
Start: 2018-08-20 | End: 2018-08-20

## 2018-08-20 RX ADMIN — KETOROLAC TROMETHAMINE 30 MG: 30 INJECTION, SOLUTION INTRAMUSCULAR at 15:42

## 2018-08-20 RX ADMIN — IOHEXOL 75 ML: 300 INJECTION, SOLUTION INTRAVENOUS at 16:25

## 2018-08-20 ASSESSMENT — ENCOUNTER SYMPTOMS
CHILLS: 0
ABDOMINAL PAIN: 1
HEADACHES: 0
AGITATION: 0
NAUSEA: 0
SHORTNESS OF BREATH: 1
FEVER: 0
CHEST TIGHTNESS: 1
VOMITING: 0
ARTHRALGIAS: 0
DYSURIA: 0

## 2018-08-20 NOTE — ED PROVIDER NOTES
History   No chief complaint on file.    The history is provided by the patient.     Yohana Robertson is a 27 year old female who is here complaining of chest pain and shortness of breath.  She had a laparoscopic hysterectomy approximately 1 month ago.  4 days ago she had a fairly sudden onset of left anterior chest pain with some shortness of breath.  This radiated down her left arm as well.  She is continuing to have some heaviness in the chest, continues to feel somewhat short of breath, and still has a little bit of numbness in the left arm.  Denies any palpitations.  She is not coughing.  She has been running a fever for almost 2 weeks.  Initially it was as high as 102  but for the last few days has been running  .  She is also having some left lower abdominal pain that she has had ever since the surgery.  It is really not changing and is certainly not going away.  She has a small amount of dark bloody type vaginal discharge.  Not enough that she is wearing any pads and only notices it when she wipes when she goes to the bathroom.  She is having a few loose diarrhea type stools per day and she describes them as very dark brown to black.  Urinating normally.    Problem List:    Patient Active Problem List    Diagnosis Date Noted     S/P laparoscopic hysterectomy 07/26/2018     Priority: Medium     Former smoker 07/03/2018     Priority: Medium     Asthma 02/11/2018     Priority: Medium     Attention deficit disorder 02/11/2018     Priority: Medium     Acute bilateral low back pain with sciatica 07/27/2017     Priority: Medium     Anxiety 01/09/2014     Priority: Medium     Severe episode of recurrent major depressive disorder (H) 12/26/2013     Priority: Medium     Nonallopathic lesion of lumbar region 08/08/2013     Priority: Medium     IBS (irritable bowel syndrome) 03/28/2013     Priority: Medium     Bunion, left foot 04/11/2012     Priority: Medium     Common migraine 04/20/2011     Priority: Medium         Past Medical History:    Past Medical History:   Diagnosis Date     Bunion of great toe of left foot      Contraceptive management        Past Surgical History:    Past Surgical History:   Procedure Laterality Date     BUNIONECTOMY      4/2005,Left     COLONOSCOPY      7/2013,Normal     CYSTOSCOPY N/A 7/26/2018    Procedure: CYSTOSCOPY;;  Surgeon: Renate Ch MD;  Location:  OR     DENTAL SURGERY      wisdom teeth     FRACTURE SURGERY      6/13,L Foot-tarsometatarsal realignment arthrodesis with plate and screw fixation, capsule tendon balancing procedures about the first tarsometatarsal joint, Landry Gibson DPM  [facility]     hardware removal Left     7/26/2006,205448,REMOVAL OF FOREIGN BODY,Removal of harware L foot after bunionectomy [Other][[[[     LAPAROSCOPIC HYSTERECTOMY TOTAL, SALPINGECTOMY N/A 7/26/2018    Procedure: LAPAROSCOPIC HYSTERECTOMY TOTAL, SALPINGECTOMY;  Total Laparoscopic Hysterectomy & Bilateral Salpingectomy, Cystoscopy.;  Surgeon: Renate Ch MD;  Location:  OR     TONSILLECTOMY      12/2005       Family History:    Family History   Problem Relation Age of Onset     HEART DISEASE Mother      Heart Disease,Aortic stenosis and valve replaced     Family History Negative Father      Good Health     Asthma Maternal Grandmother      Asthma     HEART DISEASE Maternal Grandfather      Heart Disease       Social History:  Marital Status:  Single [1]  Social History   Substance Use Topics     Smoking status: Current Every Day Smoker     Years: 9.00     Types: Cigarettes     Smokeless tobacco: Never Used      Comment: using e-cig     Alcohol use 12.6 oz/week      Comment: couple of brandys a night        Medications:      ibuprofen (ADVIL/MOTRIN) 600 MG tablet         Review of Systems   Constitutional: Negative for chills and fever.   HENT: Negative for congestion.    Eyes: Negative for visual disturbance.   Respiratory: Positive for chest tightness and  "shortness of breath.    Cardiovascular: Positive for chest pain.   Gastrointestinal: Positive for abdominal pain. Negative for nausea and vomiting.   Genitourinary: Negative for decreased urine volume and dysuria.   Musculoskeletal: Negative for arthralgias.   Skin: Negative for rash.   Neurological: Negative for headaches.   Psychiatric/Behavioral: Negative for agitation.       Physical Exam   BP: (!) 155/113  Pulse: 59  Temp: 99.3  F (37.4  C)  Resp: 20  Height: 165.1 cm (5' 5\")  Weight: 67.1 kg (148 lb)  SpO2: 100 %      Physical Exam   Constitutional: She is oriented to person, place, and time. She appears well-developed and well-nourished. No distress.   HENT:   Head: Normocephalic and atraumatic.   Eyes: Conjunctivae are normal.   Neck: Neck supple.   Cardiovascular: Normal rate, regular rhythm and normal heart sounds.    Pulmonary/Chest: Effort normal and breath sounds normal. No respiratory distress.   Abdominal: Soft. Bowel sounds are normal. She exhibits no distension. There is tenderness in the left lower quadrant.   Neurological: She is alert and oriented to person, place, and time.   Skin: Skin is warm and dry. She is not diaphoretic.   Psychiatric: She has a normal mood and affect. Her behavior is normal.   Nursing note and vitals reviewed.      ED Course     ED Course     Procedures                 Results for orders placed or performed during the hospital encounter of 08/20/18 (from the past 24 hour(s))   CBC with platelets differential   Result Value Ref Range    WBC 7.6 4.0 - 11.0 10e9/L    RBC Count 4.69 3.8 - 5.2 10e12/L    Hemoglobin 14.6 11.7 - 15.7 g/dL    Hematocrit 42.4 35.0 - 47.0 %    MCV 90 78 - 100 fl    MCH 31.1 26.5 - 33.0 pg    MCHC 34.4 31.5 - 36.5 g/dL    RDW 12.4 10.0 - 15.0 %    Platelet Count 232 150 - 450 10e9/L    Diff Method Automated Method     % Neutrophils 67.7 %    % Lymphocytes 22.2 %    % Monocytes 7.0 %    % Eosinophils 2.1 %    % Basophils 0.9 %    % Immature " Granulocytes 0.1 %    Absolute Neutrophil 5.1 1.6 - 8.3 10e9/L    Absolute Lymphocytes 1.7 0.8 - 5.3 10e9/L    Absolute Monocytes 0.5 0.0 - 1.3 10e9/L    Absolute Eosinophils 0.2 0.0 - 0.7 10e9/L    Absolute Basophils 0.1 0.0 - 0.2 10e9/L    Abs Immature Granulocytes 0.0 0 - 0.4 10e9/L   D-Dimer (HI,GH)   Result Value Ref Range    D-Dimer ng/mL 293 (H) 0 - 230 ng/ml D-DU   Basic metabolic panel   Result Value Ref Range    Sodium 139 134 - 144 mmol/L    Potassium 3.9 3.5 - 5.1 mmol/L    Chloride 105 98 - 107 mmol/L    Carbon Dioxide 27 21 - 31 mmol/L    Anion Gap 7 3 - 14 mmol/L    Glucose 95 70 - 105 mg/dL    Urea Nitrogen 11 7 - 25 mg/dL    Creatinine 0.75 0.60 - 1.20 mg/dL    GFR Estimate >90 >60 mL/min/1.7m2    GFR Estimate If Black >90 >60 mL/min/1.7m2    Calcium 9.5 8.6 - 10.3 mg/dL   *UA reflex to Microscopic   Result Value Ref Range    Color Urine Yellow     Appearance Urine Clear     Glucose Urine Negative NEG^Negative mg/dL    Bilirubin Urine Negative NEG^Negative    Ketones Urine Negative NEG^Negative mg/dL    Specific Gravity Urine 1.010 1.000 - 1.030    Blood Urine Negative NEG^Negative    pH Urine 7.0 5.0 - 9.0 pH    Protein Albumin Urine Negative NEG^Negative mg/dL    Urobilinogen Urine 0.2 0.2 - 1.0 EU/dL    Nitrite Urine Negative NEG^Negative    Leukocyte Esterase Urine Negative NEG^Negative    Source Unspecified Urine    CT Chest Pulmonary Embolism w Contrast    Narrative    PROCEDURE:  CT CHEST PULMONARY EMBOLISM W CONTRAST.    HISTORY:  Evaluate for pulmonary embolism.  Dyspnea;     TECHNIQUE:  Initial pre-contrast  and localizer images were  obtained.  Contrast enhanced helical CT pulmonary angiography was then  performed.  Routine transaxial and post-processed (multiplanar and/or  MIP) reformations were obtained.    COMPARISON:  None.    MEDS/CONTRAST:  100 ml omnipaque    PULMONARY CTA FINDINGS:  This is a diagnostic quality helical CT  pulmonary angiogram.  There is no acute pulmonary  embolism to the  first subsegmental pulmonary artery level.    OTHER FINDINGS:      The neck base is grossly symmetric and unremarkable in appearance.   The heart size is normal.  There is no pericardial thickening or  effusions.  There is no mediastinal, hilar, or axillary  lymphadenopathy.  The thoracic aorta is normal in size and course.     Limited views of the upper abdomen reveal no adrenal mass or acute  process.     The pleura is without thickening or effusions.  The central airways  are unremarkable. No focal consolidation or intrapulmonary mass is  identified.    No suspicious osseous lesion is identified.      Impression    IMPRESSION:    No acute pulmonary emboli.    TALIA BISWAS MD       Medications   ketorolac (TORADOL) injection 30 mg (30 mg Intravenous Given 8/20/18 1542)   iohexol (OMNIPAQUE) 300 mg/mL injection 75 mL (75 mLs Intravenous Given 8/20/18 1625)       Assessments & Plan (with Medical Decision Making)     I have reviewed the nursing notes.    I have reviewed the findings, diagnosis, plan and need for follow up with the patient.  Patient's d-dimer was slightly elevated and she is recently postop from a hysterectomy so CT scan for pulmonary embolus was performed.  This was negative as above.  The remainder of her labs were also quite reassuring.  I do not see anything ominous to explain her symptoms.  She does have a low-grade fever.  No obvious explanation for this.  It could well be viral in nature.  At this point I think it is safe to let her go home and give this a little bit more time, she does have a clinic appointment in 2 weeks time.  If she is worse prior to then she can return for further evaluation.    New Prescriptions    No medications on file       Final diagnoses:   Atypical chest pain   Fever, unspecified fever cause       8/20/2018   [facility]     Milo Bowser MD  08/20/18 [id number]

## 2018-08-20 NOTE — TELEPHONE ENCOUNTER
The patient was told that Oscar Robertson MD would not be able to see her. I offered other providers and she agreed. She was transferred to the appointment line.  Jessica Guerrero LPN..................8/20/2018   11:08 AM

## 2018-08-20 NOTE — ED TRIAGE NOTES
Pt comes in after having low grade fever and HA. Pt had a hysterectomy done in July and had a vaginal infection earlier this month. Pt had sharp chest pain intermittently since thursday. Pt reports left arm numbness.    Laxmi Miner RN on 8/20/2018 at 2:19 PM

## 2018-08-20 NOTE — TELEPHONE ENCOUNTER
Pt had hysterectomy and states had problems with heart rate going back up and now light headed gynecology said see pcp--Pt.would like to come in today or tomorrow

## 2018-08-20 NOTE — ED AVS SNAPSHOT
[facility]    [address]    [place] [address]    Phone:  [phone]    Fax:  [phone]                                       Yohana Robertson   MRN: [id number]    Department:  [facility]   Date of Visit:  8/20/2018           Patient Information     Date Of Birth          1990        Your diagnoses for this visit were:     Atypical chest pain     Fever, unspecified fever cause        You were seen by Milo Bowser MD.      Your next 10 appointments already scheduled     Sep 07, 2018 11:15 AM CDT   [facility]ate Ch MD   [facility]    [address]  [place] [address] [phone]           Bring a current list of meds and any records pertaining to this visit. For Physicals, please bring immunization records and any forms needing to be filled out. Please arrive 10 minutes early to complete paperwork.              24 Hour Appointment Hotline     To schedule an appointment at [facility], please call [phone]. If you don't have a family doctor or clinic, we will help you find one. [facility] clinics are conveniently located to serve the needs of you and your family.           Review of your medicines      Our records show that you are taking the medicines listed below. If these are incorrect, please call your family doctor or clinic.        Dose / Directions Last dose taken    ibuprofen 600 MG tablet   Commonly known as:  ADVIL/MOTRIN   Dose:  600 mg        Take 600 mg by mouth 4 times daily as needed for pain Max dose 3200 mg in 24 hrs   Refills:  0                Procedures and tests performed during your visit     *UA reflex to Microscopic    Basic metabolic panel    CBC with platelets differential    CT Chest Pulmonary Embolism w Contrast    D-Dimer (HI,GH)    Peripheral IV catheter      Orders Needing Specimen Collection     None      Pending  Results     No orders found from 8/18/2018 to 8/21/2018.            Pending Culture Results     No orders found from 8/18/2018 to 8/21/2018.            Pending Results Instructions     If you had any lab results that were not finalized at the time of your Discharge, you can call the ED Lab Result RN at [phone]. You will be contacted by this team for any positive Lab results or changes in treatment. The nurses are available 7 days a week from 10A to 6:30P.  You can leave a message 24 hours per day and they will return your call.        Thank you for choosing [facility]       Thank you for choosing [facility] for your care. Our goal is always to provide you with excellent care. Hearing back from our patients is one way we can continue to improve our services. Please take a few minutes to complete the written survey that you may receive in the mail after you visit with us. Thank you!        [facility] Information     [facility] gives you secure access to your electronic health record. If you see a primary care provider, you can also send messages to your care team and make appointments. If you have questions, please call your primary care clinic.  If you do not have a primary care provider, please call [phone] and they will assist you.        Care EveryWhere ID     This is your Care EveryWhere ID. This could be used by other organizations to access your [facility] medical records  [id number]        Equal Access to Services     DERRICK DÍAZ : Hadii belgica Perez, waflorinada lujenniferadaha, qaybta kaalmona conrad, milly mata. So [facility] [phone].    ATENCIÓN: Si habla español, tiene a magallanes disposición servicios gratuitos de asistencia lingüística. Llame al [phone].    We comply with applicable federal civil rights laws and [place] laws. We do not discriminate on the basis of race, color, national origin, age, disability, sex, sexual orientation, or gender identity.            After  Visit Summary       This is your record. Keep this with you and show to your community pharmacist(s) and doctor(s) at your next visit.

## 2018-08-20 NOTE — TELEPHONE ENCOUNTER
"This RN telephoned pt.  Pt reports an intermittent chest pain at 10/10, dyspnea, HA, fatigue, fever of  degrees and sharp pain radiating down to her left arm making her left arm feel \"numb and tingling\".  Advised and instructed of care advice.  Pt verbalizes understanding stating she will present to the ED either by having someone drive her or calling the ambulance at 911 for transportation and monitoring.  This RN telephoned ED with verbal report of pt's pending arrival.   Kelly Adames RN on 8/20/2018 at 11:50 AM     Reason for Disposition    Pain also present in shoulder(s) or arm(s) or jaw  (Exception: pain is clearly made worse by movement)    SEVERE chest pain    Additional Information    Negative: Severe difficulty breathing (e.g., struggling for each breath, speaks in single words)    Negative: Difficult to awaken or acting confused (e.g., disoriented, slurred speech)    Negative: Shock suspected (e.g., cold/pale/clammy skin, too weak to stand, low BP, rapid pulse)    Negative: [1] Chest pain lasts > 5 minutes AND [2] history of heart disease  (i.e., heart attack, bypass surgery, angina, angioplasty, CHF; not just a heart murmur)    Negative: [1] Chest pain lasts > 5 minutes AND [2] described as crushing, pressure-like, or heavy    Negative: [1] Chest pain lasts > 5 minutes AND [2] age > 50    Negative: [1] Chest pain lasts > 5 minutes AND [2] age > 30 AND [3] at least one cardiac risk factor (i.e., hypertension, diabetes, obesity, smoker or strong family history of heart disease)    Negative: [1] Chest pain lasts > 5 minutes AND [2] not relieved with nitroglycerin    Negative: Passed out (i.e., lost consciousness, collapsed and was not responding)    Negative: Heart beating < 50 beats per minute OR > 140 beats per minute    Negative: Sounds like a life-threatening emergency to the triager    Negative: Visible sweat on face or sweat dripping down face    Negative: Followed a chest injury    Answer " "Assessment - Initial Assessment Questions  1. LOCATION: \"Where does it hurt?\"        Left side of chest.  2. RADIATION: \"Does the pain go anywhere else?\" (e.g., into neck, jaw, arms, back)      Reports radiates to left arm, reporting numbness and tingling.  3. ONSET: \"When did the chest pain begin?\" (Minutes, hours or days)       Reports began Thursday evening.  4. PATTERN \"Does the pain come and go, or has it been constant since it started?\"  \"Does it get worse with exertion?\"       Reports intermittent chest pain, worsens with exertion.  5. DURATION: \"How long does it last\" (e.g., seconds, minutes, hours)      Reports lasts approx 10 minutes.  6. SEVERITY: \"How bad is the pain?\"  (e.g., Scale 1-10; mild, moderate, or severe)     - MILD (1-3): doesn't interfere with normal activities      - MODERATE (4-7): interferes with normal activities or awakens from sleep     - SEVERE (8-10): excruciating pain, unable to do any normal activities        Reports pain at 10 during episodes of chest pain.  7. CARDIAC RISK FACTORS: \"Do you have any history of heart problems or risk factors for heart disease?\" (e.g., prior heart attack, angina; high blood pressure, diabetes, being overweight, high cholesterol, smoking, or strong family history of heart disease)      Denies any risk factors.  8. PULMONARY RISK FACTORS: \"Do you have any history of lung disease?\"  (e.g., blood clots in lung, asthma, emphysema, birth control pills)      Reports she has asthma.  9. CAUSE: \"What do you think is causing the chest pain?\"      \"I don't know\".  10. OTHER SYMPTOMS: \"Do you have any other symptoms?\" (e.g., dizziness, nausea, vomiting, sweating, fever, difficulty breathing, cough)        Reports fatigue, dyspnea, HA, and fever of 99 to 102 degrees.  11. PREGNANCY: \"Is there any chance you are pregnant?\" \"When was your last menstrual period?\"        No, status post hysterectomy.    Protocols used: CHEST PAIN-ADULT-AH    "

## 2018-08-20 NOTE — ED TRIAGE NOTES
[place]-SUICIDE SEVERITY RATING SCALE   Screen with Triage Points for Emergency Department      Ask questions that are bolded and underlined.   Past  month   Ask Questions 1 and 2 YES NO   1)  Have you wished you were dead or wished you could go to sleep and not wake up?   no   2)  Have you actually had any thoughts of killing yourself?   no   If YES to 2, ask questions 3, 4, 5, and 6.  If NO to 2, go directly to question 6.   3)  Have you been thinking about how you might do this?   E.g.  I thought about taking an overdose but I never made a specific plan as to when where or how I would actually do it .and I would never go through with it.       4)  Have you had these thoughts and had some intention of acting on them?   As opposed to  I have the thoughts but I definitely will not do anything about them.       5)  Have you started to work out or worked out the details of how to kill yourself? Do you intend to carry out this plan?      6)  Have you ever done anything, started to do anything, or prepared to do anything to end your life?  Examples: Collected pills, obtained a gun, gave away valuables, wrote a will or suicide note, took out pills but didn t swallow any, held a gun but changed your mind or it was grabbed from your hand, went to the roof but didn t jump; or actually took pills, tried to shoot yourself, cut yourself, tried to hang yourself, etc.    If YES, ask: Was this within the past three months?  Lifetime    yes     Past 3 Months     no   Item 1:  Behavioral Health Referral at Discharge  Item 2:  Behavioral Health Referral at Discharge   Item 3:  Behavioral Health Consult (Psychiatric Nurse/) and consider Patient Safety Precautions  Item 4:  Immediate Notification of Physician and/or Behavioral Health and Patient Safety Precautions   Item 5:  Immediate Notification of Physician and/or Behavioral Health and Patient Safety Precautions  Item 6:  Over 3 months ago: Behavioral Health Consult  (Psychiatric Nurse/) and consider Patient Safety Precautions  OR  Item 6:  3 months ago or less: Immediate Notification of Physician and/or Behavioral Health and Patient Safety Precautions

## 2018-08-20 NOTE — ED AVS SNAPSHOT
[facility]    [address]    [place] [address]    Phone:  [phone]    Fax:  [phone]                                       Yohana Robertson   MRN: [id number]    Department:  [facility]   Date of Visit:  8/20/2018           After Visit Summary Signature Page     I have received my discharge instructions, and my questions have been answered. I have discussed any challenges I see with this plan with the nurse or doctor.    ..........................................................................................................................................  Patient/Patient Representative Signature      ..........................................................................................................................................  Patient Representative Print Name and Relationship to Patient    ..................................................               ................................................  Date                                            Time    ..........................................................................................................................................  Reviewed by Signature/Title    ...................................................              ..............................................  Date                                                            Time

## 2018-08-24 ENCOUNTER — MYC MEDICAL ADVICE (OUTPATIENT)
Dept: FAMILY MEDICINE | Facility: OTHER | Age: 28
End: 2018-08-24

## 2018-09-07 ENCOUNTER — OFFICE VISIT (OUTPATIENT)
Dept: OBGYN | Facility: OTHER | Age: 28
End: 2018-09-07
Attending: OBSTETRICS & GYNECOLOGY
Payer: COMMERCIAL

## 2018-09-07 VITALS
WEIGHT: 154.5 LBS | SYSTOLIC BLOOD PRESSURE: 108 MMHG | HEART RATE: 60 BPM | BODY MASS INDEX: 25.71 KG/M2 | DIASTOLIC BLOOD PRESSURE: 60 MMHG

## 2018-09-07 DIAGNOSIS — Z90.710 S/P LAPAROSCOPIC HYSTERECTOMY: Primary | ICD-10-CM

## 2018-09-07 DIAGNOSIS — N80.9 ENDOMETRIOSIS: ICD-10-CM

## 2018-09-07 PROCEDURE — 99024 POSTOP FOLLOW-UP VISIT: CPT | Performed by: OBSTETRICS & GYNECOLOGY

## 2018-09-07 PROCEDURE — G0463 HOSPITAL OUTPT CLINIC VISIT: HCPCS

## 2018-09-07 RX ORDER — NORGESTIMATE AND ETHINYL ESTRADIOL 0.25-0.035
1 KIT ORAL DAILY
Qty: 84 TABLET | Refills: 2 | Status: SHIPPED | OUTPATIENT
Start: 2018-09-07 | End: 2019-07-10

## 2018-09-07 ASSESSMENT — PAIN SCALES - GENERAL: PAINLEVEL: NO PAIN (0)

## 2018-09-07 ASSESSMENT — ANXIETY QUESTIONNAIRES
3. WORRYING TOO MUCH ABOUT DIFFERENT THINGS: NOT AT ALL
1. FEELING NERVOUS, ANXIOUS, OR ON EDGE: NOT AT ALL
5. BEING SO RESTLESS THAT IT IS HARD TO SIT STILL: NOT AT ALL
IF YOU CHECKED OFF ANY PROBLEMS ON THIS QUESTIONNAIRE, HOW DIFFICULT HAVE THESE PROBLEMS MADE IT FOR YOU TO DO YOUR WORK, TAKE CARE OF THINGS AT HOME, OR GET ALONG WITH OTHER PEOPLE: NOT DIFFICULT AT ALL
7. FEELING AFRAID AS IF SOMETHING AWFUL MIGHT HAPPEN: NOT AT ALL
GAD7 TOTAL SCORE: 0
2. NOT BEING ABLE TO STOP OR CONTROL WORRYING: NOT AT ALL
6. BECOMING EASILY ANNOYED OR IRRITABLE: NOT AT ALL

## 2018-09-07 ASSESSMENT — PATIENT HEALTH QUESTIONNAIRE - PHQ9: 5. POOR APPETITE OR OVEREATING: NOT AT ALL

## 2018-09-07 NOTE — PROGRESS NOTES
Postoperative Visit  2018    S: Yohana Robertson is a 27 year old  here for post-operative visit following TLH, BS, cysto on 18. She reports feeling well. She has no pain. She has no vaginal complaints.    O:   /60 (BP Location: Right arm, Patient Position: Sitting, Cuff Size: Adult Large)  Pulse 60  Wt 70.1 kg (154 lb 8 oz)  LMP 2018  BMI 25.71 kg/m2  Gen:  Well-appearing, NAD  Abd: soft, nondistended, nontender. All laparoscopic incisions healing well.  Pelvic: normal external genitalia, normal vagina. Cervix and uterus surgically absent. Cuff visually and digitally intact. No pelvic masses or tenderness.    A/P:   Ms. Yohana Robertson is a 27 year old  six weeks s/p TLH, BS, cysto. Intraoperative findings consistent with endometriosis and patient had presurgical desire for ovarian conservation. Discussed waiting to see how her pain is now that she has recovered vs starting suppressive therapy and she desires to start. Reviewed options, wants to start continuous OCPs for 6 months. Will return if she develops pain after stopping, otherwise RTC prn    Renate Ch MD  OB/GYN  2018 11:04 AM

## 2018-09-07 NOTE — NURSING NOTE
Patient presents today for her six week postop visit following a s/p laparoscopic hysterectomy. No concerns.  Lela Conteh LPN  9/7/2018  11:09 AM

## 2018-09-07 NOTE — MR AVS SNAPSHOT
After Visit Summary   9/7/2018    Yohana Robertson    MRN: [id number]           Patient Information     Date Of Birth          1990        Visit Information        Provider Department      9/7/2018 11:15 AM Renate Ch MD [facility]        Today's Diagnoses     S/P laparoscopic hysterectomy    -  1    Endometriosis           Follow-ups after your visit        Who to contact     If you have questions or need follow up information about today's clinic visit or your schedule please contact [facility] AND [facility] directly at [phone].  Normal or non-critical lab and imaging results will be communicated to you by Har[employer] Exchange[employer], letter or phone within 4 business days after the clinic has received the results. If you do not hear from us within 7 days, please contact the clinic through [employer] or phone. If you have a critical or abnormal lab result, we will notify you by phone as soon as possible.  Submit refill requests through [employer] or call your pharmacy and they will forward the refill request to us. Please allow 3 business days for your refill to be completed.          Additional Information About Your Visit        MyChart Information     [employer] gives you secure access to your electronic health record. If you see a primary care provider, you can also send messages to your care team and make appointments. If you have questions, please call your primary care clinic.  If you do not have a primary care provider, please call [phone] and they will assist you.        Care EveryWhere ID     This is your Care EveryWhere ID. This could be used by other organizations to access your [facility] medical records  [id number]        Your Vitals Were     Pulse Last Period BMI (Body Mass Index)             60 07/01/2018 25.71 kg/m2          Blood Pressure from Last 3 Encounters:   09/07/18 108/60   08/20/18 138/86   08/10/18 112/80    Weight from Last 3 Encounters:    09/07/18 70.1 kg (154 lb 8 oz)   08/20/18 67.1 kg (148 lb)   08/10/18 68.5 kg (151 lb)              Today, you had the following     No orders found for display         Today's Medication Changes          These changes are accurate as of 9/7/18 11:32 AM.  If you have any questions, ask your nurse or doctor.               Start taking these medicines.        Dose/Directions    norgestimate-ethinyl estradiol 0.25-35 MG-MCG per tablet   Commonly known as:  ORTHO-CYCLEN, SPRINTEC   Used for:  Endometriosis   Started by:  Renate Ch MD        Dose:  1 tablet   Take 1 tablet by mouth daily   Quantity:  84 tablet   Refills:  2            Where to get your medicines      These medications were sent to [id number] - [place], [place] - [address]  [address], [place] [address]     Phone:  [phone]     norgestimate-ethinyl estradiol 0.25-35 MG-MCG per tablet                Primary Care Provider Office Phone # Fax #    Oscar Robertson [phone] [address]  [facility] [address]        Equal Access to Services     DERRICK DÍAZ AH: Hadsamantha victoro Sorachid, waaxda lujenniferadaha, qaybta kaalmada adeegyada, milly mata. So [facility] [phone].    ATENCIÓN: Si habla español, tiene a magallanes disposición servicios gratuitos de asistencia lingüística. [facility] [phone].    We comply with applicable federal civil rights laws and [place] laws. We do not discriminate on the basis of race, color, national origin, age, disability, sex, sexual orientation, or gender identity.            Thank you!     Thank you for choosing [facility]  for your care. Our goal is always to provide you with excellent care. Hearing back from our patients is one way we can continue to improve our services. Please take a few minutes to complete the written survey that you may receive in the mail after your visit with us.  Thank you!             Your Updated Medication List - Protect others around you: Learn how to safely use, store and throw away your medicines at www.disposemymeds.org.          This list is accurate as of 9/7/18 11:32 AM.  Always use your most recent med list.                   Brand Name Dispense Instructions for use Diagnosis    ibuprofen 600 MG tablet    ADVIL/MOTRIN     Take 600 mg by mouth 4 times daily as needed for pain Max dose 3200 mg in 24 hrs        norgestimate-ethinyl estradiol 0.25-35 MG-MCG per tablet    ORTHO-CYCLEN, SPRINTEC    84 tablet    Take 1 tablet by mouth daily    Endometriosis

## 2018-09-08 ASSESSMENT — PATIENT HEALTH QUESTIONNAIRE - PHQ9: SUM OF ALL RESPONSES TO PHQ QUESTIONS 1-9: 0

## 2018-09-08 ASSESSMENT — ANXIETY QUESTIONNAIRES: GAD7 TOTAL SCORE: 0

## 2018-09-08 ASSESSMENT — ASTHMA QUESTIONNAIRES: ACT_TOTALSCORE: 23

## 2018-11-19 ENCOUNTER — ALLIED HEALTH/NURSE VISIT (OUTPATIENT)
Dept: FAMILY MEDICINE | Facility: OTHER | Age: 28
End: 2018-11-19
Attending: FAMILY MEDICINE
Payer: COMMERCIAL

## 2018-11-19 DIAGNOSIS — Z23 NEED FOR PROPHYLACTIC VACCINATION AND INOCULATION AGAINST INFLUENZA: Primary | ICD-10-CM

## 2018-11-19 PROCEDURE — 90471 IMMUNIZATION ADMIN: CPT

## 2018-11-19 PROCEDURE — 90686 IIV4 VACC NO PRSV 0.5 ML IM: CPT

## 2018-11-19 NOTE — MR AVS SNAPSHOT
After Visit Summary   11/19/2018    Yohana Robertson    MRN: [id number]           Patient Information     Date Of Birth          1990        Visit Information        Provider Department      11/19/2018 3:50 PM [facility]        Today's Diagnoses     Need for prophylactic vaccination and inoculation against influenza    -  1       Follow-ups after your visit        Who to contact     If you have questions or need follow up information about today's clinic visit or your schedule please contact [facility] directly at [phone].  Normal or non-critical lab and imaging results will be communicated to you by 51edjhart, letter or phone within 4 business days after the clinic has received the results. If you do not hear from us within 7 days, please contact the clinic through [employer] or phone. If you have a critical or abnormal lab result, we will notify you by phone as soon as possible.  Submit refill requests through [employer] or call your pharmacy and they will forward the refill request to us. Please allow 3 business days for your refill to be completed.          Additional Information About Your Visit        MyChart Information     [employer] gives you secure access to your electronic health record. If you see a primary care provider, you can also send messages to your care team and make appointments. If you have questions, please call your primary care clinic.  If you do not have a primary care provider, please call [phone] and they will assist you.        Care EveryWhere ID     This is your Care EveryWhere ID. This could be used by other organizations to access your [facility] medical records  [id number]        Your Vitals Were     Last Period                   07/01/2018            Blood Pressure from Last 3 Encounters:   09/07/18 108/60   08/20/18 138/86   08/10/18 112/80    Weight from Last 3 Encounters:   09/07/18 154 lb 8 oz (70.1 kg)    08/20/18 148 lb (67.1 kg)   08/10/18 151 lb (68.5 kg)              We Performed the Following     HC FLU VAC PRESRV FREE QUAD SPLIT VIR 3+YRS IM     Vaccine Administration, Initial [[id number]]        Primary Care Provider Office Phone # Fax #    Oscar BUCK [phone]       [address]   [facility] [address]        Equal Access to Services     [facility]: Hadii aad ku hadasho Soomaali, waaxda luqadaha, qaybta kaalmada adeegyada, waxay idiin hayaan adeeg fredasami fonsecadanielsolange . So [facility] [phone].    ATENCIÓN: Si tish quiñones, tiene a magallanes disposición servicios gratuitos de asistencia lingüística. Llame al [phone].    We comply with applicable federal civil rights laws and [place] laws. We do not discriminate on the basis of race, color, national origin, age, disability, sex, sexual orientation, or gender identity.            Thank you!     Thank you for choosing [facility] [place]  for your care. Our goal is always to provide you with excellent care. Hearing back from our patients is one way we can continue to improve our services. Please take a few minutes to complete the written survey that you may receive in the mail after your visit with us. Thank you!             Your Updated Medication List - Protect others around you: Learn how to safely use, store and throw away your medicines at www.disposemymeds.org.          This list is accurate as of 11/19/18  3:50 PM.  Always use your most recent med list.                   Brand Name Dispense Instructions for use Diagnosis    ibuprofen 600 MG tablet    ADVIL/MOTRIN     Take 600 mg by mouth 4 times daily as needed for pain Max dose 3200 mg in 24 hrs        norgestimate-ethinyl estradiol 0.25-35 MG-MCG per tablet    ORTHO-CYCLEN, SPRINTEC    84 tablet    Take 1 tablet by mouth daily    Endometriosis

## 2018-11-19 NOTE — PROGRESS NOTES
Injectable Influenza Immunization Documentation    1.  Is the person to be vaccinated sick today?   No    2. Does the person to be vaccinated have an allergy to a component   of the vaccine?   No  Egg Allergy Algorithm Link    3. Has the person to be vaccinated ever had a serious reaction   to influenza vaccine in the past?   No    4. Has the person to be vaccinated ever had Guillain-Barré syndrome?   No    Form completed by Lisa Sauceda LPN on 11/19/2018 at 3:32 PM

## 2019-04-25 ENCOUNTER — HOSPITAL ENCOUNTER (OUTPATIENT)
Dept: GENERAL RADIOLOGY | Facility: OTHER | Age: 29
Discharge: HOME | End: 2019-04-25
Attending: NURSE PRACTITIONER | Admitting: NURSE PRACTITIONER
Payer: COMMERCIAL

## 2019-04-25 ENCOUNTER — OFFICE VISIT (OUTPATIENT)
Dept: FAMILY MEDICINE | Facility: OTHER | Age: 29
End: 2019-04-25
Attending: NURSE PRACTITIONER
Payer: COMMERCIAL

## 2019-04-25 VITALS
RESPIRATION RATE: 18 BRPM | HEART RATE: 71 BPM | TEMPERATURE: 98.7 F | SYSTOLIC BLOOD PRESSURE: 122 MMHG | DIASTOLIC BLOOD PRESSURE: 82 MMHG

## 2019-04-25 DIAGNOSIS — S93.401A SPRAIN OF RIGHT ANKLE, UNSPECIFIED LIGAMENT, INITIAL ENCOUNTER: Primary | ICD-10-CM

## 2019-04-25 DIAGNOSIS — M25.571 PAIN IN JOINT INVOLVING ANKLE AND FOOT, RIGHT: ICD-10-CM

## 2019-04-25 PROCEDURE — G0463 HOSPITAL OUTPT CLINIC VISIT: HCPCS

## 2019-04-25 PROCEDURE — 99214 OFFICE O/P EST MOD 30 MIN: CPT | Performed by: NURSE PRACTITIONER

## 2019-04-25 PROCEDURE — G0463 HOSPITAL OUTPT CLINIC VISIT: HCPCS | Mod: 25

## 2019-04-25 PROCEDURE — 73610 X-RAY EXAM OF ANKLE: CPT | Mod: RT

## 2019-04-25 ASSESSMENT — ENCOUNTER SYMPTOMS
BACK PAIN: 0
JOINT SWELLING: 1
ARTHRALGIAS: 1
CONSTITUTIONAL NEGATIVE: 1

## 2019-04-25 ASSESSMENT — PAIN SCALES - GENERAL: PAINLEVEL: MODERATE PAIN (4)

## 2019-04-25 NOTE — PATIENT INSTRUCTIONS
Ice, rest and elevate.  Wear boot and use crutches until re-evaluated by PCP.  Call for an appointment and f/u in 7-10 days.       Patient Education     Understanding Ankle Sprain    The ankle is the joint where the leg and foot meet. Bones are held in place by connective tissue called ligaments. When ankle ligaments are stretched to the point of pain and injury, it is called an ankle sprain. A sprain can tear the ligaments. These tears can be very small but still cause pain. Ankle sprains can be mild or severe.  What causes an ankle sprain?  A sprain may occur when you twist your ankle or bend it too far. This can happen when you stumble or fall. Things that can make an ankle sprain more likely include:    Having had an ankle sprain before    Playing sports that involve running and jumping. Or playing contact sports such as football or hockey.    Wearing shoes that don t support your feet and ankles well    Having ankles with poor strength and flexibility  Symptoms of an ankle sprain  Symptoms may include:    Pain or soreness in the ankle    Swelling    Redness or bruising    Not being able to walk or put weight on the affected foot    Reduced range of motion in the ankle    A popping or tearing feeling at the time the sprain occurs    An abnormal or dislocated look to the ankle    Instability or too much range of motion in the ankle  Treatment for an ankle sprain  Treatment focuses on reducing pain and swelling, and avoiding further injury. Treatments may include:    Resting the ankle. Avoid putting weight on it. This may mean using crutches until the sprain heals.    Prescription or over-the-counter pain medicines. These help reduce swelling and pain.    Cold packs. These help reduce pain and swelling.    Raising your ankle above your heart. This helps reduce swelling.    Wrapping the ankle with an elastic bandage or ankle brace. This helps reduce swelling and gives some support to the ankle. In rare cases, you  may need a cast or boot.    Stretching and other exercises. These improve flexibility and strength.    Heat packs. These may be recommended before doing ankle exercises.  Possible complications of an ankle sprain  An ankle that has been weakened by a sprain can be more likely to have repeated sprains afterward. Doing exercises to strengthen your ankle and improve balance can reduce your risk for repeated sprains. Other possible complications are long-term (chronic) pain or an ankle that remains unstable.  When to call your healthcare provider  Call your healthcare provider right away if you have any of these:    Fever of 100.4 F (38 C) or higher, or as directed    Pain, numbness, discoloration, or coldness in the foot or toes    Pain that gets worse    Symptoms that don t get better, or get worse    New symptoms   Date Last Reviewed: 3/10/2016    4681-0971 [employer]. [address] [place] [address]. All rights reserved. This information is not intended as a substitute for professional medical care. Always follow your healthcare professional's instructions.

## 2019-04-25 NOTE — PROGRESS NOTES
SUBJECTIVE:   Yohana Robertson is a 28 year old female who presents to clinic today for the following health issues:    HPI  Presents with right ankle pain since last night. Jumped and rolled her ankle, felt a pop and developed pain. Pain was radiating up her leg, when she wrapped it she felt another pop and the pain reduced back to just the ankle. Has been using crutches, iced her ankle. No Tylenol of ibuprofen. Has had previous injuries in both ankles.     Patient Active Problem List    Diagnosis Date Noted     S/P laparoscopic hysterectomy 07/26/2018     Priority: Medium     Former smoker 07/03/2018     Priority: Medium     Asthma 02/11/2018     Priority: Medium     Attention deficit disorder 02/11/2018     Priority: Medium     Acute bilateral low back pain with sciatica 07/27/2017     Priority: Medium     Anxiety 01/09/2014     Priority: Medium     Severe episode of recurrent major depressive disorder (H) 12/26/2013     Priority: Medium     Nonallopathic lesion of lumbar region 08/08/2013     Priority: Medium     IBS (irritable bowel syndrome) 03/28/2013     Priority: Medium     Bunion, left foot 04/11/2012     Priority: Medium     Common migraine 04/20/2011     Priority: Medium     Past Medical History:   Diagnosis Date     Bunion of great toe of left foot     No Comments Provided     Contraceptive management     started on ALYSSIA     Endometriosis 07/2018    diagnosed laparoscopically      Past Surgical History:   Procedure Laterality Date     BUNIONECTOMY      4/2005,Left     COLONOSCOPY      7/2013,Normal     CYSTOSCOPY N/A 7/26/2018    Procedure: CYSTOSCOPY;;  Surgeon: Renate Ch MD;  Location:  OR     DENTAL SURGERY      wisdom teeth     FRACTURE SURGERY      6/13,L Foot-tarsometatarsal realignment arthrodesis with plate and screw fixation, capsule tendon balancing procedures about the first tarsometatarsal joint, Landry Gibson DPM  [facility]     hardware removal Left      7/26/2006,230611,REMOVAL OF FOREIGN BODY,Removal of harware L foot after bunionectomy [Other][[[[     LAPAROSCOPIC HYSTERECTOMY TOTAL, SALPINGECTOMY N/A 7/26/2018    Procedure: LAPAROSCOPIC HYSTERECTOMY TOTAL, SALPINGECTOMY;  Total Laparoscopic Hysterectomy & Bilateral Salpingectomy, Cystoscopy.;  Surgeon: Renate Ch MD;  Location: [facility] OR     TONSILLECTOMY      12/2005     Family History   Problem Relation Age of Onset     Heart Disease Mother         Heart Disease,Aortic stenosis and valve replaced     Family History Negative Father         Good Health     Asthma Maternal Grandmother         Asthma     Heart Disease Maternal Grandfather         Heart Disease     Social History     Tobacco Use     Smoking status: Current Every Day Smoker     Years: 9.00     Types: Cigarettes     Smokeless tobacco: Never Used     Tobacco comment: using e-cig   Substance Use Topics     Alcohol use: Yes     Alcohol/week: 12.6 oz     Comment: couple of brandys a night     Social History     Social History Narrative    One son-Rita Blandon  8/22/2010,, Devi Blandon    Homemaker at present time.     Current Outpatient Medications   Medication Sig Dispense Refill     ibuprofen (ADVIL/MOTRIN) 600 MG tablet Take 600 mg by mouth 4 times daily as needed for pain Max dose 3200 mg in 24 hrs       norgestimate-ethinyl estradiol (ORTHO-CYCLEN, SPRINTEC) 0.25-35 MG-MCG per tablet Take 1 tablet by mouth daily 84 tablet 2     Allergies   Allergen Reactions     Codeine Hives and Shortness Of Breath     Peanuts [Nuts] Shortness Of Breath     Hives       Review of Systems   Constitutional: Negative.    Musculoskeletal: Positive for arthralgias, gait problem and joint swelling. Negative for back pain.   Skin: Negative.         OBJECTIVE:     /82 (BP Location: Right arm, Patient Position: Sitting, Cuff Size: Adult Regular)   Pulse 71   Temp 98.7  F (37.1  C) (Tympanic)   Resp 18   LMP 07/01/2018   There is no height or weight on file to  calculate BMI.  Physical Exam   Constitutional: She is oriented to person, place, and time. She appears well-developed and well-nourished.   HENT:   Head: Normocephalic and atraumatic.   Eyes: Conjunctivae are normal. No scleral icterus.   Cardiovascular: Normal rate.   Pulmonary/Chest: Effort normal.   Musculoskeletal:        Right ankle: She exhibits decreased range of motion and swelling. She exhibits no ecchymosis, no deformity, no laceration and normal pulse. No lateral malleolus, no medial malleolus and no head of 5th metatarsal tenderness found. Achilles tendon normal.        Feet:    Neurological: She is alert and oriented to person, place, and time.   Skin: Skin is warm and dry.   Nursing note and vitals reviewed.      Diagnostic Test Results:  No results found for this or any previous visit (from the past 24 hour(s)).    ASSESSMENT/PLAN:       ICD-10-CM    1. Sprain of right ankle, unspecified ligament, initial encounter S93.401A CAST BOOT- WALKING   2. Pain in joint involving ankle and foot, right M25.571 XR Ankle Right G/E 3 Views     PLAN:  Abnormal XR, no obvious XR.  Due to significant symptoms, placed in a boot. Pt has her own crutches.  Advised to ice, rest and elevate.   Ibuprofen for pain PRN.   F/u with PCP in 7-10 days for re-evaluation.   Given Epic educational materials.    I explained my diagnostic considerations and recommendations to patient who voiced understanding and agreement with the treatment plan. All questions were answered.      Disclaimer:  This note consists of words and symbols derived from keyboarding, dictation, or using voice recognition software. As a result, there may be errors in the script that have gone undetected. Please consider this when interpreting information found in this note.      DALIA Curry, NP-C  4/25/2019 at 12:21 PM  [facility]

## 2019-04-25 NOTE — NURSING NOTE
"Chief Complaint   Patient presents with     Musculoskeletal Problem       Initial /82 (BP Location: Right arm, Patient Position: Sitting, Cuff Size: Adult Regular)   Pulse 71   Temp 98.7  F (37.1  C) (Tympanic)   Resp 18   LMP 07/01/2018  Estimated body mass index is 25.71 kg/m  as calculated from the following:    Height as of 8/20/18: 1.651 m (5' 5\").    Weight as of 9/7/18: 70.1 kg (154 lb 8 oz).  Medication Reconciliation: complete    Annita Bailey LPN  "

## 2019-05-08 ENCOUNTER — OFFICE VISIT (OUTPATIENT)
Dept: FAMILY MEDICINE | Facility: OTHER | Age: 29
End: 2019-05-08
Attending: FAMILY MEDICINE
Payer: COMMERCIAL

## 2019-05-08 VITALS
WEIGHT: 153.8 LBS | DIASTOLIC BLOOD PRESSURE: 84 MMHG | OXYGEN SATURATION: 100 % | SYSTOLIC BLOOD PRESSURE: 114 MMHG | TEMPERATURE: 97.3 F | HEART RATE: 67 BPM | BODY MASS INDEX: 25.59 KG/M2

## 2019-05-08 DIAGNOSIS — N76.0 BACTERIAL VAGINOSIS: ICD-10-CM

## 2019-05-08 DIAGNOSIS — B96.89 BACTERIAL VAGINOSIS: ICD-10-CM

## 2019-05-08 DIAGNOSIS — S93.401D SPRAIN OF RIGHT ANKLE, UNSPECIFIED LIGAMENT, SUBSEQUENT ENCOUNTER: Primary | ICD-10-CM

## 2019-05-08 PROCEDURE — G0463 HOSPITAL OUTPT CLINIC VISIT: HCPCS

## 2019-05-08 PROCEDURE — 99213 OFFICE O/P EST LOW 20 MIN: CPT | Performed by: FAMILY MEDICINE

## 2019-05-08 RX ORDER — METRONIDAZOLE 500 MG/1
500 TABLET ORAL 2 TIMES DAILY
Qty: 14 TABLET | Refills: 3 | Status: SHIPPED | OUTPATIENT
Start: 2019-05-08 | End: 2019-07-10

## 2019-05-08 ASSESSMENT — ASTHMA QUESTIONNAIRES
QUESTION_3 LAST FOUR WEEKS HOW OFTEN DID YOUR ASTHMA SYMPTOMS (WHEEZING, COUGHING, SHORTNESS OF BREATH, CHEST TIGHTNESS OR PAIN) WAKE YOU UP AT NIGHT OR EARLIER THAN USUAL IN THE MORNING: NOT AT ALL
QUESTION_2 LAST FOUR WEEKS HOW OFTEN HAVE YOU HAD SHORTNESS OF BREATH: ONCE OR TWICE A WEEK
QUESTION_4 LAST FOUR WEEKS HOW OFTEN HAVE YOU USED YOUR RESCUE INHALER OR NEBULIZER MEDICATION (SUCH AS ALBUTEROL): NOT AT ALL
ACT_TOTALSCORE: 24
QUESTION_1 LAST FOUR WEEKS HOW MUCH OF THE TIME DID YOUR ASTHMA KEEP YOU FROM GETTING AS MUCH DONE AT WORK, SCHOOL OR AT HOME: NONE OF THE TIME
ACUTE_EXACERBATION_TODAY: NO
QUESTION_5 LAST FOUR WEEKS HOW WOULD YOU RATE YOUR ASTHMA CONTROL: COMPLETELY CONTROLLED

## 2019-05-08 ASSESSMENT — PATIENT HEALTH QUESTIONNAIRE - PHQ9
5. POOR APPETITE OR OVEREATING: NEARLY EVERY DAY
SUM OF ALL RESPONSES TO PHQ QUESTIONS 1-9: 18

## 2019-05-08 ASSESSMENT — ANXIETY QUESTIONNAIRES
6. BECOMING EASILY ANNOYED OR IRRITABLE: NEARLY EVERY DAY
IF YOU CHECKED OFF ANY PROBLEMS ON THIS QUESTIONNAIRE, HOW DIFFICULT HAVE THESE PROBLEMS MADE IT FOR YOU TO DO YOUR WORK, TAKE CARE OF THINGS AT HOME, OR GET ALONG WITH OTHER PEOPLE: SOMEWHAT DIFFICULT
5. BEING SO RESTLESS THAT IT IS HARD TO SIT STILL: NEARLY EVERY DAY
3. WORRYING TOO MUCH ABOUT DIFFERENT THINGS: NEARLY EVERY DAY
2. NOT BEING ABLE TO STOP OR CONTROL WORRYING: NEARLY EVERY DAY
GAD7 TOTAL SCORE: 20
1. FEELING NERVOUS, ANXIOUS, OR ON EDGE: NEARLY EVERY DAY
7. FEELING AFRAID AS IF SOMETHING AWFUL MIGHT HAPPEN: MORE THAN HALF THE DAYS

## 2019-05-08 ASSESSMENT — PAIN SCALES - GENERAL: PAINLEVEL: MODERATE PAIN (5)

## 2019-05-08 NOTE — PROGRESS NOTES
"Nursing Notes:   Jessica Guerrero LPN  5/8/2019 11:31 AM  Signed  Chief Complaint   Patient presents with     RECHECK     right foot injury form the [facility]       Initial /84   Pulse 67   Temp 97.3  F (36.3  C) (Temporal)   Wt 69.8 kg (153 lb 12.8 oz)   LMP 07/01/2018   SpO2 100%   Breastfeeding? No   BMI 25.59 kg/m    Estimated body mass index is 25.59 kg/m  as calculated from the following:    Height as of 8/20/18: 1.651 m (5' 5\").    Weight as of this encounter: 69.8 kg (153 lb 12.8 oz).  Medication Reconciliation: complete    Jessica Guerrero LPN      SUBJECTIVE:  Yohana Robertson  is a 28 year old female who comes in today for follow-up of her right foot injury.  She was seen in urgent care 2 weeks ago and was felt to have a sprained ankle.  She apparently jumped and rolled her ankle and felt a pop with immediate onset of pain.  At the time of her visit, she was using crutches and icing her ankle and using an Ace wrap.  X-ray was negative for fracture.    She has a history of recurrent ankle sprains. It is some better. She is walking on it now.      She has recurrent vaginal symptoms.  She has a history of BV in the past.  Feels like similar symptoms.      She had hysterectomy in August.  She had endometriosis and has been on continuous OCPs since then and now wonders about stopping. The plan was 6 months, it has been 8.    Past Medical, Family, and Social History reviewed and updated as noted below.   ROS is negative except as noted above       Allergies   Allergen Reactions     Codeine Hives and Shortness Of Breath     Peanuts [Nuts] Shortness Of Breath     Hives   ,   Family History   Problem Relation Age of Onset     Heart Disease Mother         Heart Disease,Aortic stenosis and valve replaced     Family History Negative Father         Good Health     Asthma Maternal Grandmother         Asthma     Heart Disease Maternal Grandfather         Heart Disease   ,   Current Outpatient " Medications   Medication     ibuprofen (ADVIL/MOTRIN) 600 MG tablet     metroNIDAZOLE (FLAGYL) 500 MG tablet     norgestimate-ethinyl estradiol (ORTHO-CYCLEN, SPRINTEC) 0.25-35 MG-MCG per tablet     No current facility-administered medications for this visit.    ,   Past Medical History:   Diagnosis Date     Bunion of great toe of left foot     No Comments Provided     Contraceptive management     started on ALYSSIA     Endometriosis 07/2018    diagnosed laparoscopically   ,   Patient Active Problem List    Diagnosis Date Noted     S/P laparoscopic hysterectomy 07/26/2018     Priority: Medium     Former smoker 07/03/2018     Priority: Medium     Asthma 02/11/2018     Priority: Medium     Attention deficit disorder 02/11/2018     Priority: Medium     Acute bilateral low back pain with sciatica 07/27/2017     Priority: Medium     Anxiety 01/09/2014     Priority: Medium     Severe episode of recurrent major depressive disorder (H) 12/26/2013     Priority: Medium     Nonallopathic lesion of lumbar region 08/08/2013     Priority: Medium     IBS (irritable bowel syndrome) 03/28/2013     Priority: Medium     Bunion, left foot 04/11/2012     Priority: Medium     Common migraine 04/20/2011     Priority: Medium   ,   Past Surgical History:   Procedure Laterality Date     BUNIONECTOMY      4/2005,Left     COLONOSCOPY      7/2013,Normal     CYSTOSCOPY N/A 7/26/2018    Procedure: CYSTOSCOPY;;  Surgeon: Renate Ch MD;  Location:  OR     DENTAL SURGERY      wisdom teeth     FRACTURE SURGERY      6/13,L Foot-tarsometatarsal realignment arthrodesis with plate and screw fixation, capsule tendon balancing procedures about the first tarsometatarsal joint, Landry Gibson DPM  [facility]     hardware removal Left     7/26/2006,468474,REMOVAL OF FOREIGN BODY,Removal of harware L foot after bunionectomy [Other][[[[     LAPAROSCOPIC HYSTERECTOMY TOTAL, SALPINGECTOMY N/A 7/26/2018    Procedure: LAPAROSCOPIC HYSTERECTOMY  TOTAL, SALPINGECTOMY;  Total Laparoscopic Hysterectomy & Bilateral Salpingectomy, Cystoscopy.;  Surgeon: Renate Ch MD;  Location: [facility] OR     TONSILLECTOMY      12/2005    and   Social History     Tobacco Use     Smoking status: Current Every Day Smoker     Years: 9.00     Types: Cigarettes     Smokeless tobacco: Never Used     Tobacco comment: using e-cig   Substance Use Topics     Alcohol use: Yes     Alcohol/week: 12.6 oz     Comment: couple of brandys a night     OBJECTIVE:  /84   Pulse 67   Temp 97.3  F (36.3  C) (Temporal)   Wt 69.8 kg (153 lb 12.8 oz)   LMP 07/01/2018   SpO2 100%   Breastfeeding? No   BMI 25.59 kg/m     EXAM:  Alert and cooperative, no distress.  Examination of her  right ankle reveals minimal swelling.  Joint is stable.  Slight tenderness to palpation over the lateral malleolus.  ASSESSMENT/Plan :    Yohana was seen today for recheck.    Diagnoses and all orders for this visit:    Sprain of right ankle, unspecified ligament, subsequent encounter  -     PHYSICAL THERAPY REFERRAL; Future  -     ANKLE CONTROL ORTHOSIS, STIRRUP STYLE, RIGID,INCLUDES ANY TYPE INTERFACE    Bacterial vaginosis  -     metroNIDAZOLE (FLAGYL) 500 MG tablet; Take 1 tablet (500 mg) by mouth 2 times daily      I suspect that she had a grade 2 ankle sprain and is appropriately healing.  We will convert from a Cam walker boot to a Aircast ankle splint.  She is referred to physical therapy for evaluation and treatment.  Continue with ice and compression as well.  Use common sense as far as activities.    We will treat with metronidazole for BV.  Instructed on use.  If symptoms do not mikey, she will let us know.    Congratulated on smoking cessation.  Discussed how to wean off of her electronic cigarette.    A total of 15 minutes was spent with the patient, greater than 50% of the time was spent in counseling/discussion of the aforementioned concerns.     Oscar Robertson MD

## 2019-05-08 NOTE — NURSING NOTE
"Chief Complaint   Patient presents with     RECHECK     right foot injury form the [facility]       Initial /84   Pulse 67   Temp 97.3  F (36.3  C) (Temporal)   Wt 69.8 kg (153 lb 12.8 oz)   LMP 07/01/2018   SpO2 100%   Breastfeeding? No   BMI 25.59 kg/m   Estimated body mass index is 25.59 kg/m  as calculated from the following:    Height as of 8/20/18: 1.651 m (5' 5\").    Weight as of this encounter: 69.8 kg (153 lb 12.8 oz).  Medication Reconciliation: complete    Jessica Guerrero LPN  "

## 2019-05-09 ASSESSMENT — ASTHMA QUESTIONNAIRES: ACT_TOTALSCORE: 24

## 2019-05-09 ASSESSMENT — ANXIETY QUESTIONNAIRES: GAD7 TOTAL SCORE: 20

## 2019-05-22 ENCOUNTER — HOSPITAL ENCOUNTER (OUTPATIENT)
Dept: PHYSICAL THERAPY | Facility: OTHER | Age: 29
Setting detail: THERAPIES SERIES
End: 2019-05-22
Attending: FAMILY MEDICINE
Payer: COMMERCIAL

## 2019-05-22 DIAGNOSIS — S93.401D SPRAIN OF RIGHT ANKLE, UNSPECIFIED LIGAMENT, SUBSEQUENT ENCOUNTER: ICD-10-CM

## 2019-05-22 PROCEDURE — 97161 PT EVAL LOW COMPLEX 20 MIN: CPT | Mod: GP

## 2019-05-22 PROCEDURE — 97110 THERAPEUTIC EXERCISES: CPT | Mod: GP

## 2019-05-22 NOTE — PROGRESS NOTES
05/22/19 1000   General Information   Type of Visit Initial OP Ortho PT Evaluation   Start of Care Date 05/22/19   Referring Physician Dr. Robertson   Patient/Family Goals Statement To reduce her pain and get her ankle stronger.    Orders Evaluate and Treat   Date of Order 05/08/19   Certification Required? No   Medical Diagnosis Sprain of right ankle, unspecified ligament, subsequent encounter S93.401D    Surgical/Medical history reviewed Yes   Precautions/Limitations no known precautions/limitations   General Information Comments Patient is a 28 year old female referred to physical therapy with a right ankle sprain. She reports that she rolled her ankle about 6 weeks ago. She was hanging her hammock and notes that she was jumping for a rope. She landed on flat feet but then rolled her right ankle. States that she initially couldn't put weight on it and hear an audible pop sound. She typically has issues with her left ankle but not her right. Rates her pain at about 1-2/10. She reports that her pain has significantly improved since onset. She reports that she is more cautious with everything and it is slowing her down with all activities. She has two children at home and is taking more breaks throughout her day. Is no longer using the boot or the air splint she was given.        Present No   Body Part(s)   Body Part(s) Ankle/Foot   Presentation and Etiology   Pertinent history of current problem (include personal factors and/or comorbidities that impact the POC) Reports PMH of Asthma, depression and arthritis   Impairments A. Pain;C. Swelling;F. Decreased strength and endurance;G. Impaired balance;L. Tingling   Functional Limitations perform activities of daily living;perform desired leisure / sports activities   Symptom Location Right ankle, lateral portion   How/Where did it occur At home   Onset date of current episode/exacerbation 04/10/19  (Patient reports having his approximately 6  weeks ago)   Chronicity New   Pain rating (0-10 point scale) Best (/10);Worst (/10)   Best (/10) 1   Worst (/10) 5   Pain quality A. Sharp;C. Aching;B. Dull   Frequency of pain/symptoms A. Constant   Pain/symptoms are: Worse during the day   Pain/symptoms exacerbated by A. Sitting;B. Walking;C. Lifting;D. Carrying;E. Rest;F. Nothing;G. Certain positions;I. Bending;J. ADL;K. Home tasks;L. Work tasks   Pain/symptoms eased by E. Changing positions;G. Heat;H. Cold;J. Braces/supports   Progression of symptoms since onset: Improved   Prior Level of Function   Prior Level of Function-Mobility Independent   Prior Level of Function-ADLs Independent   Current Level of Function   Patient role/employment history E. Unemployed   Living environment House/townhome   Home/community accessibility Reports that she has stairs in the house and even though she can do them, they aren't normal quite yet.    Current equipment-Gait/Locomotion None   Current equipment-ADL None   Fall Risk Screen   Fall screen completed by PT   Have you fallen 2 or more times in the past year? No   Have you fallen and had an injury in the past year? Yes   Timed Up and Go score (seconds) 9 seconds   Is patient a fall risk? No   Ankle/Foot Objective Findings   Side (if bilateral, select both right and left) Right   Observation Patient restig comfortably in chair. No apparent distress.    Integumentary No significant findings   Posture Protracted shoulders   Gait/Locomotion Mild antalgic gait pattern with shorter step length on the left to offweight the right foot during weight bearing.    Foot Position In Standing Flat foot with noted pronation.    Anterior Drawer Test Negative   Posterior Drawer Test Negative   Ankle/Foot Special Tests Comments Circumference measurements: 50 cm   Right DF (Knee Ext) AROM -12 degrees short of neutral dorsiflexion   Right PF AROM 55 degrees   Right Calcanceal Inversion AROM 36 degrees   Right Calcaneal Eversion AROM 15 degrees    Right DF/Inversion Strength 4/5 secondary to pain   Right DF/Eversion Strength 4/5 secondary to pain   Right PF/Inversion Strength 5/5   Right PF/Eversion Strength 5/5   Right Gastroc (in WB) Flexibility Min limited    Planned Therapy Interventions   Planned Therapy Interventions balance training;gait training;joint mobilization;manual therapy;neuromuscular re-education;ROM;strengthening;stretching   Planned Modality Interventions   Planned Modality Interventions Cryotherapy;Hot packs;Ultrasound   Clinical Impression   Criteria for Skilled Therapeutic Interventions Met yes, treatment indicated   PT Diagnosis Impaired mobility, decreased strength and endurance, impaired gait   Influenced by the following impairments Pain, weakness, stiffness   Functional limitations due to impairments ambulation, transfers, running   Clinical Presentation Stable/Uncomplicated   Clinical Presentation Rationale Minimal reported comorbidities   Clinical Decision Making (Complexity) Low complexity   Therapy Frequency 2 times/Week   Predicted Duration of Therapy Intervention (days/wks) 8 weeks   Risk & Benefits of therapy have been explained Yes   Patient, Family & other staff in agreement with plan of care Yes   Clinical Impression Comments Signs and symptoms consistent with lateral ankle sprain. Anterior drawer test negative. Patient has minimal bruising at this time but does have mild swelling when compared to her left. She would benefit from skilled PT services in order to improve her mobility, strength, and endurance, while reducing her pain   Education Assessment   Barriers to Learning No barriers   ORTHO GOALS   PT Ortho Eval Goals 1;2;3;4   Ortho Goal 1   Goal Identifier HEP   Goal Description Patient will demonstrate compliance and independence with her HEP in order to promote ability to self manage her symptoms   Target Date 06/19/19   Ortho Goal 2   Goal Identifier Stairs   Goal Description Patient will be able to  ascend/descend 1 flight of stairs with no increase in right ankle pain in order to improve her mobility around the home and community   Target Date 06/19/19   Ortho Goal 3   Goal Identifier House work   Goal Description Patient will be able to complete all house related duties, such as sweeping and mopping, with no increase in right ankle pain in order to return to prior level of function   Target Date 07/17/19   Ortho Goal 4   Goal Identifier Strength   Goal Description Patient will be able to squat/bend down to the floor to lift a bag of groceries with no increase in pain in the right ankle in order to improve her mobility around the home   Target Date 07/17/19   Total Evaluation Time   PT Eval, Low Complexity Minutes (05484) 72

## 2019-05-24 ENCOUNTER — HOSPITAL ENCOUNTER (OUTPATIENT)
Dept: PHYSICAL THERAPY | Facility: OTHER | Age: 29
Setting detail: THERAPIES SERIES
End: 2019-05-24
Attending: FAMILY MEDICINE
Payer: COMMERCIAL

## 2019-05-24 PROCEDURE — 97016 VASOPNEUMATIC DEVICE THERAPY: CPT | Mod: GP

## 2019-05-24 PROCEDURE — 97110 THERAPEUTIC EXERCISES: CPT | Mod: GP

## 2019-05-29 ENCOUNTER — HOSPITAL ENCOUNTER (OUTPATIENT)
Dept: PHYSICAL THERAPY | Facility: OTHER | Age: 29
Setting detail: THERAPIES SERIES
End: 2019-05-29
Attending: FAMILY MEDICINE
Payer: COMMERCIAL

## 2019-05-29 PROCEDURE — 97110 THERAPEUTIC EXERCISES: CPT | Mod: GP

## 2019-05-29 PROCEDURE — 97016 VASOPNEUMATIC DEVICE THERAPY: CPT | Mod: GP

## 2019-05-31 ENCOUNTER — HOSPITAL ENCOUNTER (OUTPATIENT)
Dept: PHYSICAL THERAPY | Facility: OTHER | Age: 29
Setting detail: THERAPIES SERIES
End: 2019-05-31
Attending: FAMILY MEDICINE
Payer: COMMERCIAL

## 2019-05-31 PROCEDURE — 97110 THERAPEUTIC EXERCISES: CPT | Mod: GP

## 2019-06-05 ENCOUNTER — HOSPITAL ENCOUNTER (OUTPATIENT)
Dept: PHYSICAL THERAPY | Facility: OTHER | Age: 29
Setting detail: THERAPIES SERIES
End: 2019-06-05
Attending: FAMILY MEDICINE
Payer: COMMERCIAL

## 2019-06-05 PROCEDURE — 97016 VASOPNEUMATIC DEVICE THERAPY: CPT | Mod: GP

## 2019-06-05 PROCEDURE — 97110 THERAPEUTIC EXERCISES: CPT | Mod: GP

## 2019-06-11 ENCOUNTER — HOSPITAL ENCOUNTER (OUTPATIENT)
Dept: PHYSICAL THERAPY | Facility: OTHER | Age: 29
Setting detail: THERAPIES SERIES
End: 2019-06-11
Attending: FAMILY MEDICINE
Payer: COMMERCIAL

## 2019-06-11 PROCEDURE — 97110 THERAPEUTIC EXERCISES: CPT | Mod: GP

## 2019-06-11 NOTE — PROGRESS NOTES
Outpatient Physical Therapy Discharge Note     Patient: Yohana Robertson  : 1990    Beginning/End Dates of Reporting Period:  2019 to 2019    Referring Provider: Dr. Robertson    Therapy Diagnosis: Impaired mobility, decreased strength and endurance, impaired gait     Client Self Report: Patient is doing better today. States that she has very little pain in the ankle, if any. She did plenty of activity this weekend with walking and activity outside without as much pain. She is ready for discharge today stating that she has significantly improved since her first visit.     Objective Measurements:  Objective Measure: Subjective pain rating  Details: 0/10    Goals:  Goal Identifier HEP   Goal Description Patient will demonstrate compliance and independence with her HEP in order to promote ability to self manage her symptoms   Target Date 19   Date Met  19   Progress:     Goal Identifier Stairs   Goal Description Patient will be able to ascend/descend 1 flight of stairs with no increase in right ankle pain in order to improve her mobility around the home and community   Target Date 19   Date Met  19   Progress:     Goal Identifier House work   Goal Description Patient will be able to complete all house related duties, such as sweeping and mopping, with no increase in right ankle pain in order to return to prior level of function   Target Date 19   Date Met  19   Progress:     Goal Identifier Strength   Goal Description Patient will be able to squat/bend down to the floor to lift a bag of groceries with no increase in pain in the right ankle in order to improve her mobility around the home   Target Date 19   Date Met  19   Progress:     Progress Toward Goals:   Progress this reporting period: Patient has significantly progressed towards her goals. She is tolerating much more indoor and outdoor activity without an increase in pain. She is ambulating on  uneven ground and inclines/declines without much difficulty. Can still become sore with more complex activities however this has improved. She is ready for discharge at this time with knowing she should continue her HEP    Plan:  Discharge from therapy.    Discharge:    Reason for Discharge: Patient has met all goals. She is comfortable with independent management of her symptoms    Equipment Issued: none    Discharge Plan: Patient to continue home program.

## 2019-07-10 ENCOUNTER — HOSPITAL ENCOUNTER (EMERGENCY)
Facility: OTHER | Age: 29
Discharge: HOME | End: 2019-07-10
Attending: PHYSICIAN ASSISTANT | Admitting: PHYSICIAN ASSISTANT
Payer: COMMERCIAL

## 2019-07-10 ENCOUNTER — APPOINTMENT (OUTPATIENT)
Dept: GENERAL RADIOLOGY | Facility: OTHER | Age: 29
End: 2019-07-10
Attending: PHYSICIAN ASSISTANT
Payer: COMMERCIAL

## 2019-07-10 VITALS
HEART RATE: 50 BPM | SYSTOLIC BLOOD PRESSURE: 134 MMHG | RESPIRATION RATE: 19 BRPM | DIASTOLIC BLOOD PRESSURE: 77 MMHG | BODY MASS INDEX: 25.49 KG/M2 | TEMPERATURE: 98.6 F | HEIGHT: 65 IN | OXYGEN SATURATION: 97 % | WEIGHT: 153 LBS

## 2019-07-10 DIAGNOSIS — K21.9 GASTROESOPHAGEAL REFLUX DISEASE: ICD-10-CM

## 2019-07-10 DIAGNOSIS — R12 HEART BURN: ICD-10-CM

## 2019-07-10 DIAGNOSIS — R07.89 ATYPICAL CHEST PAIN: ICD-10-CM

## 2019-07-10 LAB
ALBUMIN SERPL-MCNC: 4.4 G/DL (ref 3.5–5.7)
ALP SERPL-CCNC: 58 U/L (ref 34–104)
ALT SERPL W P-5'-P-CCNC: 13 U/L (ref 7–52)
ANION GAP SERPL CALCULATED.3IONS-SCNC: 8 MMOL/L (ref 3–14)
AST SERPL W P-5'-P-CCNC: 20 U/L (ref 13–39)
BASOPHILS # BLD AUTO: 0.1 10E9/L (ref 0–0.2)
BASOPHILS NFR BLD AUTO: 0.6 %
BILIRUB SERPL-MCNC: 0.6 MG/DL (ref 0.3–1)
BUN SERPL-MCNC: 8 MG/DL (ref 7–25)
CALCIUM SERPL-MCNC: 9.1 MG/DL (ref 8.6–10.3)
CHLORIDE SERPL-SCNC: 101 MMOL/L (ref 98–107)
CO2 SERPL-SCNC: 27 MMOL/L (ref 21–31)
CREAT SERPL-MCNC: 0.83 MG/DL (ref 0.6–1.2)
D DIMER PPP DDU-MCNC: <200 NG/ML D-DU (ref 0–230)
DIFFERENTIAL METHOD BLD: NORMAL
EOSINOPHIL # BLD AUTO: 0.1 10E9/L (ref 0–0.7)
EOSINOPHIL NFR BLD AUTO: 0.8 %
ERYTHROCYTE [DISTWIDTH] IN BLOOD BY AUTOMATED COUNT: 12.7 % (ref 10–15)
GFR SERPL CREATININE-BSD FRML MDRD: 81 ML/MIN/{1.73_M2}
GLUCOSE SERPL-MCNC: 77 MG/DL (ref 70–105)
HCT VFR BLD AUTO: 41.4 % (ref 35–47)
HGB BLD-MCNC: 14.5 G/DL (ref 11.7–15.7)
IMM GRANULOCYTES # BLD: 0 10E9/L (ref 0–0.4)
IMM GRANULOCYTES NFR BLD: 0.4 %
INR PPP: 1.12 (ref 0–1.3)
LACTATE SERPL-SCNC: 0.7 MMOL/L (ref 0.5–2.2)
LIPASE SERPL-CCNC: 5 U/L (ref 11–82)
LYMPHOCYTES # BLD AUTO: 2.1 10E9/L (ref 0.8–5.3)
LYMPHOCYTES NFR BLD AUTO: 20.9 %
MCH RBC QN AUTO: 32.1 PG (ref 26.5–33)
MCHC RBC AUTO-ENTMCNC: 35 G/DL (ref 31.5–36.5)
MCV RBC AUTO: 92 FL (ref 78–100)
MONOCYTES # BLD AUTO: 0.7 10E9/L (ref 0–1.3)
MONOCYTES NFR BLD AUTO: 7.4 %
NEUTROPHILS # BLD AUTO: 6.9 10E9/L (ref 1.6–8.3)
NEUTROPHILS NFR BLD AUTO: 69.9 %
PLATELET # BLD AUTO: 190 10E9/L (ref 150–450)
POTASSIUM SERPL-SCNC: 3.3 MMOL/L (ref 3.5–5.1)
PROT SERPL-MCNC: 6.8 G/DL (ref 6.4–8.9)
RBC # BLD AUTO: 4.52 10E12/L (ref 3.8–5.2)
SODIUM SERPL-SCNC: 136 MMOL/L (ref 134–144)
TROPONIN I SERPL-MCNC: <0.03 UG/L (ref 0–0.03)
TROPONIN I SERPL-MCNC: <0.03 UG/L (ref 0–0.03)
TSH SERPL DL<=0.05 MIU/L-ACNC: 4.81 IU/ML (ref 0.34–5.6)
WBC # BLD AUTO: 9.9 10E9/L (ref 4–11)

## 2019-07-10 PROCEDURE — 84443 ASSAY THYROID STIM HORMONE: CPT | Performed by: PHYSICIAN ASSISTANT

## 2019-07-10 PROCEDURE — 83605 ASSAY OF LACTIC ACID: CPT | Performed by: PHYSICIAN ASSISTANT

## 2019-07-10 PROCEDURE — 25000132 ZZH RX MED GY IP 250 OP 250 PS 637: Performed by: PHYSICIAN ASSISTANT

## 2019-07-10 PROCEDURE — 25000128 H RX IP 250 OP 636: Performed by: PHYSICIAN ASSISTANT

## 2019-07-10 PROCEDURE — 85379 FIBRIN DEGRADATION QUANT: CPT | Performed by: PHYSICIAN ASSISTANT

## 2019-07-10 PROCEDURE — 36415 COLL VENOUS BLD VENIPUNCTURE: CPT | Performed by: PHYSICIAN ASSISTANT

## 2019-07-10 PROCEDURE — 99284 EMERGENCY DEPT VISIT MOD MDM: CPT | Mod: Z6 | Performed by: PHYSICIAN ASSISTANT

## 2019-07-10 PROCEDURE — 80053 COMPREHEN METABOLIC PANEL: CPT | Performed by: PHYSICIAN ASSISTANT

## 2019-07-10 PROCEDURE — 96375 TX/PRO/DX INJ NEW DRUG ADDON: CPT | Performed by: PHYSICIAN ASSISTANT

## 2019-07-10 PROCEDURE — 93005 ELECTROCARDIOGRAM TRACING: CPT | Performed by: PHYSICIAN ASSISTANT

## 2019-07-10 PROCEDURE — 93010 ELECTROCARDIOGRAM REPORT: CPT | Performed by: INTERNAL MEDICINE

## 2019-07-10 PROCEDURE — 83690 ASSAY OF LIPASE: CPT | Performed by: PHYSICIAN ASSISTANT

## 2019-07-10 PROCEDURE — 84484 ASSAY OF TROPONIN QUANT: CPT | Performed by: PHYSICIAN ASSISTANT

## 2019-07-10 PROCEDURE — 99285 EMERGENCY DEPT VISIT HI MDM: CPT | Mod: 25 | Performed by: PHYSICIAN ASSISTANT

## 2019-07-10 PROCEDURE — 96374 THER/PROPH/DIAG INJ IV PUSH: CPT | Performed by: PHYSICIAN ASSISTANT

## 2019-07-10 PROCEDURE — 71046 X-RAY EXAM CHEST 2 VIEWS: CPT

## 2019-07-10 PROCEDURE — 85025 COMPLETE CBC W/AUTO DIFF WBC: CPT | Performed by: PHYSICIAN ASSISTANT

## 2019-07-10 PROCEDURE — 85610 PROTHROMBIN TIME: CPT | Performed by: PHYSICIAN ASSISTANT

## 2019-07-10 PROCEDURE — C9113 INJ PANTOPRAZOLE SODIUM, VIA: HCPCS | Performed by: PHYSICIAN ASSISTANT

## 2019-07-10 PROCEDURE — 25000125 ZZHC RX 250: Performed by: PHYSICIAN ASSISTANT

## 2019-07-10 RX ORDER — ALUMINA, MAGNESIA, AND SIMETHICONE 2400; 2400; 240 MG/30ML; MG/30ML; MG/30ML
15 SUSPENSION ORAL ONCE
Status: COMPLETED | OUTPATIENT
Start: 2019-07-10 | End: 2019-07-10

## 2019-07-10 RX ORDER — LIDOCAINE HYDROCHLORIDE 20 MG/ML
15 SOLUTION OROPHARYNGEAL ONCE
Status: COMPLETED | OUTPATIENT
Start: 2019-07-10 | End: 2019-07-10

## 2019-07-10 RX ORDER — ONDANSETRON 2 MG/ML
4 INJECTION INTRAMUSCULAR; INTRAVENOUS EVERY 30 MIN PRN
Status: DISCONTINUED | OUTPATIENT
Start: 2019-07-10 | End: 2019-07-10 | Stop reason: HOSPADM

## 2019-07-10 RX ORDER — ASPIRIN 81 MG/1
324 TABLET, CHEWABLE ORAL ONCE
Status: COMPLETED | OUTPATIENT
Start: 2019-07-10 | End: 2019-07-10

## 2019-07-10 RX ORDER — SUCRALFATE 1 G/1
1 TABLET ORAL 2 TIMES DAILY
Qty: 60 TABLET | Refills: 0 | Status: SHIPPED | OUTPATIENT
Start: 2019-07-10 | End: 2020-01-22

## 2019-07-10 RX ORDER — SIMETHICONE 125 MG
1 CAPSULE ORAL
Qty: 60 CAPSULE | Refills: 0 | Status: SHIPPED | OUTPATIENT
Start: 2019-07-10 | End: 2020-01-22

## 2019-07-10 RX ORDER — SODIUM CHLORIDE 9 MG/ML
1000 INJECTION, SOLUTION INTRAVENOUS CONTINUOUS
Status: DISCONTINUED | OUTPATIENT
Start: 2019-07-10 | End: 2019-07-10 | Stop reason: HOSPADM

## 2019-07-10 RX ORDER — SUCRALFATE 1 G/1
1 TABLET ORAL ONCE
Status: COMPLETED | OUTPATIENT
Start: 2019-07-10 | End: 2019-07-10

## 2019-07-10 RX ADMIN — SUCRALFATE 1 G: 1 TABLET ORAL at 19:36

## 2019-07-10 RX ADMIN — ALUMINUM HYDROXIDE, MAGNESIUM HYDROXIDE, AND DIMETHICONE 15 ML: 400; 400; 40 SUSPENSION ORAL at 19:35

## 2019-07-10 RX ADMIN — ONDANSETRON 4 MG: 2 INJECTION INTRAMUSCULAR; INTRAVENOUS at 19:03

## 2019-07-10 RX ADMIN — PANTOPRAZOLE SODIUM 40 MG: 40 INJECTION, POWDER, FOR SOLUTION INTRAVENOUS at 19:03

## 2019-07-10 RX ADMIN — LIDOCAINE HYDROCHLORIDE 15 ML: 20 SOLUTION ORAL; TOPICAL at 19:35

## 2019-07-10 RX ADMIN — SODIUM CHLORIDE 1000 ML: 9 INJECTION, SOLUTION INTRAVENOUS at 19:03

## 2019-07-10 RX ADMIN — ASPIRIN 81 MG 324 MG: 81 TABLET ORAL at 19:02

## 2019-07-10 ASSESSMENT — ENCOUNTER SYMPTOMS
BRUISES/BLEEDS EASILY: 0
DIZZINESS: 0
WOUND: 0
SHORTNESS OF BREATH: 0
LIGHT-HEADEDNESS: 0
TROUBLE SWALLOWING: 0
VOMITING: 0
SEIZURES: 0
SORE THROAT: 0
FACIAL ASYMMETRY: 0
ADENOPATHY: 0
NAUSEA: 0
DIARRHEA: 0
FACIAL SWELLING: 0
BACK PAIN: 0
WHEEZING: 0
FEVER: 0
HEMATURIA: 0
CHEST TIGHTNESS: 1
CHILLS: 0
ABDOMINAL PAIN: 0
HEADACHES: 0
CONFUSION: 0
CONSTIPATION: 0

## 2019-07-10 ASSESSMENT — MIFFLIN-ST. JEOR: SCORE: 1419.88

## 2019-07-10 NOTE — ED NOTES
12 lead performed results given to Dr. Arthur and brief report given to MD. MD reports pt is stable to go out to waiting room.    Laxmi Miner RN on 7/10/2019 at 5:03 PM

## 2019-07-10 NOTE — ED TRIAGE NOTES
Pt arrives to ED after having chest pain which developed several hours ago. Pt reports the pain is sharp and pressure. Pt reports multiple incidents this past week of having this type of chest pain but it has resolved on its own until today. Pt denies pain to palpation but reports pain with deep breath. Pt was nauseated and then she ate a meal and that is when the pain started. Pain is located in left upper chest left arm and neck.    Laxmi Miner RN on 7/10/2019 at 4:50 PM

## 2019-07-10 NOTE — ED AVS SNAPSHOT
[facility]  [address]  [place] [address]  Phone:  [phone]  Fax:  [phone]                                    Yohana Robertson   MRN: [id number]    Department:  [facility]   Date of Visit:  7/10/2019           After Visit Summary Signature Page    I have received my discharge instructions, and my questions have been answered. I have discussed any challenges I see with this plan with the nurse or doctor.    ..........................................................................................................................................  Patient/Patient Representative Signature      ..........................................................................................................................................  Patient Representative Print Name and Relationship to Patient    ..................................................               ................................................  Date                                   Time    ..........................................................................................................................................  Reviewed by Signature/Title    ...................................................              ..............................................  Date                                               Time          22EPIC Rev 08/18

## 2019-07-10 NOTE — ED PROVIDER NOTES
History     Chief Complaint   Patient presents with     Chest Pain     This is a 27 yo female who has been having increased chest pain over the past week.  It has sort of waxed and waned but always seemed to resolve on its own.  At times it comes on after eating and sometimes at rest.  Sometimes with nausea but no emesis.  She has pleuritic pain ast times.  Today her C/P started shortly after eating a few hours ago.  She was nauseated and has radiation to her left chest wall as well as her left neck area.  Denies any fever or chills.  No LH or dizziness. No cough or sore throat.  No diarrhea or constipation.             Allergies:  Allergies   Allergen Reactions     Codeine Hives and Shortness Of Breath     Peanuts [Nuts] Shortness Of Breath     Hives       Problem List:    Patient Active Problem List    Diagnosis Date Noted     S/P laparoscopic hysterectomy 07/26/2018     Priority: Medium     Former smoker 07/03/2018     Priority: Medium     Asthma 02/11/2018     Priority: Medium     Attention deficit disorder 02/11/2018     Priority: Medium     Acute bilateral low back pain with sciatica 07/27/2017     Priority: Medium     Anxiety 01/09/2014     Priority: Medium     Severe episode of recurrent major depressive disorder (H) 12/26/2013     Priority: Medium     Nonallopathic lesion of lumbar region 08/08/2013     Priority: Medium     IBS (irritable bowel syndrome) 03/28/2013     Priority: Medium     Bunion, left foot 04/11/2012     Priority: Medium     Common migraine 04/20/2011     Priority: Medium        Past Medical History:    Past Medical History:   Diagnosis Date     Bunion of great toe of left foot      Contraceptive management      Endometriosis 07/2018       Past Surgical History:    Past Surgical History:   Procedure Laterality Date     BUNIONECTOMY      4/2005,Left     COLONOSCOPY      7/2013,Normal     CYSTOSCOPY N/A 7/26/2018    Procedure: CYSTOSCOPY;;  Surgeon: Renate Ch MD;  Location:  OR      DENTAL SURGERY      wisdom teeth     FRACTURE SURGERY      6/13,L Foot-tarsometatarsal realignment arthrodesis with plate and screw fixation, capsule tendon balancing procedures about the first tarsometatarsal joint, Landry Gibson DPM  [facility]     hardware removal Left     7/26/2006,862915,REMOVAL OF FOREIGN BODY,Removal of harware L foot after bunionectomy [Other][[[[     LAPAROSCOPIC HYSTERECTOMY TOTAL, SALPINGECTOMY N/A 7/26/2018    Procedure: LAPAROSCOPIC HYSTERECTOMY TOTAL, SALPINGECTOMY;  Total Laparoscopic Hysterectomy & Bilateral Salpingectomy, Cystoscopy.;  Surgeon: Renate Ch MD;  Location:  OR     TONSILLECTOMY      12/2005       Family History:    Family History   Problem Relation Age of Onset     Heart Disease Mother         Heart Disease,Aortic stenosis and valve replaced     Family History Negative Father         Good Health     Asthma Maternal Grandmother         Asthma     Heart Disease Maternal Grandfather         Heart Disease       Social History:  Marital Status:  Single [1]  Social History     Tobacco Use     Smoking status: Current Every Day Smoker     Years: 9.00     Types: Cigarettes     Smokeless tobacco: Never Used     Tobacco comment: using e-cig   Substance Use Topics     Alcohol use: Yes     Alcohol/week: 12.6 oz     Comment: couple of brandys a night     Drug use: No     Comment: Drug use: No        Medications:      ibuprofen (ADVIL/MOTRIN) 600 MG tablet         Review of Systems   Constitutional: Negative for chills and fever.   HENT: Negative for congestion, facial swelling, nosebleeds, sore throat and trouble swallowing.    Eyes: Negative for visual disturbance.   Respiratory: Positive for chest tightness. Negative for shortness of breath and wheezing.    Cardiovascular: Positive for chest pain.   Gastrointestinal: Negative for abdominal pain, constipation, diarrhea, nausea and vomiting.   Genitourinary: Negative for hematuria.   Musculoskeletal:  "Negative for back pain.   Skin: Negative for rash and wound.   Neurological: Negative for dizziness, seizures, syncope, facial asymmetry, light-headedness and headaches.   Hematological: Negative for adenopathy. Does not bruise/bleed easily.   Psychiatric/Behavioral: Negative for confusion.       Physical Exam   BP: 154/86  Pulse: 61  Temp: 98.7  F (37.1  C)  Resp: 20  Height: 165.1 cm (5' 5\")  Weight: 69.4 kg (153 lb)  SpO2: 99 %      Physical Exam   Constitutional: She is oriented to person, place, and time. She appears well-developed and well-nourished. No distress.   HENT:   Head: Normocephalic and atraumatic.   Eyes: Conjunctivae are normal. No scleral icterus.   Neck: Neck supple.   Cardiovascular: Normal rate and regular rhythm.   Pulmonary/Chest: Effort normal and breath sounds normal.   L:S clear.  SaO2 is   Abdominal: Soft. There is no tenderness.   Musculoskeletal: She exhibits no deformity.   Lymphadenopathy:     She has no cervical adenopathy.   Neurological: She is alert and oriented to person, place, and time.   Skin: Skin is warm and dry. Capillary refill takes less than 2 seconds. No rash noted. She is not diaphoretic.   Psychiatric: She has a normal mood and affect.       ED Course     ED Course as of Jul 10 2130   Wed Jul 10, 2019   2128 HCG qualitative urine (UPT)     Procedures            EKG shows Sinus Bradycardia.  Cannot R/O Anterior infarct, age undetermined.  HR 53.  No previous EKG for comparison    Results for orders placed or performed during the hospital encounter of 07/10/19 (from the past 24 hour(s))   CBC with platelets differential   Result Value Ref Range    WBC 9.9 4.0 - 11.0 10e9/L    RBC Count 4.52 3.8 - 5.2 10e12/L    Hemoglobin 14.5 11.7 - 15.7 g/dL    Hematocrit 41.4 35.0 - 47.0 %    MCV 92 78 - 100 fl    MCH 32.1 26.5 - 33.0 pg    MCHC 35.0 31.5 - 36.5 g/dL    RDW 12.7 10.0 - 15.0 %    Platelet Count 190 150 - 450 10e9/L    Diff Method Automated Method     % Neutrophils " 69.9 %    % Lymphocytes 20.9 %    % Monocytes 7.4 %    % Eosinophils 0.8 %    % Basophils 0.6 %    % Immature Granulocytes 0.4 %    Absolute Neutrophil 6.9 1.6 - 8.3 10e9/L    Absolute Lymphocytes 2.1 0.8 - 5.3 10e9/L    Absolute Monocytes 0.7 0.0 - 1.3 10e9/L    Absolute Eosinophils 0.1 0.0 - 0.7 10e9/L    Absolute Basophils 0.1 0.0 - 0.2 10e9/L    Abs Immature Granulocytes 0.0 0 - 0.4 10e9/L   D-Dimer (HI,GH)   Result Value Ref Range    D-Dimer ng/mL <200 0 - 230 ng/ml D-DU   INR   Result Value Ref Range    INR 1.12 0 - 1.3   Comprehensive metabolic panel   Result Value Ref Range    Sodium 136 134 - 144 mmol/L    Potassium 3.3 (L) 3.5 - 5.1 mmol/L    Chloride 101 98 - 107 mmol/L    Carbon Dioxide 27 21 - 31 mmol/L    Anion Gap 8 3 - 14 mmol/L    Glucose 77 70 - 105 mg/dL    Urea Nitrogen 8 7 - 25 mg/dL    Creatinine 0.83 0.60 - 1.20 mg/dL    GFR Estimate 81 >60 mL/min/[1.73_m2]    GFR Estimate If Black >90 >60 mL/min/[1.73_m2]    Calcium 9.1 8.6 - 10.3 mg/dL    Bilirubin Total 0.6 0.3 - 1.0 mg/dL    Albumin 4.4 3.5 - 5.7 g/dL    Protein Total 6.8 6.4 - 8.9 g/dL    Alkaline Phosphatase 58 34 - 104 U/L    ALT 13 7 - 52 U/L    AST 20 13 - 39 U/L   Lactic acid   Result Value Ref Range    Lactic Acid 0.7 0.5 - 2.2 mmol/L   Troponin I   Result Value Ref Range    Troponin I ES <0.030 0.000 - 0.034 ug/L   Lipase   Result Value Ref Range    Lipase 5 (L) 11 - 82 U/L   TSH Reflex GH   Result Value Ref Range    TSH Reflex 4.81 0.34 - 5.60 IU/mL   XR Chest 2 Views    Narrative    Procedure:XR CHEST 2 VW    Clinical history:Female, 28 years, chest pain    Technique: Two views are submitted.    Comparison: None    Findings: The cardiac silhouette is normal. The pulmonary vasculature  is normal.    The lungs are clear. Bony structures are unremarkable.      Impression    Impression:   No acute abnormality. No evidence of acute or active cardiopulmonary  disease.    THAD LORENZ MD   Troponin I (second draw)   Result Value Ref  Range    Troponin I ES <0.030 0.000 - 0.034 ug/L       Medications   0.9% sodium chloride BOLUS (0 mLs Intravenous Stopped 7/10/19 2017)     Followed by   sodium chloride 0.9% infusion (has no administration in time range)   ondansetron (ZOFRAN) injection 4 mg (4 mg Intravenous Given 7/10/19 1903)   aspirin (ASA) chewable tablet 324 mg (324 mg Oral Given 7/10/19 1902)   pantoprazole (PROTONIX) 40 mg IV push injection (40 mg Intravenous Given 7/10/19 1903)   sucralfate (CARAFATE) tablet 1 g (1 g Oral Given 7/10/19 1936)   alum & mag hydroxide-simethicone (MYLANTA ES/MAALOX  ES) suspension 15 mL (15 mLs Oral Given 7/10/19 1935)     And   lidocaine HCl (XYLOCAINE) 2 % solution 15 mL (15 mLs Mouth/Throat Given 7/10/19 1935)       Assessments & Plan (with Medical Decision Making)     I have reviewed the nursing notes.    I have reviewed the findings, diagnosis, plan and need for follow up with the patient.         Medication List      Started    omeprazole 20 MG DR capsule  Commonly known as:  priLOSEC  20 mg, Oral, DAILY     Simethicone 125 MG Caps  1 capsule, Oral, 4 TIMES DAILY WITH MEALS & NIGHTLY     sucralfate 1 GM tablet  Commonly known as:  CARAFATE  1 g, Oral, 2 TIMES DAILY            Final diagnoses:   Atypical chest pain   Gastroesophageal reflux disease   Heart burn     Afebrile.  Vital signs stable.  Patient with waxing and waning chest pain sometimes worse after eating.  Sometimes at rest.  Today with chest pain gradually worsening and left chest wall pain as well as left neck pain.  IV established and she was given fluids, aspirin, Zofran and Protonix initially.   EKG shows Sinus Bradycardia.  Cannot R/O Anterior infarct, age undetermined.  HR 53.  No previous EKG for comparison.  Troponin is normal.  D-dimer is normal.  This is reassuring with no signs of ACS.  She was given a GI cocktail as well as Carafate.  CBC is unremarkable.  Lactic acid is normal.  CMP shows only minimal decrease of potassium at  3.3 but otherwise unremarked.  Her TSH is normal.  INR is normal.  Lipase is normal.  Chest x-ray is unremarkable.  Her 90-minute troponin returns normal as well.  This is very reassuring.  She is feeling much better after the above treatment.  Atypical chest pain, GERD, heartburn.  I discussed continued monitoring and close follow-up and she will arrange this.  Rx in the short-term for Prilosec, Carafate and simethicone capsules.  Follow-up sooner if there is any other concerns problems or questions.      7/10/2019   [facility] [place]     Shan Mcgee PA-C  07/10/19 [id number]

## 2019-07-11 NOTE — ED NOTES
Patient states she is feeling better and would like to go home. Marifer Bob RN on 7/10/2019 at 8:45 PM

## 2019-07-11 NOTE — ED NOTES
Patient states pain has almost completely resolved. Provider updated. Marifer Bob RN on 7/10/2019 at 8:04 PM

## 2019-08-16 NOTE — PROGRESS NOTES
"Nursing Notes:   Jessica Guerrero LPN  7/3/2018  9:44 AM  Signed  Date of Surgery: 07/26/2018  Type of Surgery: Lap hyst  Surgeon: Dr Ch  Hospital:  [facility]    Fever/Chills or other infectious symptoms in past month: no  >10lb weight loss in past two months: no    Health Care Directive/Code status:  no  Hx of blood transfusions:   no   Td up to date:  yes  History of VRE/MRSA:  no     Preoperative Evaluation: Obstructive Sleep Apnea screening    S:Snore -  Do you snore loudly? (louder than talking or loud enough to be heard through closed doors)no  T: Tired - Do you often feel tired, fatigued, or sleepy during the daytime?yes  O: Observed - Has anyone ever observed you stop breathing during your sleep?no  P: Pressure - Do you have or are you being treated for high blood pressure?no  B: BMI - BMI greater than 35kg/m2?no  A: Age - Age over 50 years old?no  N: Neck - Neck circumference greater than 40 cm?no  G: Gender - Gender: Male?no    Total number of \"YES\" responses:  1    Scoring: Low risk of OZIEL 0-2  At Risk of OZIEL: >3 High Risk of OZIEL: 5-8    Jessica Guerrero LPN..................7/3/2018   9:39 AM          ----------------- PREOPERATIVE EXAM ------------------  7/3/2018    SUBJECTIVE:  Yohana Robertson is a 27 year old female here for preoperative optimization.    Preoperative risk assessment consultation was requested on Yohana Robertson by JOE Ch MD prior to lap hysterectomy.   This is scheduled for July 26, 2018. I am asked to see this patient for preoperative clearance prior to this procedure.     She a former smoker.    Patient Active Problem List    Diagnosis Date Noted     Former smoker 07/03/2018     Priority: Medium     Asthma 02/11/2018     Priority: Medium     Attention deficit disorder 02/11/2018     Priority: Medium     Acute bilateral low back pain with sciatica 07/27/2017     Priority: Medium     Anxiety 01/09/2014     Priority: Medium     Severe episode of recurrent major " depressive disorder (H) 12/26/2013     Priority: Medium     Nonallopathic lesion of lumbar region 08/08/2013     Priority: Medium     IBS (irritable bowel syndrome) 03/28/2013     Priority: Medium     Bunion, left foot 04/11/2012     Priority: Medium     Common migraine 04/20/2011     Priority: Medium       Past Medical History:   Diagnosis Date     Bunion of great toe of left foot     No Comments Provided     Contraceptive management     started on ALYSSIA       Past Surgical History:   Procedure Laterality Date     BUNIONECTOMY      4/2005,Left     COLONOSCOPY      7/2013,Normal     FRACTURE SURGERY      6/13,L Foot-tarsometatarsal realignment arthrodesis with plate and screw fixation, capsule tendon balancing procedures about the first tarsometatarsal joint, Landry Gibson DPM  [facility]     OTHER SURGICAL HISTORY      7/26/2006,[id number],REMOVAL OF FOREIGN BODY,Removal of harware L foot after bunionectomy [Other][[[[     TONSILLECTOMY      12/2005       Family History   Problem Relation Age of Onset     HEART DISEASE Mother      Heart Disease,Aortic stenosis and valve replaced     Family History Negative Father      Good Health     Asthma Maternal Grandmother      Asthma     HEART DISEASE Maternal Grandfather      Heart Disease       Social History   Substance Use Topics     Smoking status: Former Smoker     Packs/day: 0.50     Years: 9.00     Types: Cigarettes     Smokeless tobacco: Never Used      Comment: using e-cig     Alcohol use 12.6 oz/week      Comment: couple of brandys a night       Current Outpatient Prescriptions   Medication Sig Dispense Refill     ibuprofen (ADVIL/MOTRIN) 600 MG tablet Take 600 mg by mouth 4 times daily as needed for pain Max dose 3200 mg in 24 hrs         Allergies:  Allergies   Allergen Reactions     Codeine Hives and Shortness Of Breath     Peanuts  [Nuts] Shortness Of Breath       ROS:    Surgical:  patient denies previous complications from prior surgeries including but  "not limited to prolonged bleeding, anesthesia complications, dysrhythmias, surgical wound infections, or prolonged hospital stay.    Denies family hx of bleeding tendencies, anesthesia complications, or other problems with surgery.     -------------------------------------------------------------    PHYSICAL EXAM:  BP 94/62 (BP Location: Right arm, Patient Position: Chair, Cuff Size: Adult Regular)  Pulse 66  Temp 97.5  F (36.4  C) (Tympanic)  Resp 16  Ht 5' 5.25\" (1.657 m)  Wt 156 lb 12.8 oz (71.1 kg)  LMP 07/01/2018  SpO2 99%  Breastfeeding? No  BMI 25.89 kg/m2    EXAM:  General Appearance: Pleasant, alert, appropriate appearance for age. No acute distress  Head Exam: Normal. Normocephalic, atraumatic.  Eyes: PERRL, EOMI  Ears: Normal TM's bilaterally. Normal auditory canals and external ears.   OroPharynx: Normal buccal mucosa. Normal pharynx.  Neck: Supple, no masses or nodes, no lymphadenopathy.  No thyromegaly.  Lungs: Normal chest wall and respirations. Clear to auscultation, no wheezes or crackles.  Cardiovascular: Regular rate and rhythm. S1, S2, no murmurs.  Gastrointestinal: Soft, nontender, no abnormal masses or organomegaly. BS normal   Musculoskeletal: No edema.  Skin: no concerning or new rashes.  Neurologic Exam: CN 2-12 grossly intact.  Normal gait.  normal gross motor movement, tone, and coordination. No tremor.  Psychiatric Exam: Alert and oriented, appropriate affect.      EKG: Not indicated  ---------------------------------------------------------------  LABS  Results for orders placed or performed in visit on 07/03/18   CBC with platelets differential   Result Value Ref Range    WBC 6.2 4.0 - 11.0 10e9/L    RBC Count 4.91 3.8 - 5.2 10e12/L    Hemoglobin 15.4 11.7 - 15.7 g/dL    Hematocrit 46.5 35.0 - 47.0 %    MCV 95 78 - 100 fl    MCH 31.4 26.5 - 33.0 pg    MCHC 33.1 31.5 - 36.5 g/dL    RDW 12.6 10.0 - 15.0 %    Platelet Count 212 150 - 450 10e9/L    Diff Method Automated Method     " % Neutrophils 63.2 %    % Lymphocytes 27.0 %    % Monocytes 7.4 %    % Eosinophils 1.3 %    % Basophils 0.8 %    % Immature Granulocytes 0.3 %    Absolute Neutrophil 3.9 1.6 - 8.3 10e9/L    Absolute Lymphocytes 1.7 0.8 - 5.3 10e9/L    Absolute Monocytes 0.5 0.0 - 1.3 10e9/L    Absolute Eosinophils 0.1 0.0 - 0.7 10e9/L    Absolute Basophils 0.1 0.0 - 0.2 10e9/L    Abs Immature Granulocytes 0.0 0 - 0.4 10e9/L   Comprehensive metabolic panel   Result Value Ref Range    Sodium 138 134 - 144 mmol/L    Potassium 3.9 3.5 - 5.1 mmol/L    Chloride 102 98 - 107 mmol/L    Carbon Dioxide 30 21 - 31 mmol/L    Anion Gap 6 3 - 14 mmol/L    Glucose 89 70 - 105 mg/dL    Urea Nitrogen 13 7 - 25 mg/dL    Creatinine 0.83 0.60 - 1.20 mg/dL    GFR Estimate 82 >60 mL/min/1.7m2    GFR Estimate If Black >90 >60 mL/min/1.7m2    Calcium 9.3 8.6 - 10.3 mg/dL    Bilirubin Total 0.6 0.3 - 1.0 mg/dL    Albumin 4.9 3.5 - 5.7 g/dL    Protein Total 7.2 6.4 - 8.9 g/dL    Alkaline Phosphatase 50 34 - 104 U/L    ALT 10 7 - 52 U/L    AST 15 13 - 39 U/L   Pregnancy Urine (HCG)   Result Value Ref Range    HCG Qual Urine Negative NEG^Negative       ASSESSEMENT AND PLAN:    Yohana was seen today for pre-op exam.    Diagnoses and all orders for this visit:    Preoperative examination  -     CBC with platelets differential  -     Comprehensive metabolic panel  -     Pregnancy Urine (HCG)    Excessive bleeding in premenopausal period    Former smoker    Mild intermittent asthma without complication        PRE OP RECOMMENDATIONS:  Patient is on chronic pain medications NO   Patient is on antiplatlet/anticoagulation medication NO  Other medications that need adjustment perioperatively NO    Other:  Patient was advised to call our office and the surgical services with any change in condition or new symptoms if they were to develop between today and their surgical date.  Especially any cardiopulmonary symptoms or symptoms concerning for an infection.    Oscar BUCK  Marcelo 7/3/2018        none

## 2019-09-23 DIAGNOSIS — Z23 NEED FOR INFLUENZA VACCINATION: Primary | ICD-10-CM

## 2019-10-12 DIAGNOSIS — N80.9 ENDOMETRIOSIS: ICD-10-CM

## 2019-10-14 RX ORDER — NORGESTIMATE AND ETHINYL ESTRADIOL 0.25-0.035
KIT ORAL
Qty: 84 TABLET | Refills: 2 | OUTPATIENT
Start: 2019-10-14

## 2019-10-14 NOTE — TELEPHONE ENCOUNTER
Patient was advised to use only for 6 months. Medication is no longer on active medication list. Refill refused.    Katerina Ludwig RN...................10/14/2019 10:51 AM

## 2019-10-22 DIAGNOSIS — N80.9 ENDOMETRIOSIS: Primary | ICD-10-CM

## 2019-10-22 RX ORDER — NORGESTIMATE AND ETHINYL ESTRADIOL 0.25-0.035
1 KIT ORAL DAILY
Qty: 84 TABLET | Refills: 0 | OUTPATIENT
Start: 2019-10-22

## 2019-10-22 NOTE — TELEPHONE ENCOUNTER
See last note. Patient no longer taking this medication. Refill refused.    Katerina Ludwig RN...................10/22/2019 8:52 AM

## 2019-10-28 ENCOUNTER — ALLIED HEALTH/NURSE VISIT (OUTPATIENT)
Dept: FAMILY MEDICINE | Facility: OTHER | Age: 29
End: 2019-10-28
Payer: COMMERCIAL

## 2019-10-28 DIAGNOSIS — Z23 NEED FOR PROPHYLACTIC VACCINATION AND INOCULATION AGAINST INFLUENZA: Primary | ICD-10-CM

## 2019-10-28 PROCEDURE — 90686 IIV4 VACC NO PRSV 0.5 ML IM: CPT

## 2019-10-28 PROCEDURE — 90471 IMMUNIZATION ADMIN: CPT

## 2019-10-28 NOTE — PROGRESS NOTES
Influenza Vaccination Documentation  Verified patient's first and last name, and . Patient stated reason for visit today is to receive Flu vaccine. Patient denied any concerns with previous influenza vaccinations. Influenza vaccination screening questions answered (see IMMUNIZATIONS for answers). Allergies reviewed. Influenza vaccine order placed per standing order. VIS handout reviewed and given to patient to take home. Influenza prepared and administered IM into right deltoid. Administration documented in IMMUNIZATIONS (see flowsheet and order for further information). Patient instructed to wait in lobby for 15 minutes post-injection and notify staff immediately of any reaction.      Rosita GARCIAN, RN on 10/28/2019 at 3:04 PM

## 2020-01-14 DIAGNOSIS — N80.9 ENDOMETRIOSIS: Primary | ICD-10-CM

## 2020-01-14 NOTE — TELEPHONE ENCOUNTER
Request from pharmacy for birth control pills mono-linyah 28 day tablet directions to take 1 tab by mouth daily. Medication is not on current med list. Med teed up for consideration.

## 2020-01-15 RX ORDER — NORGESTIMATE AND ETHINYL ESTRADIOL 0.25-0.035
1 KIT ORAL DAILY
Qty: 84 TABLET | Refills: 2 | Status: SHIPPED | OUTPATIENT
Start: 2020-01-15 | End: 2020-01-22

## 2020-01-15 NOTE — TELEPHONE ENCOUNTER
Henok Vincent sent Rx request for the following:      norgestimate-ethinyl estradiol 0.25-35 MG-MCG tablet    Last Prescription Date:   Not on active med list  Last Fill Qty/Refills:         , R-    Last Office Visit:              9/24/19   Future Office visit:           None noted    Request from pharmacy for birth control pills mono-linyah 28 day tablet directions to take 1 tab by mouth daily. Medication is not on current med list. Med teed up for consideration.     Routing refill request to provider for review/approval because:    Drug not active on patient's medication list - medication was discontinued on 7/10/19 - pt reported as not taking.

## 2020-01-22 ENCOUNTER — OFFICE VISIT (OUTPATIENT)
Dept: FAMILY MEDICINE | Facility: OTHER | Age: 30
End: 2020-01-22
Attending: NURSE PRACTITIONER
Payer: COMMERCIAL

## 2020-01-22 VITALS
DIASTOLIC BLOOD PRESSURE: 74 MMHG | OXYGEN SATURATION: 99 % | TEMPERATURE: 97.8 F | HEART RATE: 77 BPM | BODY MASS INDEX: 25.66 KG/M2 | WEIGHT: 154 LBS | SYSTOLIC BLOOD PRESSURE: 120 MMHG | RESPIRATION RATE: 18 BRPM | HEIGHT: 65 IN

## 2020-01-22 DIAGNOSIS — R05.9 COUGH: ICD-10-CM

## 2020-01-22 DIAGNOSIS — R07.0 THROAT PAIN: Primary | ICD-10-CM

## 2020-01-22 DIAGNOSIS — J00 COMMON COLD VIRUS: ICD-10-CM

## 2020-01-22 LAB
FLUAV+FLUBV RNA SPEC QL NAA+PROBE: NEGATIVE
FLUAV+FLUBV RNA SPEC QL NAA+PROBE: NEGATIVE
RSV RNA SPEC NAA+PROBE: NEGATIVE
SPECIMEN SOURCE: NORMAL
SPECIMEN SOURCE: NORMAL
STREP GROUP A PCR: NOT DETECTED

## 2020-01-22 PROCEDURE — 87631 RESP VIRUS 3-5 TARGETS: CPT | Mod: ZL | Performed by: NURSE PRACTITIONER

## 2020-01-22 PROCEDURE — 87651 STREP A DNA AMP PROBE: CPT | Mod: ZL | Performed by: NURSE PRACTITIONER

## 2020-01-22 PROCEDURE — 99213 OFFICE O/P EST LOW 20 MIN: CPT | Performed by: NURSE PRACTITIONER

## 2020-01-22 PROCEDURE — G0463 HOSPITAL OUTPT CLINIC VISIT: HCPCS

## 2020-01-22 ASSESSMENT — ENCOUNTER SYMPTOMS
CHILLS: 0
SINUS PRESSURE: 0
FEVER: 0
SORE THROAT: 1
CHEST TIGHTNESS: 0
TROUBLE SWALLOWING: 0
SINUS PAIN: 0
SHORTNESS OF BREATH: 0
COUGH: 0
RHINORRHEA: 1
WHEEZING: 0

## 2020-01-22 ASSESSMENT — MIFFLIN-ST. JEOR: SCORE: 1419.42

## 2020-01-22 ASSESSMENT — PAIN SCALES - GENERAL: PAINLEVEL: MODERATE PAIN (4)

## 2020-01-22 NOTE — NURSING NOTE
Patient presents to clinic experiencing left ear pain, sinus drainage, sore throat and nausea for past 2 days.  Son does have strep throat.    Medication Reconciliation: complete    Kelly Knight LPN

## 2020-01-22 NOTE — PROGRESS NOTES
SUBJECTIVE:   Yohana Robertson is a 29 year old female who presents to clinic today for the following health issues:    HPI  Patient presents for evaluation of lymph node swelling on left ear x 2 days with accompanied sore throat, ear pain and runny nose. No fevers/chills. She brings in her daughter with similar symptoms. Her son was recently diagnosed with strep throat. They both are requesting swabs completed today.   History of tonsillectomy.     Patient Active Problem List    Diagnosis Date Noted     S/P laparoscopic hysterectomy 07/26/2018     Priority: Medium     Former smoker 07/03/2018     Priority: Medium     Asthma 02/11/2018     Priority: Medium     Attention deficit disorder 02/11/2018     Priority: Medium     Acute bilateral low back pain with sciatica 07/27/2017     Priority: Medium     Anxiety 01/09/2014     Priority: Medium     Severe episode of recurrent major depressive disorder (H) 12/26/2013     Priority: Medium     Nonallopathic lesion of lumbar region 08/08/2013     Priority: Medium     IBS (irritable bowel syndrome) 03/28/2013     Priority: Medium     Bunion, left foot 04/11/2012     Priority: Medium     Common migraine 04/20/2011     Priority: Medium     Past Medical History:   Diagnosis Date     Bunion of great toe of left foot     No Comments Provided     Contraceptive management     started on ALYSSIA     Endometriosis 07/2018    diagnosed laparoscopically      Past Surgical History:   Procedure Laterality Date     BUNIONECTOMY      4/2005,Left     COLONOSCOPY      7/2013,Normal     CYSTOSCOPY N/A 7/26/2018    Procedure: CYSTOSCOPY;;  Surgeon: Rneate Ch MD;  Location:  OR     DENTAL SURGERY      wisdom teeth     FRACTURE SURGERY      6/13,L Foot-tarsometatarsal realignment arthrodesis with plate and screw fixation, capsule tendon balancing procedures about the first tarsometatarsal joint, Landry Gibson DPM  [facility]     hardware removal Left      "7/26/2006,796369,REMOVAL OF FOREIGN BODY,Removal of harware L foot after bunionectomy [Other][[[[     LAPAROSCOPIC HYSTERECTOMY TOTAL, SALPINGECTOMY N/A 7/26/2018    Procedure: LAPAROSCOPIC HYSTERECTOMY TOTAL, SALPINGECTOMY;  Total Laparoscopic Hysterectomy & Bilateral Salpingectomy, Cystoscopy.;  Surgeon: Renaet Ch MD;  Location: [facility] OR     TONSILLECTOMY      12/2005       Review of Systems   Constitutional: Negative for chills and fever.   HENT: Positive for congestion, ear pain, rhinorrhea and sore throat. Negative for ear discharge, hearing loss, sinus pressure, sinus pain and trouble swallowing.    Respiratory: Negative for cough, chest tightness, shortness of breath and wheezing.         OBJECTIVE:     /74 (BP Location: Right arm, Patient Position: Sitting, Cuff Size: Adult Regular)   Pulse 77   Temp 97.8  F (36.6  C) (Tympanic)   Resp 18   Ht 1.651 m (5' 5\")   Wt 69.9 kg (154 lb)   LMP 07/01/2018   SpO2 99%   Breastfeeding No   BMI 25.63 kg/m    Body mass index is 25.63 kg/m .  Physical Exam  Constitutional:       Appearance: Normal appearance. She is normal weight.   HENT:      Head: Normocephalic.      Right Ear: Tympanic membrane normal.      Left Ear: Tympanic membrane normal.      Nose: Congestion present.      Mouth/Throat:      Mouth: Mucous membranes are moist.      Pharynx: No oropharyngeal exudate or posterior oropharyngeal erythema.   Cardiovascular:      Rate and Rhythm: Normal rate and regular rhythm.   Pulmonary:      Effort: Pulmonary effort is normal. No respiratory distress.      Breath sounds: Normal breath sounds.   Lymphadenopathy:      Cervical: Cervical adenopathy present.   Neurological:      Mental Status: She is alert.     Noted swollen tonsillar node left side.     Diagnostic Test Results:  Results for orders placed or performed in visit on 01/22/20 (from the past 24 hour(s))   Group A Streptococcus PCR Throat Swab   Result Value Ref Range    Specimen " Description Throat     Strep Group A PCR Not Detected NDET^Not Detected   Influenza A and B and RSV PCR   Result Value Ref Range    Specimen Description Nasopharyngeal     Influenza A PCR Negative NEG^Negative    Influenza B PCR Negative NEG^Negative    Resp Syncytial Virus Negative NEG^Negative       ASSESSMENT/PLAN:   1. Throat pain  Strep negative.   - Group A Streptococcus PCR Throat Swab    2. Cough  Influenza negative.  - Influenza A and B and RSV PCR    3. Common cold virus  Watch lymph node it should improve over the next week once illness subsides. If not, suggest re-evaluation. Plan on symptomatic treatment for nasal congestion and sore throat. Consider throat lozenges and decongestants. Follow-up for worsening of symptoms in the next week.     Radha [facility]

## 2020-01-23 ASSESSMENT — ASTHMA QUESTIONNAIRES: ACT_TOTALSCORE: 25

## 2020-02-26 ENCOUNTER — OFFICE VISIT (OUTPATIENT)
Dept: FAMILY MEDICINE | Facility: OTHER | Age: 30
End: 2020-02-26
Attending: FAMILY MEDICINE
Payer: COMMERCIAL

## 2020-02-26 VITALS
HEART RATE: 68 BPM | BODY MASS INDEX: 26.76 KG/M2 | DIASTOLIC BLOOD PRESSURE: 64 MMHG | TEMPERATURE: 97.5 F | RESPIRATION RATE: 18 BRPM | SYSTOLIC BLOOD PRESSURE: 130 MMHG | WEIGHT: 160.8 LBS

## 2020-02-26 DIAGNOSIS — M54.41 ACUTE RIGHT-SIDED LOW BACK PAIN WITH RIGHT-SIDED SCIATICA: Primary | ICD-10-CM

## 2020-02-26 PROCEDURE — G0463 HOSPITAL OUTPT CLINIC VISIT: HCPCS | Mod: 25

## 2020-02-26 PROCEDURE — 96372 THER/PROPH/DIAG INJ SC/IM: CPT

## 2020-02-26 PROCEDURE — 99213 OFFICE O/P EST LOW 20 MIN: CPT | Performed by: FAMILY MEDICINE

## 2020-02-26 PROCEDURE — G0463 HOSPITAL OUTPT CLINIC VISIT: HCPCS

## 2020-02-26 PROCEDURE — 25000128 H RX IP 250 OP 636: Performed by: FAMILY MEDICINE

## 2020-02-26 RX ORDER — KETOROLAC TROMETHAMINE 30 MG/ML
30 INJECTION, SOLUTION INTRAMUSCULAR; INTRAVENOUS EVERY 6 HOURS PRN
Status: DISCONTINUED | OUTPATIENT
Start: 2020-02-26 | End: 2020-02-26 | Stop reason: CLARIF

## 2020-02-26 RX ORDER — TIZANIDINE 2 MG/1
TABLET ORAL
Qty: 30 TABLET | Refills: 1 | Status: SHIPPED | OUTPATIENT
Start: 2020-02-26 | End: 2020-06-16

## 2020-02-26 RX ORDER — KETOROLAC TROMETHAMINE 30 MG/ML
30 INJECTION, SOLUTION INTRAMUSCULAR; INTRAVENOUS ONCE
Status: COMPLETED | OUTPATIENT
Start: 2020-02-26 | End: 2020-02-26

## 2020-02-26 RX ADMIN — KETOROLAC TROMETHAMINE 30 MG: 30 INJECTION, SOLUTION INTRAMUSCULAR at 10:41

## 2020-02-26 ASSESSMENT — ANXIETY QUESTIONNAIRES
6. BECOMING EASILY ANNOYED OR IRRITABLE: SEVERAL DAYS
1. FEELING NERVOUS, ANXIOUS, OR ON EDGE: NOT AT ALL
GAD7 TOTAL SCORE: 8
2. NOT BEING ABLE TO STOP OR CONTROL WORRYING: SEVERAL DAYS
7. FEELING AFRAID AS IF SOMETHING AWFUL MIGHT HAPPEN: SEVERAL DAYS
5. BEING SO RESTLESS THAT IT IS HARD TO SIT STILL: MORE THAN HALF THE DAYS
3. WORRYING TOO MUCH ABOUT DIFFERENT THINGS: SEVERAL DAYS
IF YOU CHECKED OFF ANY PROBLEMS ON THIS QUESTIONNAIRE, HOW DIFFICULT HAVE THESE PROBLEMS MADE IT FOR YOU TO DO YOUR WORK, TAKE CARE OF THINGS AT HOME, OR GET ALONG WITH OTHER PEOPLE: NOT DIFFICULT AT ALL

## 2020-02-26 ASSESSMENT — PAIN SCALES - GENERAL: PAINLEVEL: WORST PAIN (10)

## 2020-02-26 ASSESSMENT — PATIENT HEALTH QUESTIONNAIRE - PHQ9: 5. POOR APPETITE OR OVEREATING: MORE THAN HALF THE DAYS

## 2020-02-26 NOTE — NURSING NOTE
"Patient here for back pain, has a history of back problems. Yesterday she bent down at work to  something, and bent wrong.   Alysia Gardner LPN ..........2/26/2020 10:06 AM   Chief Complaint   Patient presents with     Pain     Back       Initial /64 (BP Location: Right arm, Patient Position: Sitting, Cuff Size: Adult Regular)   Pulse 68   Temp 97.5  F (36.4  C) (Tympanic)   Resp 18   Wt 72.9 kg (160 lb 12.8 oz)   LMP 07/01/2018 (LMP Unknown)   BMI 26.76 kg/m   Estimated body mass index is 26.76 kg/m  as calculated from the following:    Height as of 1/22/20: 1.651 m (5' 5\").    Weight as of this encounter: 72.9 kg (160 lb 12.8 oz).  Medication Reconciliation: complete    Alysia Gardner LPN    "

## 2020-02-26 NOTE — LETTER
My Depression Action Plan  Name: Yohana Robertson   Date of Birth 1990  Date: 2/26/2020    My doctor: Oscar Robertson   My clinic: [facility]  [address]  [place] [id number] [phone]          GREEN    ZONE   Good Control    What it looks like:     Things are going generally well. You have normal ups and downs. You may even feel depressed from time to time, but bad moods usually last less than a day.   What you need to do:  1. Continue to care for yourself (see self care plan)  2. Check your depression survival kit and update it as needed  3. Follow your physician s recommendations including any medication.  4. Do not stop taking medication unless you consult with your physician first.           YELLOW         ZONE Getting Worse    What it looks like:     Depression is starting to interfere with your life.     It may be hard to get out of bed; you may be starting to isolate yourself from others.    Symptoms of depression are starting to last most all day and this has happened for several days.     You may have suicidal thoughts but they are not constant.   What you need to do:     1. Call your care team. Your response to treatment will improve if you keep your care team informed of your progress. Yellow periods are signs an adjustment may need to be made.     2. Continue your self-care.  Just get dressed and ready for the day.  Don't give yourself time to talk yourself out of it.    3. Talk to someone in your support network.    4. Open up your Depression Self-Care Plan/Wellness Kit.           RED    ZONE Medical Alert - Get Help    What it looks like:     Depression is seriously interfering with your life.     You may experience these or other symptoms: You can t get out of bed most days, can t work or engage in other necessary activities, you have trouble taking care of basic hygiene, or basic responsibilities, thoughts of suicide or death that will  not go away, self-injurious behavior.     What you need to do:  1. Call your care team and request a same-day appointment. If they are not available (weekends or after hours) call your local crisis line, emergency room or 911.            Depression Self-Care Plan / Wellness Kit    Self-Care for Depression  Here s the deal. Your body and mind are really not as separate as most people think.  What you do and think affects how you feel and how you feel influences what you do and think. This means if you do things that people who feel good do, it will help you feel better.  Sometimes this is all it takes.  There is also a place for medication and therapy depending on how severe your depression is, so be sure to consult with your medical provider and/ or Behavioral Health Consultant if your symptoms are worsening or not improving.     In order to better manage my stress, I will:    Exercise  Get some form of exercise, every day. This will help reduce pain and release endorphins, the  feel good  chemicals in your brain. This is almost as good as taking antidepressants!  This is not the same as joining a gym and then never going! (they count on that by the way ) It can be as simple as just going for a walk or doing some gardening, anything that will get you moving.      Hygiene   Maintain good hygiene (get out of bed in the morning, make your bed, brush your teeth, take a shower, and get dressed like you were going to work, even if you are unemployed).  If your clothes don't fit try to get ones that do.    Diet  Strive to eat foods that are good for me, drink plenty of water, and avoid excessive sugar, caffeine, alcohol, and other mood-altering substances.  Some foods that are helpful in depression are: complex carbohydrates, B vitamins, flaxseed, fish or fish oil, fresh fruits and vegetables.    Psychotherapy  Agree to participate in Individual Therapy (if recommended).    Medication  If prescribed medications, I agree to  take them.  Missing doses can result in serious side effects.  I understand that drinking alcohol, or other illicit drug use, may cause potential side effects.  I will not stop my medication abruptly without first discussing it with my provider.    Staying Connected With Others  Stay in touch with my friends, family members, and my primary care provider/team.    Use your imagination  Be creative.  We all have a creative side; it doesn t matter if it s oil painting, sand castles, or mud pies! This will also kick up the endorphins.    Witness Beauty  (AKA stop and smell the roses) Take a look outside, even in mid-winter. Notice colors, textures. Watch the squirrels and birds.     Service to others  Be of service to others.  There is always someone else in need.  By helping others we can  get out of ourselves  and remember the really important things.  This also provides opportunities for practicing all the other parts of the program.    Humor  Laugh and be silly!  Adjust your TV habits for less news and crime-drama and more comedy.    Control your stress  Try breathing deep, massage therapy, biofeedback, and meditation. Find time to relax each day.     Crisis Text Line  http://www.crisistextline.org    The Crisis Text Line serves anyone, in any type of crisis, providing access to free, 24/7 support and information via the medium people already use and trust:    Here's how it works:  1.  Text 464-567 from anywhere in the [place], anytime, about any type of crisis.  2.  A live, trained Crisis Counselor receives the text and responds quickly.  3.  The volunteer Crisis Counselor will help you move from a 'hot moment to a cool moment'.    My support system    Clinic Contact:  Phone number:    Contact 1:  Phone number:    Contact 2:  Phone number:    Shinto/:  Phone number:    Therapist:  Phone number:    Local crisis center:    Phone number:    Other community support:  Phone number:

## 2020-02-26 NOTE — PROGRESS NOTES
SUBJECTIVE:   Yohana Robertson is a 29 year old female who presents to clinic today for the following health issues:    Patient reports that last night she bent over to pick something up off the floor and developed sudden onset of low back pain. Now with pain more to the right of the low back. Has used ibuprofen since then, ice pack, warm pack and stretching.  Occasionally she notes pain into her right leg.  No bowel or bladder incontinence.  Did do physical therapy a couple years ago for back pain.         Review of Systems  See HPI, All other systems reviewed and are otherwise negative.       OBJECTIVE:     /64 (BP Location: Right arm, Patient Position: Sitting, Cuff Size: Adult Regular)   Pulse 68   Temp 97.5  F (36.4  C) (Tympanic)   Resp 18   Wt 72.9 kg (160 lb 12.8 oz)   LMP 07/01/2018 (LMP Unknown)   BMI 26.76 kg/m    Body mass index is 26.76 kg/m .  Physical Exam  Constitutional:       Appearance: She is well-developed.   HENT:      Right Ear: External ear normal.      Left Ear: External ear normal.   Eyes:      General: No scleral icterus.     Conjunctiva/sclera: Conjunctivae normal.   Cardiovascular:      Rate and Rhythm: Normal rate.   Pulmonary:      Effort: Pulmonary effort is normal. No respiratory distress.   Musculoskeletal:      Comments: Pain with palpation along the lumbar spine Paraspinous muscles, no vertebral tenderness. Decreased range of motion at the hips due to pain. Negative straight leg raise. normal LE strength.    Skin:     Findings: No rash.   Neurological:      Mental Status: She is alert.         ASSESSMENT/PLAN:         ICD-10-CM    1. Acute right-sided low back pain with right-sided sciatica M54.41 tiZANidine (ZANAFLEX) 2 MG tablet     ketorolac (TORADOL) injection 30 mg     DISCONTINUED: ketorolac (TORADOL) injection 30 mg     Patient offered a Toradol injection today for assistance with acute pain.  Recommend ongoing use of NSAIDs.  Small amount of muscle relaxant  provided for symptomatic management.  Patient will resume stretching exercises previously provided by physical therapy.  Discussed that symptoms will likely improve in the next 2 to 4 weeks.  If she would like to proceed with physical therapy, she will contact us.  Jose Cruz that she should continue to do the back strengthening exercises long-term as she will always be at risk for exacerbation of back pain.  Continue with heat and or ice if helpful.  Reviewed red flags that would result in prompt follow-up.    Jen Shields MD  [facility]

## 2020-02-26 NOTE — NURSING NOTE
Clinic Administered Medication Documentation      Injectable Medication Documentation    Patient was given Ketorolac Tromethamine (Toradol). Prior to medication administration, verified patients identity using patient s name and date of birth. Please see MAR and medication order for additional information. Patient instructed to remain in clinic for 15 minutes and report any adverse reaction to staff immediately .      Was entire vial of medication used? Yes  Vial/Syringe: Single dose vial  Expiration Date:  10/2021  Was this medication supplied by the patient? No

## 2020-02-26 NOTE — LETTER
My Asthma Action Plan    Name: Yohana Robertson   YOB: 1990  Date: 2/26/2020   My doctor: Jen Shields MD   My clinic: [facility]        My Rescue Medicine:   Albuterol inhaler (Proair/Ventolin/Proventil HFA)  2-4 puffs EVERY 4 HOURS as needed. Use a spacer if recommended by your provider.   My Asthma Severity:   Intermittent / Exercise Induced  Know your asthma triggers: pt aware             GREEN ZONE   Good Control    I feel good    No cough or wheeze    Can work, sleep and play without asthma symptoms       Take your asthma control medicine every day.     1. If exercise triggers your asthma, take your rescue medication    15 minutes before exercise or sports, and    During exercise if you have asthma symptoms  2. Spacer to use with inhaler: If you have a spacer, make sure to use it with your inhaler             YELLOW ZONE Getting Worse  I have ANY of these:    I do not feel good    Cough or wheeze    Chest feels tight    Wake up at night   1. Keep taking your Green Zone medications  2. Start taking your rescue medicine:    every 20 minutes for up to 1 hour. Then every 4 hours for 24-48 hours.  3. If you stay in the Yellow Zone for more than 12-24 hours, contact your doctor.  4. If you do not return to the Green Zone in 12-24 hours or you get worse, start taking your oral steroid medicine if prescribed by your provider.           RED ZONE Medical Alert - Get Help  I have ANY of these:    I feel awful    Medicine is not helping    Breathing getting harder    Trouble walking or talking    Nose opens wide to breathe       1. Take your rescue medicine NOW  2. If your provider has prescribed an oral steroid medicine, start taking it NOW  3. Call your doctor NOW  4. If you are still in the Red Zone after 20 minutes and you have not reached your doctor:    Take your rescue medicine again and    Call 911 or go to the emergency room right away    See your regular doctor within 2  weeks of an Emergency Room or Urgent Care visit for follow-up treatment.          Annual Reminders:  Meet with Asthma Educator,  Flu Shot in the Fall, consider Pneumonia Vaccination for patients with asthma (aged 19 and older).    Pharmacy: [id number] - [place], [place] - [address]    Electronically signed by Jen Shields MD   Date: 02/26/20                    Asthma Triggers  How To Control Things That Make Your Asthma Worse    Triggers are things that make your asthma worse.  Look at the list below to help you find your triggers and   what you can do about them. You can help prevent asthma flare-ups by staying away from your triggers.      Trigger                                                          What you can do   Cigarette Smoke  Tobacco smoke can make asthma worse. Do not allow smoking in your home, car or around you.  Be sure no one smokes at a child s day care or school.  If you smoke, ask your health care provider for ways to help you quit.  Ask family members to quit too.  Ask your health care provider for a referral to Quit Plan to help you quit smoking, or call 7-781-178PLAN.     Colds, Flu, Bronchitis  These are common triggers of asthma. Wash your hands often.  Don t touch your eyes, nose or mouth.  Get a flu shot every year.     Dust Mites  These are tiny bugs that live in cloth or carpet. They are too small to see. Wash sheets and blankets in hot water every week.   Encase pillows and mattress in dust mite proof covers.  Avoid having carpet if you can. If you have carpet, vacuum weekly.   Use a dust mask and HEPA vacuum.   Pollen and Outdoor Mold  Some people are allergic to trees, grass, or weed pollen, or molds. Try to keep your windows closed.  Limit time out doors when pollen count is high.   Ask you health care provider about taking medicine during allergy season.     Animal Dander  Some people are allergic to skin flakes, urine or saliva from pets with fur or  feathers. Keep pets with fur or feathers out of your home.    If you can t keep the pet outdoors, then keep the pet out of your bedroom.  Keep the bedroom door closed.  Keep pets off cloth furniture and away from stuffed toys.     Mice, Rats, and Cockroaches  Some people are allergic to the waste from these pests.   Cover food and garbage.  Clean up spills and food crumbs.  Store grease in the refrigerator.   Keep food out of the bedroom.   Indoor Mold  This can be a trigger if your home has high moisture. Fix leaking faucets, pipes, or other sources of water.   Clean moldy surfaces.  Dehumidify basement if it is damp and smelly.   Smoke, Strong Odors, and Sprays  These can reduce air quality. Stay away from strong odors and sprays, such as perfume, powder, hair spray, paints, smoke incense, paint, cleaning products, candles and new carpet.   Exercise or Sports  Some people with asthma have this trigger. Be active!  Ask your doctor about taking medicine before sports or exercise to prevent symptoms.    Warm up for 5-10 minutes before and after sports or exercise.     Other Triggers of Asthma  Cold air:  Cover your nose and mouth with a scarf.  Sometimes laughing or crying can be a trigger.  Some medicines and food can trigger asthma.

## 2020-02-27 ASSESSMENT — ASTHMA QUESTIONNAIRES: ACT_TOTALSCORE: 25

## 2020-02-27 ASSESSMENT — ANXIETY QUESTIONNAIRES: GAD7 TOTAL SCORE: 8

## 2020-03-01 ENCOUNTER — HOSPITAL ENCOUNTER (EMERGENCY)
Facility: OTHER | Age: 30
Discharge: HOME | End: 2020-03-01
Attending: PHYSICIAN ASSISTANT | Admitting: PHYSICIAN ASSISTANT
Payer: COMMERCIAL

## 2020-03-01 VITALS
OXYGEN SATURATION: 100 % | WEIGHT: 160 LBS | TEMPERATURE: 98.2 F | DIASTOLIC BLOOD PRESSURE: 63 MMHG | RESPIRATION RATE: 16 BRPM | HEART RATE: 63 BPM | HEIGHT: 65 IN | BODY MASS INDEX: 26.66 KG/M2 | SYSTOLIC BLOOD PRESSURE: 109 MMHG

## 2020-03-01 DIAGNOSIS — M54.50 LOWER BACK PAIN: ICD-10-CM

## 2020-03-01 LAB
ALBUMIN UR-MCNC: NEGATIVE MG/DL
APPEARANCE UR: CLEAR
BILIRUB UR QL STRIP: NEGATIVE
COLOR UR AUTO: NORMAL
GLUCOSE UR STRIP-MCNC: NEGATIVE MG/DL
HCG UR QL: NEGATIVE
HGB UR QL STRIP: NEGATIVE
KETONES UR STRIP-MCNC: NEGATIVE MG/DL
LEUKOCYTE ESTERASE UR QL STRIP: NEGATIVE
NITRATE UR QL: NEGATIVE
PH UR STRIP: 6 PH (ref 5–7)
SOURCE: NORMAL
SP GR UR STRIP: 1.02 (ref 1–1.03)
UROBILINOGEN UR STRIP-MCNC: NORMAL MG/DL (ref 0–2)

## 2020-03-01 PROCEDURE — 99284 EMERGENCY DEPT VISIT MOD MDM: CPT | Performed by: PHYSICIAN ASSISTANT

## 2020-03-01 PROCEDURE — 81025 URINE PREGNANCY TEST: CPT | Performed by: PHYSICIAN ASSISTANT

## 2020-03-01 PROCEDURE — 99283 EMERGENCY DEPT VISIT LOW MDM: CPT | Performed by: PHYSICIAN ASSISTANT

## 2020-03-01 PROCEDURE — 81003 URINALYSIS AUTO W/O SCOPE: CPT | Performed by: PHYSICIAN ASSISTANT

## 2020-03-01 PROCEDURE — 99283 EMERGENCY DEPT VISIT LOW MDM: CPT | Mod: Z6 | Performed by: PHYSICIAN ASSISTANT

## 2020-03-01 PROCEDURE — 51798 US URINE CAPACITY MEASURE: CPT | Performed by: PHYSICIAN ASSISTANT

## 2020-03-01 ASSESSMENT — MIFFLIN-ST. JEOR: SCORE: 1446.64

## 2020-03-01 NOTE — ED NOTES
Pt reports needing to leave AMA because she needs to  her kids, told pt that MD wanted to consult a neurologist, pt states that she will follow up tomorrow with her regular MD

## 2020-03-01 NOTE — ED TRIAGE NOTES
At work on Tuesday, bent over to  a piece of trash and stood back up and had lower back pain after that.  Saw PCP on wed for pain, patient states she was given a pain shot and RX for a muscle relaxer. Muscle relaxer and ibuprofen for pain has not worked.

## 2020-03-03 ASSESSMENT — ENCOUNTER SYMPTOMS
CHEST TIGHTNESS: 0
CHILLS: 0
BACK PAIN: 1
SHORTNESS OF BREATH: 0
ADENOPATHY: 0
HEMATURIA: 0
FEVER: 0
BRUISES/BLEEDS EASILY: 0
CONFUSION: 0
WOUND: 0
ABDOMINAL PAIN: 0

## 2020-03-03 NOTE — ED PROVIDER NOTES
History     Chief Complaint   Patient presents with     Back Pain     HPI  Yohana Robertson is a 29 year old female who presents to the ED with a complaint of back pain. She was At work on Tuesday, bent over to  a piece of trash and stood back up and had lower back pain after that.  Saw PCP on wed for pain, patient states she was given a pain shot and RX for a muscle relaxer. Muscle relaxer and ibuprofen for pain has not worked.  She reports that she has had a slight incontinence of urine recently but no saddle anesthesia and no shooting pains down her legs.  She denies being an IV drug user, cancer history, recent fevers.    Allergies:  Allergies   Allergen Reactions     Codeine Hives and Shortness Of Breath     Peanuts [Nuts] Shortness Of Breath     Hives       Problem List:    Patient Active Problem List    Diagnosis Date Noted     S/P laparoscopic hysterectomy 07/26/2018     Priority: Medium     Former smoker 07/03/2018     Priority: Medium     Asthma 02/11/2018     Priority: Medium     Attention deficit disorder 02/11/2018     Priority: Medium     Acute bilateral low back pain with sciatica 07/27/2017     Priority: Medium     Anxiety 01/09/2014     Priority: Medium     Severe episode of recurrent major depressive disorder (H) 12/26/2013     Priority: Medium     Nonallopathic lesion of lumbar region 08/08/2013     Priority: Medium     IBS (irritable bowel syndrome) 03/28/2013     Priority: Medium     Bunion, left foot 04/11/2012     Priority: Medium     Common migraine 04/20/2011     Priority: Medium        Past Medical History:    Past Medical History:   Diagnosis Date     Bunion of great toe of left foot      Contraceptive management      Endometriosis 07/2018       Past Surgical History:    Past Surgical History:   Procedure Laterality Date     BUNIONECTOMY      4/2005,Left     COLONOSCOPY      7/2013,Normal     CYSTOSCOPY N/A 7/26/2018    Procedure: CYSTOSCOPY;;  Surgeon: Renate Ch MD;   Location:  OR     DENTAL SURGERY      wisdom teeth     FRACTURE SURGERY      6/13,L Foot-tarsometatarsal realignment arthrodesis with plate and screw fixation, capsule tendon balancing procedures about the first tarsometatarsal joint, Landry Gibson DPM  [facility]     hardware removal Left     7/26/2006,394622,REMOVAL OF FOREIGN BODY,Removal of harware L foot after bunionectomy [Other][[[[     LAPAROSCOPIC HYSTERECTOMY TOTAL, SALPINGECTOMY N/A 7/26/2018    Procedure: LAPAROSCOPIC HYSTERECTOMY TOTAL, SALPINGECTOMY;  Total Laparoscopic Hysterectomy & Bilateral Salpingectomy, Cystoscopy.;  Surgeon: Renate Ch MD;  Location:  OR     TONSILLECTOMY      12/2005       Family History:    Family History   Problem Relation Age of Onset     Heart Disease Mother         Heart Disease,Aortic stenosis and valve replaced     Family History Negative Father         Good Health     Heart Disease Maternal Grandfather         Heart Disease     Asthma Maternal Grandmother         Asthma       Social History:  Marital Status:  Single [1]  Social History     Tobacco Use     Smoking status: Current Every Day Smoker     Years: 9.00     Types: Cigarettes     Smokeless tobacco: Never Used     Tobacco comment: using e-cig   Substance Use Topics     Alcohol use: Yes     Alcohol/week: 21.0 standard drinks     Comment: couple of brandys a night     Drug use: No     Comment: Drug use: No        Medications:    ibuprofen (ADVIL/MOTRIN) 600 MG tablet  tiZANidine (ZANAFLEX) 2 MG tablet          Review of Systems   Constitutional: Negative for chills and fever.   HENT: Negative for congestion.    Eyes: Negative for visual disturbance.   Respiratory: Negative for chest tightness and shortness of breath.    Cardiovascular: Negative for chest pain.   Gastrointestinal: Negative for abdominal pain.   Genitourinary: Positive for urgency. Negative for hematuria.   Musculoskeletal: Positive for back pain.   Skin: Negative for rash  "and wound.   Neurological: Negative for syncope.   Hematological: Negative for adenopathy. Does not bruise/bleed easily.   Psychiatric/Behavioral: Negative for confusion.       Physical Exam   BP: 109/63  Pulse: 63  Temp: 98.2  F (36.8  C)  Resp: 16  Height: 165.1 cm (5' 5\")  Weight: 72.6 kg (160 lb)  SpO2: 100 %      Physical Exam  Constitutional:       General: She is not in acute distress.     Appearance: She is well-developed. She is not diaphoretic.   HENT:      Head: Normocephalic and atraumatic.   Eyes:      General: No scleral icterus.     Conjunctiva/sclera: Conjunctivae normal.      Pupils: Pupils are equal, round, and reactive to light.   Neck:      Musculoskeletal: Neck supple.   Cardiovascular:      Rate and Rhythm: Normal rate and regular rhythm.   Pulmonary:      Effort: Pulmonary effort is normal.      Breath sounds: Normal breath sounds.   Abdominal:      Palpations: Abdomen is soft.      Tenderness: There is no abdominal tenderness.   Musculoskeletal: Normal range of motion.         General: No deformity.   Lymphadenopathy:      Cervical: No cervical adenopathy.   Skin:     General: Skin is warm and dry.      Findings: No rash.   Neurological:      Mental Status: She is alert and oriented to person, place, and time. Mental status is at baseline.      Cranial Nerves: No cranial nerve deficit.      Sensory: No sensory deficit.      Motor: No weakness.      Coordination: Coordination normal.      Comments: Hyper reflexia of patella tendons   Psychiatric:         Mood and Affect: Mood normal.         Behavior: Behavior normal.         ED Course        Procedures               Critical Care time:  none               No results found for this or any previous visit (from the past 24 hour(s)).    Medications - No data to display    Assessments & Plan (with Medical Decision Making)   Pt nontoxic in NAD. Heart, lung, bowel sounds normal, abd soft, nontender to palpation, nondistended. VSS, afebrile    She " does have lower back tenderness to palpation.  She does say that she has had some episodes of bladder incontinence but has no saddle anesthesia otherwise no other red flag risk factors or symptoms for cord compression.    A PVR bladder scan is done which showed 0 mls.    I did offer a rectal exam to check for tone, she declined.  I let her know that I would contact a neurosurgeon to discuss the need for possible MRI and possibly even a transfer today as we do not have MRI capabilities at this time.  She appeared to agree with that plan.  As I was in the room with another patient and came back out I was told by the nurse that she decided to leave AGAINST MEDICAL ADVICE.  Prior to her leaving I was clear with her that there was a possibility for concerning an emergent process that was occurring such as cord compression syndrome.  Without further assessment, studies, neurosurgery consult it is impossible for me to know if that is occurring.  I hope that she follows up with her PCP for further management.    Armando Paez PA-C    I have reviewed the nursing notes.    I have reviewed the findings, diagnosis, plan and need for follow up with the patient.       Discharge Medication List as of 3/1/2020  3:50 PM          Final diagnoses:   Lower back pain       3/1/2020   [facility]     Armando Paez PA  03/03/20 0158

## 2020-03-11 ENCOUNTER — HEALTH MAINTENANCE LETTER (OUTPATIENT)
Age: 30
End: 2020-03-11

## 2020-06-15 NOTE — PROGRESS NOTES
Nursing Notes:   Tiera Orourke LPN  6/16/2020  9:14 AM  Sign at exiting of workspace  Patient presents to clinic with right ankle pain. She states that she sprained it last summer and did PT for it. She states that she works 8 hrs a day and after a shift it is swollen and painful.  Tiera Orourke LPN ....................  6/16/2020   9:14 AM      SUBJECTIVE:     HPI  Yohana Robertson is a 29 year old female who presents to clinic today for evaluation of right ankle pain. States last summer she sprained her ankle and subsequently had PT for it. X-rays were negative at that time. She wore a walking boot and used crutches for a few weeks followed by an aircast ankle splint.  Recently she began having more ankle pain especially after working a full day. She notes more swelling and pain in her ankle after a long day at work. States the pain never really fully went away after the initial injury. She has been wearing the aircast splint some and that has been helping a little to improve symptoms. No associated numbness or tingling.       Review of Systems   Per HPI.    PAST MEDICAL HISTORY:   Past Medical History:   Diagnosis Date     Bunion of great toe of left foot     No Comments Provided     Contraceptive management     started on ALYSSIA     Endometriosis 07/2018    diagnosed laparoscopically       PAST SURGICAL HISTORY:   Past Surgical History:   Procedure Laterality Date     BUNIONECTOMY      4/2005,Left     COLONOSCOPY      7/2013,Normal     CYSTOSCOPY N/A 7/26/2018    Procedure: CYSTOSCOPY;;  Surgeon: Renate Ch MD;  Location:  OR     DENTAL SURGERY      wisdom teeth     FRACTURE SURGERY      6/13,L Foot-tarsometatarsal realignment arthrodesis with plate and screw fixation, capsule tendon balancing procedures about the first tarsometatarsal joint, Landry Gibson DPM  [facility]     hardware removal Left     7/26/2006,[id number],REMOVAL OF FOREIGN BODY,Removal of harware L foot after  "bunionectomy [Other][[[[     LAPAROSCOPIC HYSTERECTOMY TOTAL, SALPINGECTOMY N/A 7/26/2018    Procedure: LAPAROSCOPIC HYSTERECTOMY TOTAL, SALPINGECTOMY;  Total Laparoscopic Hysterectomy & Bilateral Salpingectomy, Cystoscopy.;  Surgeon: Renate Ch MD;  Location: [facility] OR     TONSILLECTOMY      12/2005       FAMILY HISTORY:   Family History   Problem Relation Age of Onset     Heart Disease Mother         Heart Disease,Aortic stenosis and valve replaced     Family History Negative Father         Good Health     Heart Disease Maternal Grandfather         Heart Disease     Asthma Maternal Grandmother         Asthma       SOCIAL HISTORY:   Social History     Tobacco Use     Smoking status: Current Every Day Smoker     Years: 9.00     Types: Cigarettes, Vaping Device     Smokeless tobacco: Never Used     Tobacco comment: using e-cig   Substance Use Topics     Alcohol use: Yes     Alcohol/week: 21.0 standard drinks     Comment: couple of brandys a night        Allergies   Allergen Reactions     Codeine Hives and Shortness Of Breath     Peanuts [Nuts] Shortness Of Breath     Hives     Current Outpatient Medications   Medication     ibuprofen (ADVIL/MOTRIN) 600 MG tablet     No current facility-administered medications for this visit.        OBJECTIVE:     /62   Pulse 76   Temp 97.9  F (36.6  C)   Resp 14   Ht 1.657 m (5' 5.25\")   Wt 73 kg (161 lb)   LMP 07/01/2018 (LMP Unknown)   SpO2 99%   Breastfeeding No   BMI 26.59 kg/m    Body mass index is 26.59 kg/m .  Physical Exam  General: Pleasant, in no apparent distress.  Cardiovascular: Regular rate and rhythm with S1 equal to S2. No murmurs, friction rubs, or gallops.   Respiratory: Lungs are resonant and clear to auscultation bilaterally. No wheezes, crackles, or rhonchi.  Musculoskeletal:   Right ankle: Mild tenderness to palpation over anterolateral ankle. Pain with dorsiflexion of ankle. Able to invert and victor hugo ankle with minimal pain. Antalgic gait. " No bruising. Mild swelling. Strong dorsal pedal pulse.    Left Ankle: No tenderness to palpation. Full ROM. Strong dorsal pedal pulse.  Neurologic Exam: Non-focal, symmetric DTRs, normal gross motor, tone coordination and no visible tremor.  Skin: No jaundice, pallor, rashes, or lesions.  Psych: Appropriate mood and affect.    Right Ankle X-ray Results from 06/16/2020:  Negative for fracture or dislocation per my interpretation. Final radiology read is pending.       ASSESSMENT/PLAN:     1. Right ankle pain, unspecified chronicity    2. Sprain of right ankle, unspecified ligament, subsequent encounter      Discussed with patient that her ankle sprain may have been more significant than initially thought since she continues to have symptoms over one year later. X-ray was negative today. Will order MRI of ankle for further evaluation. Patient will schedule this at her convenience. Encouraged continued symptomatic management in the meantime with Tylenol or ibuprofen, ice or heat, elevating, and wearing the splint as needed.     Andie Topete PA-C  [facility]

## 2020-06-16 ENCOUNTER — OFFICE VISIT (OUTPATIENT)
Dept: FAMILY MEDICINE | Facility: OTHER | Age: 30
End: 2020-06-16
Attending: PHYSICIAN ASSISTANT
Payer: COMMERCIAL

## 2020-06-16 ENCOUNTER — HOSPITAL ENCOUNTER (OUTPATIENT)
Dept: GENERAL RADIOLOGY | Facility: OTHER | Age: 30
End: 2020-06-16
Attending: PHYSICIAN ASSISTANT
Payer: COMMERCIAL

## 2020-06-16 VITALS
DIASTOLIC BLOOD PRESSURE: 62 MMHG | OXYGEN SATURATION: 99 % | HEIGHT: 65 IN | SYSTOLIC BLOOD PRESSURE: 108 MMHG | RESPIRATION RATE: 14 BRPM | TEMPERATURE: 97.9 F | HEART RATE: 76 BPM | WEIGHT: 161 LBS | BODY MASS INDEX: 26.82 KG/M2

## 2020-06-16 DIAGNOSIS — S93.401D SPRAIN OF RIGHT ANKLE, UNSPECIFIED LIGAMENT, SUBSEQUENT ENCOUNTER: ICD-10-CM

## 2020-06-16 DIAGNOSIS — M25.571 RIGHT ANKLE PAIN, UNSPECIFIED CHRONICITY: Primary | ICD-10-CM

## 2020-06-16 DIAGNOSIS — M25.571 RIGHT ANKLE PAIN, UNSPECIFIED CHRONICITY: ICD-10-CM

## 2020-06-16 PROCEDURE — 99213 OFFICE O/P EST LOW 20 MIN: CPT | Performed by: PHYSICIAN ASSISTANT

## 2020-06-16 PROCEDURE — G0463 HOSPITAL OUTPT CLINIC VISIT: HCPCS | Mod: 25

## 2020-06-16 PROCEDURE — 73610 X-RAY EXAM OF ANKLE: CPT | Mod: RT

## 2020-06-16 PROCEDURE — G0463 HOSPITAL OUTPT CLINIC VISIT: HCPCS

## 2020-06-16 ASSESSMENT — MIFFLIN-ST. JEOR: SCORE: 1455.13

## 2020-06-16 ASSESSMENT — PAIN SCALES - GENERAL: PAINLEVEL: MILD PAIN (2)

## 2020-06-16 NOTE — NURSING NOTE
Patient presents to clinic with right ankle pain. She states that she sprained it last summer and did PT for it. She states that she works 8 hrs a day and after a shift it is swollen and painful. She also has concerns about a discolored area on right lower leg area that she has had since last summer also.  Tiera Orourke LPN ....................  6/16/2020   9:14 AM

## 2020-06-22 ENCOUNTER — HOSPITAL ENCOUNTER (OUTPATIENT)
Dept: MRI IMAGING | Facility: OTHER | Age: 30
Discharge: HOME | End: 2020-06-22
Attending: PHYSICIAN ASSISTANT | Admitting: PHYSICIAN ASSISTANT
Payer: COMMERCIAL

## 2020-06-22 DIAGNOSIS — M25.571 RIGHT ANKLE PAIN, UNSPECIFIED CHRONICITY: ICD-10-CM

## 2020-06-22 DIAGNOSIS — S93.401D SPRAIN OF RIGHT ANKLE, UNSPECIFIED LIGAMENT, SUBSEQUENT ENCOUNTER: ICD-10-CM

## 2020-06-22 PROCEDURE — 73721 MRI JNT OF LWR EXTRE W/O DYE: CPT | Mod: RT

## 2020-06-23 ENCOUNTER — TELEPHONE (OUTPATIENT)
Dept: FAMILY MEDICINE | Facility: OTHER | Age: 30
End: 2020-06-23

## 2020-06-23 DIAGNOSIS — S93.491D TEAR OF TALOFIBULAR LIGAMENT OF RIGHT LOWER EXTREMITY, SUBSEQUENT ENCOUNTER: Primary | ICD-10-CM

## 2020-06-23 NOTE — TELEPHONE ENCOUNTER
Upon review or ankle MRI results, orthopedic referral placed.   Andie Topete PA-C on 6/23/2020 at 9:34 AM

## 2020-06-25 ENCOUNTER — OFFICE VISIT (OUTPATIENT)
Dept: ORTHOPEDICS | Facility: OTHER | Age: 30
End: 2020-06-25
Attending: PHYSICIAN ASSISTANT
Payer: COMMERCIAL

## 2020-06-25 VITALS
WEIGHT: 159 LBS | SYSTOLIC BLOOD PRESSURE: 112 MMHG | BODY MASS INDEX: 26.26 KG/M2 | HEART RATE: 64 BPM | DIASTOLIC BLOOD PRESSURE: 74 MMHG

## 2020-06-25 DIAGNOSIS — S93.491D TEAR OF TALOFIBULAR LIGAMENT OF RIGHT LOWER EXTREMITY, SUBSEQUENT ENCOUNTER: ICD-10-CM

## 2020-06-25 PROCEDURE — G0463 HOSPITAL OUTPT CLINIC VISIT: HCPCS

## 2020-06-25 PROCEDURE — 99202 OFFICE O/P NEW SF 15 MIN: CPT | Performed by: PODIATRIST

## 2020-06-25 NOTE — PROGRESS NOTES
Visit Date:   06/25/2020      HISTORY OF PRESENT ILLNESS:  Yohana is a 29-year-old female who is seen regarding right ankle pain and instability.  She injured this over a year ago.  She rolled it quite severely, was told she had a grade 2b sprain, and comes in today with MRI.  She has done therapy.  She continues to use a brace, but continues to have issues with this, and would like to discuss treatment options.      HISTORY REVIEW:  I have reviewed this patient's past medical history, family history, social history as well as medications and allergies.  Any changes/additions were appropriately charted in the patient's electronic medical record.        PHYSICAL EXAMINATION:    CONSTITUTIONAL:  The patient is alert and oriented x3, well appearing and in no apparent distress.  Affect is pleasant and answers questions appropriately.   VASCULAR:  Circulation is intact with palpable pedal pulses and adequate capillary fill time to all digits.  Hair growth is present and appropriate to mid foot and digits.   NEUROLOGIC:  Light touch sensation is intact to digits.  There is a negative Tinel sign.  There are no signs of apparent nerve entrapment of superficial peroneal or common peroneal nerves.   INTEGUMENT:  No abnormal dermatologic lesions are noted.  Skin has normal texture and turgor.     MUSCULOSKELETAL:  She does have quite evident laxity of her ankle with drawer test and is tender to palpate, and fairly limited exam due to guarding due to tenderness particularly anterolateral ankle.  Posterior ankle is also tender.  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      IMAGING:  I did review MRI and agree with findings of ligament tear, large os trigonum, a small joint effusion and FHL tenosynovitis.      PLAN:  I discussed condition and treatment with the patient today.  Given duration of symptoms, failure of therapy and conservative cares needing to use a brace to stabilize and still having  symptoms, I did recommend ankle scope.  I would likely do posterior ankle scope, resection of os trigonum, FHL tenosynovectomy in addition to an open lateral ligament repair modified Brostrom type procedure.  I discussed this in detail including recovery, risks, potential complications, alternatives and benefits.  We will get a preop prior to proceeding, followup accordingly postoperatively.         MELL GONZALEZ DPM             D: 2020   T: 2020   MT:       Name:     AMITA BARRIOS   MRN:      [id number]        Account:      [id number]   :      1990           Visit Date:   2020      Document: [id number]

## 2020-06-25 NOTE — PROGRESS NOTES
Patient is here for consult on her right ankle pain.   Elke Eddy LPN .....................6/25/2020 12:35 PM

## 2020-06-26 PROBLEM — S93.491D: Status: ACTIVE | Noted: 2020-06-26

## 2020-07-15 ENCOUNTER — OFFICE VISIT (OUTPATIENT)
Dept: FAMILY MEDICINE | Facility: OTHER | Age: 30
End: 2020-07-15
Attending: FAMILY MEDICINE
Payer: COMMERCIAL

## 2020-07-15 VITALS
DIASTOLIC BLOOD PRESSURE: 82 MMHG | WEIGHT: 157.8 LBS | HEART RATE: 71 BPM | SYSTOLIC BLOOD PRESSURE: 134 MMHG | BODY MASS INDEX: 25.36 KG/M2 | TEMPERATURE: 97.1 F | HEIGHT: 66 IN | OXYGEN SATURATION: 100 % | RESPIRATION RATE: 16 BRPM

## 2020-07-15 DIAGNOSIS — Z29.9 PROPHYLACTIC MEASURE: ICD-10-CM

## 2020-07-15 DIAGNOSIS — S93.491D TEAR OF TALOFIBULAR LIGAMENT OF RIGHT LOWER EXTREMITY, SUBSEQUENT ENCOUNTER: ICD-10-CM

## 2020-07-15 DIAGNOSIS — M25.571 RIGHT ANKLE PAIN, UNSPECIFIED CHRONICITY: ICD-10-CM

## 2020-07-15 DIAGNOSIS — Z01.818 PREOPERATIVE EXAMINATION: Primary | ICD-10-CM

## 2020-07-15 DIAGNOSIS — Z13.220 SCREENING FOR HYPERLIPIDEMIA: ICD-10-CM

## 2020-07-15 LAB
ALBUMIN SERPL-MCNC: 4.8 G/DL (ref 3.5–5.7)
ALBUMIN UR-MCNC: NEGATIVE MG/DL
ALP SERPL-CCNC: 56 U/L (ref 34–104)
ALT SERPL W P-5'-P-CCNC: 15 U/L (ref 7–52)
ANION GAP SERPL CALCULATED.3IONS-SCNC: 8 MMOL/L (ref 3–14)
APPEARANCE UR: CLEAR
AST SERPL W P-5'-P-CCNC: 18 U/L (ref 13–39)
BASOPHILS # BLD AUTO: 0.1 10E9/L (ref 0–0.2)
BASOPHILS NFR BLD AUTO: 0.8 %
BILIRUB SERPL-MCNC: 0.8 MG/DL (ref 0.3–1)
BILIRUB UR QL STRIP: NEGATIVE
BUN SERPL-MCNC: 12 MG/DL (ref 7–25)
CALCIUM SERPL-MCNC: 9.7 MG/DL (ref 8.6–10.3)
CHLORIDE SERPL-SCNC: 101 MMOL/L (ref 98–107)
CHOLEST SERPL-MCNC: 159 MG/DL
CO2 SERPL-SCNC: 31 MMOL/L (ref 21–31)
COLOR UR AUTO: YELLOW
CREAT SERPL-MCNC: 0.91 MG/DL (ref 0.6–1.2)
DIFFERENTIAL METHOD BLD: NORMAL
EOSINOPHIL # BLD AUTO: 0.1 10E9/L (ref 0–0.7)
EOSINOPHIL NFR BLD AUTO: 0.9 %
ERYTHROCYTE [DISTWIDTH] IN BLOOD BY AUTOMATED COUNT: 12.4 % (ref 10–15)
GFR SERPL CREATININE-BSD FRML MDRD: 73 ML/MIN/{1.73_M2}
GLUCOSE SERPL-MCNC: 97 MG/DL (ref 70–105)
GLUCOSE UR STRIP-MCNC: NEGATIVE MG/DL
HCT VFR BLD AUTO: 44.6 % (ref 35–47)
HDLC SERPL-MCNC: 55 MG/DL (ref 23–92)
HGB BLD-MCNC: 15 G/DL (ref 11.7–15.7)
HGB UR QL STRIP: ABNORMAL
IMM GRANULOCYTES # BLD: 0 10E9/L (ref 0–0.4)
IMM GRANULOCYTES NFR BLD: 0.4 %
KETONES UR STRIP-MCNC: NEGATIVE MG/DL
LDLC SERPL CALC-MCNC: 63 MG/DL
LEUKOCYTE ESTERASE UR QL STRIP: NEGATIVE
LYMPHOCYTES # BLD AUTO: 1.7 10E9/L (ref 0.8–5.3)
LYMPHOCYTES NFR BLD AUTO: 22.8 %
MCH RBC QN AUTO: 31.8 PG (ref 26.5–33)
MCHC RBC AUTO-ENTMCNC: 33.6 G/DL (ref 31.5–36.5)
MCV RBC AUTO: 95 FL (ref 78–100)
MONOCYTES # BLD AUTO: 0.6 10E9/L (ref 0–1.3)
MONOCYTES NFR BLD AUTO: 8.3 %
NEUTROPHILS # BLD AUTO: 5 10E9/L (ref 1.6–8.3)
NEUTROPHILS NFR BLD AUTO: 66.8 %
NITRATE UR QL: NEGATIVE
NONHDLC SERPL-MCNC: 104 MG/DL
PH UR STRIP: 7.5 PH (ref 5–9)
PLATELET # BLD AUTO: 239 10E9/L (ref 150–450)
POTASSIUM SERPL-SCNC: 3.8 MMOL/L (ref 3.5–5.1)
PROT SERPL-MCNC: 7.5 G/DL (ref 6.4–8.9)
RBC # BLD AUTO: 4.72 10E12/L (ref 3.8–5.2)
RBC #/AREA URNS AUTO: NORMAL /HPF
SODIUM SERPL-SCNC: 140 MMOL/L (ref 134–144)
SOURCE: ABNORMAL
SP GR UR STRIP: 1.01 (ref 1–1.03)
TRIGL SERPL-MCNC: 205 MG/DL
UROBILINOGEN UR STRIP-ACNC: 0.2 EU/DL (ref 0.2–1)
WBC # BLD AUTO: 7.5 10E9/L (ref 4–11)
WBC #/AREA URNS AUTO: NORMAL /HPF

## 2020-07-15 PROCEDURE — 80053 COMPREHEN METABOLIC PANEL: CPT | Mod: ZL | Performed by: FAMILY MEDICINE

## 2020-07-15 PROCEDURE — 85025 COMPLETE CBC W/AUTO DIFF WBC: CPT | Mod: ZL | Performed by: FAMILY MEDICINE

## 2020-07-15 PROCEDURE — G0463 HOSPITAL OUTPT CLINIC VISIT: HCPCS

## 2020-07-15 PROCEDURE — 81001 URINALYSIS AUTO W/SCOPE: CPT | Mod: ZL | Performed by: FAMILY MEDICINE

## 2020-07-15 PROCEDURE — 36415 COLL VENOUS BLD VENIPUNCTURE: CPT | Mod: ZL | Performed by: FAMILY MEDICINE

## 2020-07-15 PROCEDURE — 80061 LIPID PANEL: CPT | Mod: ZL | Performed by: FAMILY MEDICINE

## 2020-07-15 PROCEDURE — 99214 OFFICE O/P EST MOD 30 MIN: CPT | Performed by: FAMILY MEDICINE

## 2020-07-15 ASSESSMENT — ANXIETY QUESTIONNAIRES
3. WORRYING TOO MUCH ABOUT DIFFERENT THINGS: NEARLY EVERY DAY
GAD7 TOTAL SCORE: 21
1. FEELING NERVOUS, ANXIOUS, OR ON EDGE: NEARLY EVERY DAY
5. BEING SO RESTLESS THAT IT IS HARD TO SIT STILL: NEARLY EVERY DAY
IF YOU CHECKED OFF ANY PROBLEMS ON THIS QUESTIONNAIRE, HOW DIFFICULT HAVE THESE PROBLEMS MADE IT FOR YOU TO DO YOUR WORK, TAKE CARE OF THINGS AT HOME, OR GET ALONG WITH OTHER PEOPLE: SOMEWHAT DIFFICULT
7. FEELING AFRAID AS IF SOMETHING AWFUL MIGHT HAPPEN: NEARLY EVERY DAY
2. NOT BEING ABLE TO STOP OR CONTROL WORRYING: NEARLY EVERY DAY
6. BECOMING EASILY ANNOYED OR IRRITABLE: NEARLY EVERY DAY

## 2020-07-15 ASSESSMENT — PATIENT HEALTH QUESTIONNAIRE - PHQ9
SUM OF ALL RESPONSES TO PHQ QUESTIONS 1-9: 14
5. POOR APPETITE OR OVEREATING: NEARLY EVERY DAY

## 2020-07-15 ASSESSMENT — PAIN SCALES - GENERAL: PAINLEVEL: EXTREME PAIN (8)

## 2020-07-15 ASSESSMENT — MIFFLIN-ST. JEOR: SCORE: 1456.5

## 2020-07-15 NOTE — NURSING NOTE
"Chief Complaint   Patient presents with     Pre-Op Exam     surgery date 08/06/2020       Initial /82   Pulse 71   Temp 97.1  F (36.2  C) (Temporal)   Resp 16   Ht 1.683 m (5' 6.25\")   Wt 71.6 kg (157 lb 12.8 oz)   LMP 07/01/2018 (LMP Unknown)   SpO2 100%   Breastfeeding No   BMI 25.28 kg/m   Estimated body mass index is 25.28 kg/m  as calculated from the following:    Height as of this encounter: 1.683 m (5' 6.25\").    Weight as of this encounter: 71.6 kg (157 lb 12.8 oz).  Medication Reconciliation: complete    Jessica Guerrero LPN     Date of Surgery: 08/06/2020  Type of Surgery: Right ankle scope  Surgeon: Dr Salcedo  [facility]:  [facility]    Fever/Chills or other infectious symptoms in past month: no  >10lb weight loss in past two months: no    Health Care Directive/Code status:  no  Hx of blood transfusions:   no   Td up to date:  yes  History of VRE/MRSA:  no     Preoperative Evaluation: Obstructive Sleep Apnea screening    S:Snore -  Do you snore loudly? (louder than talking or loud enough to be heard through closed doors) no  T: Tired - Do you often feel tired, fatigued, or sleepy during the daytime? yes  O: Observed - Has anyone ever observed you stop breathing during your sleep? no  P: Pressure - Do you have or are you being treated for high blood pressure? no  B: BMI - BMI greater than 35kg/m2? no  A: Age - Age over 50 years old? no  N: Neck - Neck circumference greater than 40 cm? no  G: Gender - Gender: Male?no    Total number of \"YES\" responses:  1    Scoring: Low risk of OZIEL 0-2  At Risk of OZIEL: >3 High Risk of OZIEL: 5-8    Jessica Guerrero LPN, LPN........................7/15/2020  3:05 PM            "

## 2020-07-15 NOTE — PATIENT INSTRUCTIONS
Come in for COVID test on August 3, 2020 in the morning.  Come to the parking lot and call [phone] and someone will come out and swab you.

## 2020-07-15 NOTE — H&P (VIEW-ONLY)
"Nursing Notes:   Jessica Guerrero LPN  7/15/2020  3:07 PM  Signed  Chief Complaint   Patient presents with     Pre-Op Exam     surgery date 08/06/2020       Initial /82   Pulse 71   Temp 97.1  F (36.2  C) (Temporal)   Resp 16   Ht 1.683 m (5' 6.25\")   Wt 71.6 kg (157 lb 12.8 oz)   LMP 07/01/2018 (LMP Unknown)   SpO2 100%   Breastfeeding No   BMI 25.28 kg/m   Estimated body mass index is 25.28 kg/m  as calculated from the following:    Height as of this encounter: 1.683 m (5' 6.25\").    Weight as of this encounter: 71.6 kg (157 lb 12.8 oz).  Medication Reconciliation: complete    Jessica Guerrero LPN     Date of Surgery: 08/06/2020  Type of Surgery: Right ankle scope  Surgeon: Dr Salcedo  Hospital:  [facility]    Fever/Chills or other infectious symptoms in past month: no  >10lb weight loss in past two months: no    Health Care Directive/Code status:  no  Hx of blood transfusions:   no   Td up to date:  yes  History of VRE/MRSA:  no     Preoperative Evaluation: Obstructive Sleep Apnea screening    S:Snore -  Do you snore loudly? (louder than talking or loud enough to be heard through closed doors) no  T: Tired - Do you often feel tired, fatigued, or sleepy during the daytime? yes  O: Observed - Has anyone ever observed you stop breathing during your sleep? no  P: Pressure - Do you have or are you being treated for high blood pressure? no  B: BMI - BMI greater than 35kg/m2? no  A: Age - Age over 50 years old? no  N: Neck - Neck circumference greater than 40 cm? no  G: Gender - Gender: Male?no    Total number of \"YES\" responses:  1    Scoring: Low risk of OZIEL 0-2  At Risk of OZIEL: >3 High Risk of OZIEL: 5-8    Jessica Guerrero LPN, LEX........................7/15/2020  3:05 PM              ----------------- PREOPERATIVE EXAM ------------------  7/15/2020    SUBJECTIVE:  Yohana Robertson is a 29 year old female here for preoperative optimization.    Preoperative risk assessment consultation was " requested on Yohana Robertson by Dr. Salcedo prior to right ankle arthroscopy with ligament repair.   This is scheduled for August 6, 2020 at [facility]. I am asked to see this patient for preoperative clearance prior to this procedure.     She has no longer smoking but vapes very minimally and is in the process of quitting that.    Patient Active Problem List    Diagnosis Date Noted     Tear of talofibular ligament of right lower extremity, subsequent encounter 06/26/2020     Priority: Medium     Added automatically from request for surgery [id number]       S/P laparoscopic hysterectomy 07/26/2018     Priority: Medium     Former smoker 07/03/2018     Priority: Medium     Asthma 02/11/2018     Priority: Medium     Attention deficit disorder 02/11/2018     Priority: Medium     Acute bilateral low back pain with sciatica 07/27/2017     Priority: Medium     Anxiety 01/09/2014     Priority: Medium     Severe episode of recurrent major depressive disorder (H) 12/26/2013     Priority: Medium     Nonallopathic lesion of lumbar region 08/08/2013     Priority: Medium     IBS (irritable bowel syndrome) 03/28/2013     Priority: Medium     Bunion, left foot 04/11/2012     Priority: Medium     Common migraine 04/20/2011     Priority: Medium       Past Medical History:   Diagnosis Date     Bunion of great toe of left foot     No Comments Provided     Contraceptive management     started on ALYSSIA     Endometriosis 07/2018    diagnosed laparoscopically       Past Surgical History:   Procedure Laterality Date     BUNIONECTOMY      4/2005,Left     COLONOSCOPY      7/2013,Normal     CYSTOSCOPY N/A 7/26/2018    Procedure: CYSTOSCOPY;;  Surgeon: Renate Ch MD;  Location:  OR     DENTAL SURGERY      wisdom teeth     FRACTURE SURGERY      6/13,L Foot-tarsometatarsal realignment arthrodesis with plate and screw fixation, capsule tendon balancing procedures about the first tarsometatarsal jointLandry  [facility]     hardware removal Left     7/26/2006,349513,REMOVAL OF FOREIGN BODY,Removal of harware L foot after bunionectomy [Other][[[[     LAPAROSCOPIC HYSTERECTOMY TOTAL, SALPINGECTOMY N/A 7/26/2018    Procedure: LAPAROSCOPIC HYSTERECTOMY TOTAL, SALPINGECTOMY;  Total Laparoscopic Hysterectomy & Bilateral Salpingectomy, Cystoscopy.;  Surgeon: Renate Ch MD;  Location: [facility] OR     TONSILLECTOMY      12/2005       Family History   Problem Relation Age of Onset     Heart Disease Mother         Heart Disease,Aortic stenosis and valve replaced     Family History Negative Father         Good Health     Heart Disease Maternal Grandfather         Heart Disease     Asthma Maternal Grandmother         Asthma       Social History     Tobacco Use     Smoking status: Current Every Day Smoker     Years: 9.00     Types: Cigarettes, Vaping Device     Smokeless tobacco: Never Used     Tobacco comment: using e-cig   Substance Use Topics     Alcohol use: Yes     Alcohol/week: 21.0 standard drinks     Comment: couple of brandys a night     Drug use: No     Comment: Drug use: No       Current Outpatient Medications   Medication Sig Dispense Refill     ibuprofen (ADVIL/MOTRIN) 600 MG tablet Take 600 mg by mouth 4 times daily as needed for pain Max dose 3200 mg in 24 hrs         Allergies:  Allergies   Allergen Reactions     Codeine Hives and Shortness Of Breath     Peanuts [Nuts] Shortness Of Breath     Hives       ROS:    Surgical:  patient denies previous complications from prior surgeries including but not limited to prolonged bleeding, anesthesia complications, dysrhythmias, surgical wound infections, or prolonged hospital stay.    Denies family hx of bleeding tendencies, anesthesia complications, or other problems with surgery.     -------------------------------------------------------------    PHYSICAL EXAM:  /82   Pulse 71   Temp 97.1  F (36.2  C) (Temporal)   Resp 16   Ht 1.683 m (5'  "6.25\")   Wt 71.6 kg (157 lb 12.8 oz)   LMP 07/01/2018 (LMP Unknown)   SpO2 100%   Breastfeeding No   BMI 25.28 kg/m      EXAM:  General Appearance: Pleasant, alert, appropriate appearance for age. No acute distress  Head Exam: Normal. Normocephalic, atraumatic.  Eyes: PERRL, EOMI  Ears: Normal TM's bilaterally. Normal auditory canals and external ears.   OroPharynx: Normal buccal mucosa. Normal pharynx.  Neck: Supple, no masses or nodes, no lymphadenopathy.  No thyromegaly.  Lungs: Normal chest wall and respirations. Clear to auscultation, no wheezes or crackles.  Cardiovascular: Regular rate and rhythm. S1, S2, no murmurs.  Gastrointestinal: Soft, nontender, no abnormal masses or organomegaly. BS normal   Musculoskeletal: No edema.  Skin: no concerning or new rashes.  Neurologic Exam: CN 2-12 grossly intact.  Normal gait.  Normal gross motor movement, tone, and coordination. No tremor.  Psychiatric Exam: Alert and oriented, appropriate affect.      EKG:  Not indicated.  ---------------------------------------------------------------  LABS  Results for orders placed or performed in visit on 07/15/20   *UA reflex to Microscopic     Status: Abnormal   Result Value Ref Range    Color Urine Yellow     Appearance Urine Clear     Glucose Urine Negative NEG^Negative mg/dL    Bilirubin Urine Negative NEG^Negative    Ketones Urine Negative NEG^Negative mg/dL    Specific Gravity Urine 1.010 1.000 - 1.030    Blood Urine Trace (A) NEG^Negative    pH Urine 7.5 5.0 - 9.0 pH    Protein Albumin Urine Negative NEG^Negative mg/dL    Urobilinogen Urine 0.2 0.2 - 1.0 EU/dL    Nitrite Urine Negative NEG^Negative    Leukocyte Esterase Urine Negative NEG^Negative    Source Midstream Urine    Lipid Profile     Status: Abnormal   Result Value Ref Range    Cholesterol 159 <200 mg/dL    Triglycerides 205 (H) <150 mg/dL    HDL Cholesterol 55 23 - 92 mg/dL    LDL Cholesterol Calculated 63 <100 mg/dL    Non HDL Cholesterol 104 <130 mg/dL "   Comprehensive metabolic panel     Status: None   Result Value Ref Range    Sodium 140 134 - 144 mmol/L    Potassium 3.8 3.5 - 5.1 mmol/L    Chloride 101 98 - 107 mmol/L    Carbon Dioxide 31 21 - 31 mmol/L    Anion Gap 8 3 - 14 mmol/L    Glucose 97 70 - 105 mg/dL    Urea Nitrogen 12 7 - 25 mg/dL    Creatinine 0.91 0.60 - 1.20 mg/dL    GFR Estimate 73 >60 mL/min/[1.73_m2]    GFR Estimate If Black 88 >60 mL/min/[1.73_m2]    Calcium 9.7 8.6 - 10.3 mg/dL    Bilirubin Total 0.8 0.3 - 1.0 mg/dL    Albumin 4.8 3.5 - 5.7 g/dL    Protein Total 7.5 6.4 - 8.9 g/dL    Alkaline Phosphatase 56 34 - 104 U/L    ALT 15 7 - 52 U/L    AST 18 13 - 39 U/L   CBC with platelets differential     Status: None   Result Value Ref Range    WBC 7.5 4.0 - 11.0 10e9/L    RBC Count 4.72 3.8 - 5.2 10e12/L    Hemoglobin 15.0 11.7 - 15.7 g/dL    Hematocrit 44.6 35.0 - 47.0 %    MCV 95 78 - 100 fl    MCH 31.8 26.5 - 33.0 pg    MCHC 33.6 31.5 - 36.5 g/dL    RDW 12.4 10.0 - 15.0 %    Platelet Count 239 150 - 450 10e9/L    Diff Method Automated Method     % Neutrophils 66.8 %    % Lymphocytes 22.8 %    % Monocytes 8.3 %    % Eosinophils 0.9 %    % Basophils 0.8 %    % Immature Granulocytes 0.4 %    Absolute Neutrophil 5.0 1.6 - 8.3 10e9/L    Absolute Lymphocytes 1.7 0.8 - 5.3 10e9/L    Absolute Monocytes 0.6 0.0 - 1.3 10e9/L    Absolute Eosinophils 0.1 0.0 - 0.7 10e9/L    Absolute Basophils 0.1 0.0 - 0.2 10e9/L    Abs Immature Granulocytes 0.0 0 - 0.4 10e9/L   Urine Microscopic     Status: None   Result Value Ref Range    WBC Urine 0 - 5 OTO5^0 - 5 /HPF    RBC Urine O - 2 OTO2^O - 2 /HPF       ASSESSEMENT AND PLAN:    Yohana was seen today for pre-op exam.    Diagnoses and all orders for this visit:    Preoperative examination  -     CBC with platelets differential; Future  -     Comprehensive metabolic panel; Future  -     *UA reflex to Microscopic  -     Comprehensive metabolic panel  -     CBC with platelets differential  -     Urine  Microscopic    Tear of talofibular ligament of right lower extremity, subsequent encounter    Right ankle pain, unspecified chronicity    Screening for hyperlipidemia  -     Lipid Profile; Future  -     Lipid Profile    Prophylactic measure  -     Asymptomatic COVID-19 Virus (Coronavirus) by PCR; Future        PRE OP RECOMMENDATIONS:  Patient is on chronic pain medications no   Patient is on antiplatlet/anticoagulation medication no  Other medications that need adjustment perioperatively no    Other:  Patient was advised to call our office and the surgical services with any change in condition or new symptoms if they were to develop between today and their surgical date.  Especially any cardiopulmonary symptoms or symptoms concerning for an infection.    She should be at no increased risk for the planned procedure.  COVID testing is ordered preoperatively.    Oscar Robertson MD 7/15/2020

## 2020-07-15 NOTE — PROGRESS NOTES
"Nursing Notes:   Jessica Guerrero LPN  7/15/2020  3:07 PM  Signed  Chief Complaint   Patient presents with     Pre-Op Exam     surgery date 08/06/2020       Initial /82   Pulse 71   Temp 97.1  F (36.2  C) (Temporal)   Resp 16   Ht 1.683 m (5' 6.25\")   Wt 71.6 kg (157 lb 12.8 oz)   LMP 07/01/2018 (LMP Unknown)   SpO2 100%   Breastfeeding No   BMI 25.28 kg/m   Estimated body mass index is 25.28 kg/m  as calculated from the following:    Height as of this encounter: 1.683 m (5' 6.25\").    Weight as of this encounter: 71.6 kg (157 lb 12.8 oz).  Medication Reconciliation: complete    Jessica Guerrero LPN     Date of Surgery: 08/06/2020  Type of Surgery: Right ankle scope  Surgeon: Dr Salcedo  Hospital:  [facility]    Fever/Chills or other infectious symptoms in past month: no  >10lb weight loss in past two months: no    Health Care Directive/Code status:  no  Hx of blood transfusions:   no   Td up to date:  yes  History of VRE/MRSA:  no     Preoperative Evaluation: Obstructive Sleep Apnea screening    S:Snore -  Do you snore loudly? (louder than talking or loud enough to be heard through closed doors) no  T: Tired - Do you often feel tired, fatigued, or sleepy during the daytime? yes  O: Observed - Has anyone ever observed you stop breathing during your sleep? no  P: Pressure - Do you have or are you being treated for high blood pressure? no  B: BMI - BMI greater than 35kg/m2? no  A: Age - Age over 50 years old? no  N: Neck - Neck circumference greater than 40 cm? no  G: Gender - Gender: Male?no    Total number of \"YES\" responses:  1    Scoring: Low risk of OZIEL 0-2  At Risk of OZIEL: >3 High Risk of OZIEL: 5-8    Jessica Guerrero LPN, LEX........................7/15/2020  3:05 PM              ----------------- PREOPERATIVE EXAM ------------------  7/15/2020    SUBJECTIVE:  Yohana Robertson is a 29 year old female here for preoperative optimization.    Preoperative risk assessment consultation was " requested on Yohana Robertson by Dr. Salcedo prior to right ankle arthroscopy with ligament repair.   This is scheduled for August 6, 2020 at [facility]. I am asked to see this patient for preoperative clearance prior to this procedure.     She has no longer smoking but vapes very minimally and is in the process of quitting that.    Patient Active Problem List    Diagnosis Date Noted     Tear of talofibular ligament of right lower extremity, subsequent encounter 06/26/2020     Priority: Medium     Added automatically from request for surgery [id number]       S/P laparoscopic hysterectomy 07/26/2018     Priority: Medium     Former smoker 07/03/2018     Priority: Medium     Asthma 02/11/2018     Priority: Medium     Attention deficit disorder 02/11/2018     Priority: Medium     Acute bilateral low back pain with sciatica 07/27/2017     Priority: Medium     Anxiety 01/09/2014     Priority: Medium     Severe episode of recurrent major depressive disorder (H) 12/26/2013     Priority: Medium     Nonallopathic lesion of lumbar region 08/08/2013     Priority: Medium     IBS (irritable bowel syndrome) 03/28/2013     Priority: Medium     Bunion, left foot 04/11/2012     Priority: Medium     Common migraine 04/20/2011     Priority: Medium       Past Medical History:   Diagnosis Date     Bunion of great toe of left foot     No Comments Provided     Contraceptive management     started on ALYSSIA     Endometriosis 07/2018    diagnosed laparoscopically       Past Surgical History:   Procedure Laterality Date     BUNIONECTOMY      4/2005,Left     COLONOSCOPY      7/2013,Normal     CYSTOSCOPY N/A 7/26/2018    Procedure: CYSTOSCOPY;;  Surgeon: Renate Ch MD;  Location:  OR     DENTAL SURGERY      wisdom teeth     FRACTURE SURGERY      6/13,L Foot-tarsometatarsal realignment arthrodesis with plate and screw fixation, capsule tendon balancing procedures about the first tarsometatarsal jointLandry  [facility]     hardware removal Left     7/26/2006,131243,REMOVAL OF FOREIGN BODY,Removal of harware L foot after bunionectomy [Other][[[[     LAPAROSCOPIC HYSTERECTOMY TOTAL, SALPINGECTOMY N/A 7/26/2018    Procedure: LAPAROSCOPIC HYSTERECTOMY TOTAL, SALPINGECTOMY;  Total Laparoscopic Hysterectomy & Bilateral Salpingectomy, Cystoscopy.;  Surgeon: Renate Ch MD;  Location: [facility] OR     TONSILLECTOMY      12/2005       Family History   Problem Relation Age of Onset     Heart Disease Mother         Heart Disease,Aortic stenosis and valve replaced     Family History Negative Father         Good Health     Heart Disease Maternal Grandfather         Heart Disease     Asthma Maternal Grandmother         Asthma       Social History     Tobacco Use     Smoking status: Current Every Day Smoker     Years: 9.00     Types: Cigarettes, Vaping Device     Smokeless tobacco: Never Used     Tobacco comment: using e-cig   Substance Use Topics     Alcohol use: Yes     Alcohol/week: 21.0 standard drinks     Comment: couple of brandys a night     Drug use: No     Comment: Drug use: No       Current Outpatient Medications   Medication Sig Dispense Refill     ibuprofen (ADVIL/MOTRIN) 600 MG tablet Take 600 mg by mouth 4 times daily as needed for pain Max dose 3200 mg in 24 hrs         Allergies:  Allergies   Allergen Reactions     Codeine Hives and Shortness Of Breath     Peanuts [Nuts] Shortness Of Breath     Hives       ROS:    Surgical:  patient denies previous complications from prior surgeries including but not limited to prolonged bleeding, anesthesia complications, dysrhythmias, surgical wound infections, or prolonged hospital stay.    Denies family hx of bleeding tendencies, anesthesia complications, or other problems with surgery.     -------------------------------------------------------------    PHYSICAL EXAM:  /82   Pulse 71   Temp 97.1  F (36.2  C) (Temporal)   Resp 16   Ht 1.683 m (5'  "6.25\")   Wt 71.6 kg (157 lb 12.8 oz)   LMP 07/01/2018 (LMP Unknown)   SpO2 100%   Breastfeeding No   BMI 25.28 kg/m      EXAM:  General Appearance: Pleasant, alert, appropriate appearance for age. No acute distress  Head Exam: Normal. Normocephalic, atraumatic.  Eyes: PERRL, EOMI  Ears: Normal TM's bilaterally. Normal auditory canals and external ears.   OroPharynx: Normal buccal mucosa. Normal pharynx.  Neck: Supple, no masses or nodes, no lymphadenopathy.  No thyromegaly.  Lungs: Normal chest wall and respirations. Clear to auscultation, no wheezes or crackles.  Cardiovascular: Regular rate and rhythm. S1, S2, no murmurs.  Gastrointestinal: Soft, nontender, no abnormal masses or organomegaly. BS normal   Musculoskeletal: No edema.  Skin: no concerning or new rashes.  Neurologic Exam: CN 2-12 grossly intact.  Normal gait.  Normal gross motor movement, tone, and coordination. No tremor.  Psychiatric Exam: Alert and oriented, appropriate affect.      EKG:  Not indicated.  ---------------------------------------------------------------  LABS  Results for orders placed or performed in visit on 07/15/20   *UA reflex to Microscopic     Status: Abnormal   Result Value Ref Range    Color Urine Yellow     Appearance Urine Clear     Glucose Urine Negative NEG^Negative mg/dL    Bilirubin Urine Negative NEG^Negative    Ketones Urine Negative NEG^Negative mg/dL    Specific Gravity Urine 1.010 1.000 - 1.030    Blood Urine Trace (A) NEG^Negative    pH Urine 7.5 5.0 - 9.0 pH    Protein Albumin Urine Negative NEG^Negative mg/dL    Urobilinogen Urine 0.2 0.2 - 1.0 EU/dL    Nitrite Urine Negative NEG^Negative    Leukocyte Esterase Urine Negative NEG^Negative    Source Midstream Urine    Lipid Profile     Status: Abnormal   Result Value Ref Range    Cholesterol 159 <200 mg/dL    Triglycerides 205 (H) <150 mg/dL    HDL Cholesterol 55 23 - 92 mg/dL    LDL Cholesterol Calculated 63 <100 mg/dL    Non HDL Cholesterol 104 <130 mg/dL "   Comprehensive metabolic panel     Status: None   Result Value Ref Range    Sodium 140 134 - 144 mmol/L    Potassium 3.8 3.5 - 5.1 mmol/L    Chloride 101 98 - 107 mmol/L    Carbon Dioxide 31 21 - 31 mmol/L    Anion Gap 8 3 - 14 mmol/L    Glucose 97 70 - 105 mg/dL    Urea Nitrogen 12 7 - 25 mg/dL    Creatinine 0.91 0.60 - 1.20 mg/dL    GFR Estimate 73 >60 mL/min/[1.73_m2]    GFR Estimate If Black 88 >60 mL/min/[1.73_m2]    Calcium 9.7 8.6 - 10.3 mg/dL    Bilirubin Total 0.8 0.3 - 1.0 mg/dL    Albumin 4.8 3.5 - 5.7 g/dL    Protein Total 7.5 6.4 - 8.9 g/dL    Alkaline Phosphatase 56 34 - 104 U/L    ALT 15 7 - 52 U/L    AST 18 13 - 39 U/L   CBC with platelets differential     Status: None   Result Value Ref Range    WBC 7.5 4.0 - 11.0 10e9/L    RBC Count 4.72 3.8 - 5.2 10e12/L    Hemoglobin 15.0 11.7 - 15.7 g/dL    Hematocrit 44.6 35.0 - 47.0 %    MCV 95 78 - 100 fl    MCH 31.8 26.5 - 33.0 pg    MCHC 33.6 31.5 - 36.5 g/dL    RDW 12.4 10.0 - 15.0 %    Platelet Count 239 150 - 450 10e9/L    Diff Method Automated Method     % Neutrophils 66.8 %    % Lymphocytes 22.8 %    % Monocytes 8.3 %    % Eosinophils 0.9 %    % Basophils 0.8 %    % Immature Granulocytes 0.4 %    Absolute Neutrophil 5.0 1.6 - 8.3 10e9/L    Absolute Lymphocytes 1.7 0.8 - 5.3 10e9/L    Absolute Monocytes 0.6 0.0 - 1.3 10e9/L    Absolute Eosinophils 0.1 0.0 - 0.7 10e9/L    Absolute Basophils 0.1 0.0 - 0.2 10e9/L    Abs Immature Granulocytes 0.0 0 - 0.4 10e9/L   Urine Microscopic     Status: None   Result Value Ref Range    WBC Urine 0 - 5 OTO5^0 - 5 /HPF    RBC Urine O - 2 OTO2^O - 2 /HPF       ASSESSEMENT AND PLAN:    Yohana was seen today for pre-op exam.    Diagnoses and all orders for this visit:    Preoperative examination  -     CBC with platelets differential; Future  -     Comprehensive metabolic panel; Future  -     *UA reflex to Microscopic  -     Comprehensive metabolic panel  -     CBC with platelets differential  -     Urine  Microscopic    Tear of talofibular ligament of right lower extremity, subsequent encounter    Right ankle pain, unspecified chronicity    Screening for hyperlipidemia  -     Lipid Profile; Future  -     Lipid Profile    Prophylactic measure  -     Asymptomatic COVID-19 Virus (Coronavirus) by PCR; Future        PRE OP RECOMMENDATIONS:  Patient is on chronic pain medications no   Patient is on antiplatlet/anticoagulation medication no  Other medications that need adjustment perioperatively no    Other:  Patient was advised to call our office and the surgical services with any change in condition or new symptoms if they were to develop between today and their surgical date.  Especially any cardiopulmonary symptoms or symptoms concerning for an infection.    She should be at no increased risk for the planned procedure.  COVID testing is ordered preoperatively.    Oscar Robertson MD 7/15/2020

## 2020-07-16 ASSESSMENT — ANXIETY QUESTIONNAIRES: GAD7 TOTAL SCORE: 21

## 2020-08-03 ENCOUNTER — ALLIED HEALTH/NURSE VISIT (OUTPATIENT)
Dept: FAMILY MEDICINE | Facility: OTHER | Age: 30
End: 2020-08-03
Payer: COMMERCIAL

## 2020-08-03 DIAGNOSIS — Z29.9 PROPHYLACTIC MEASURE: ICD-10-CM

## 2020-08-03 PROCEDURE — U0003 INFECTIOUS AGENT DETECTION BY NUCLEIC ACID (DNA OR RNA); SEVERE ACUTE RESPIRATORY SYNDROME CORONAVIRUS 2 (SARS-COV-2) (CORONAVIRUS DISEASE [COVID-19]), AMPLIFIED PROBE TECHNIQUE, MAKING USE OF HIGH THROUGHPUT TECHNOLOGIES AS DESCRIBED BY CMS-2020-01-R: HCPCS | Mod: ZL

## 2020-08-03 PROCEDURE — C9803 HOPD COVID-19 SPEC COLLECT: HCPCS

## 2020-08-03 PROCEDURE — 99207 ZZC NO CHARGE NURSE ONLY: CPT

## 2020-08-04 LAB
LABORATORY COMMENT REPORT: NORMAL
SARS-COV-2 RNA SPEC QL NAA+PROBE: NEGATIVE
SARS-COV-2 RNA SPEC QL NAA+PROBE: NORMAL
SPECIMEN SOURCE: NORMAL
SPECIMEN SOURCE: NORMAL

## 2020-08-05 ENCOUNTER — ANESTHESIA EVENT (OUTPATIENT)
Dept: SURGERY | Facility: OTHER | Age: 30
End: 2020-08-05
Payer: COMMERCIAL

## 2020-08-06 ENCOUNTER — HOSPITAL ENCOUNTER (OUTPATIENT)
Facility: OTHER | Age: 30
Discharge: HOME | End: 2020-08-06
Attending: PODIATRIST | Admitting: PODIATRIST
Payer: COMMERCIAL

## 2020-08-06 ENCOUNTER — ANESTHESIA (OUTPATIENT)
Dept: SURGERY | Facility: OTHER | Age: 30
End: 2020-08-06
Payer: COMMERCIAL

## 2020-08-06 VITALS
WEIGHT: 157.8 LBS | OXYGEN SATURATION: 100 % | HEIGHT: 66 IN | BODY MASS INDEX: 25.36 KG/M2 | TEMPERATURE: 96.6 F | DIASTOLIC BLOOD PRESSURE: 64 MMHG | RESPIRATION RATE: 16 BRPM | SYSTOLIC BLOOD PRESSURE: 120 MMHG | HEART RATE: 46 BPM

## 2020-08-06 DIAGNOSIS — S93.491D TEAR OF TALOFIBULAR LIGAMENT OF RIGHT LOWER EXTREMITY, SUBSEQUENT ENCOUNTER: ICD-10-CM

## 2020-08-06 PROCEDURE — 25800025 ZZH RX 258: Performed by: PODIATRIST

## 2020-08-06 PROCEDURE — C1713 ANCHOR/SCREW BN/BN,TIS/BN: HCPCS | Performed by: PODIATRIST

## 2020-08-06 PROCEDURE — 64445 NJX AA&/STRD SCIATIC NRV IMG: CPT | Mod: RT | Performed by: NURSE ANESTHETIST, CERTIFIED REGISTERED

## 2020-08-06 PROCEDURE — 36000093 ZZH SURGERY LEVEL 4 1ST 30 MIN: Performed by: PODIATRIST

## 2020-08-06 PROCEDURE — 71000027 ZZH RECOVERY PHASE 2 EACH 15 MINS: Performed by: PODIATRIST

## 2020-08-06 PROCEDURE — 36000063 ZZH SURGERY LEVEL 4 EA 15 ADDTL MIN: Performed by: PODIATRIST

## 2020-08-06 PROCEDURE — 27211024 ZZHC OR SUPPLY OTHER OPNP: Performed by: PODIATRIST

## 2020-08-06 PROCEDURE — 27696 REPAIR OF ANKLE LIGAMENTS: CPT | Performed by: NURSE ANESTHETIST, CERTIFIED REGISTERED

## 2020-08-06 PROCEDURE — 25800030 ZZH RX IP 258 OP 636: Performed by: NURSE ANESTHETIST, CERTIFIED REGISTERED

## 2020-08-06 PROCEDURE — 37000009 ZZH ANESTHESIA TECHNICAL FEE, EACH ADDTL 15 MIN: Performed by: PODIATRIST

## 2020-08-06 PROCEDURE — 37000008 ZZH ANESTHESIA TECHNICAL FEE, 1ST 30 MIN: Performed by: PODIATRIST

## 2020-08-06 PROCEDURE — 27210794 ZZH OR GENERAL SUPPLY STERILE: Performed by: PODIATRIST

## 2020-08-06 PROCEDURE — 25000128 H RX IP 250 OP 636: Performed by: PODIATRIST

## 2020-08-06 PROCEDURE — 25000125 ZZHC RX 250: Performed by: NURSE ANESTHETIST, CERTIFIED REGISTERED

## 2020-08-06 PROCEDURE — 27696 REPAIR OF ANKLE LIGAMENTS: CPT | Mod: XU | Performed by: PODIATRIST

## 2020-08-06 PROCEDURE — 71000014 ZZH RECOVERY PHASE 1 LEVEL 2 FIRST HR: Performed by: PODIATRIST

## 2020-08-06 PROCEDURE — 76942 ECHO GUIDE FOR BIOPSY: CPT | Mod: 26 | Performed by: NURSE ANESTHETIST, CERTIFIED REGISTERED

## 2020-08-06 PROCEDURE — 29898 ANKLE ARTHROSCOPY/SURGERY: CPT | Performed by: PODIATRIST

## 2020-08-06 PROCEDURE — 28120 PART REMOVAL OF ANKLE/HEEL: CPT | Performed by: PODIATRIST

## 2020-08-06 PROCEDURE — 28086 EXCISE FOOT TENDON SHEATH: CPT | Mod: XU | Performed by: PODIATRIST

## 2020-08-06 PROCEDURE — 64447 NJX AA&/STRD FEMORAL NRV IMG: CPT | Mod: RT | Performed by: NURSE ANESTHETIST, CERTIFIED REGISTERED

## 2020-08-06 PROCEDURE — 40000306 ZZH STATISTIC PRE PROC ASSESS II: Performed by: PODIATRIST

## 2020-08-06 PROCEDURE — 25000128 H RX IP 250 OP 636: Performed by: NURSE ANESTHETIST, CERTIFIED REGISTERED

## 2020-08-06 DEVICE — IMPLANTABLE DEVICE: Type: IMPLANTABLE DEVICE | Site: ANKLE | Status: FUNCTIONAL

## 2020-08-06 RX ORDER — FENTANYL CITRATE 50 UG/ML
25-50 INJECTION, SOLUTION INTRAMUSCULAR; INTRAVENOUS
Status: DISCONTINUED | OUTPATIENT
Start: 2020-08-06 | End: 2020-08-06 | Stop reason: HOSPADM

## 2020-08-06 RX ORDER — DEXAMETHASONE SODIUM PHOSPHATE 10 MG/ML
INJECTION, SOLUTION INTRAMUSCULAR; INTRAVENOUS PRN
Status: DISCONTINUED | OUTPATIENT
Start: 2020-08-06 | End: 2020-08-06

## 2020-08-06 RX ORDER — PROPOFOL 10 MG/ML
INJECTION, EMULSION INTRAVENOUS PRN
Status: DISCONTINUED | OUTPATIENT
Start: 2020-08-06 | End: 2020-08-06

## 2020-08-06 RX ORDER — SODIUM CHLORIDE, SODIUM LACTATE, POTASSIUM CHLORIDE, CALCIUM CHLORIDE 600; 310; 30; 20 MG/100ML; MG/100ML; MG/100ML; MG/100ML
INJECTION, SOLUTION INTRAVENOUS CONTINUOUS
Status: DISCONTINUED | OUTPATIENT
Start: 2020-08-06 | End: 2020-08-06 | Stop reason: HOSPADM

## 2020-08-06 RX ORDER — ONDANSETRON 2 MG/ML
4 INJECTION INTRAMUSCULAR; INTRAVENOUS EVERY 30 MIN PRN
Status: DISCONTINUED | OUTPATIENT
Start: 2020-08-06 | End: 2020-08-06 | Stop reason: HOSPADM

## 2020-08-06 RX ORDER — ONDANSETRON 4 MG/1
4 TABLET, ORALLY DISINTEGRATING ORAL
Status: DISCONTINUED | OUTPATIENT
Start: 2020-08-06 | End: 2020-08-06 | Stop reason: HOSPADM

## 2020-08-06 RX ORDER — NALOXONE HYDROCHLORIDE 0.4 MG/ML
.1-.4 INJECTION, SOLUTION INTRAMUSCULAR; INTRAVENOUS; SUBCUTANEOUS
Status: DISCONTINUED | OUTPATIENT
Start: 2020-08-06 | End: 2020-08-06 | Stop reason: HOSPADM

## 2020-08-06 RX ORDER — ACETAMINOPHEN 325 MG/1
650 TABLET ORAL EVERY 4 HOURS PRN
Qty: 50 TABLET | Refills: 0 | Status: SHIPPED | OUTPATIENT
Start: 2020-08-06 | End: 2020-10-22

## 2020-08-06 RX ORDER — OXYCODONE HYDROCHLORIDE 5 MG/1
5-10 TABLET ORAL EVERY 4 HOURS PRN
Qty: 16 TABLET | Refills: 0 | Status: SHIPPED | OUTPATIENT
Start: 2020-08-06 | End: 2020-10-22

## 2020-08-06 RX ORDER — ONDANSETRON 2 MG/ML
INJECTION INTRAMUSCULAR; INTRAVENOUS PRN
Status: DISCONTINUED | OUTPATIENT
Start: 2020-08-06 | End: 2020-08-06

## 2020-08-06 RX ORDER — CEFAZOLIN SODIUM 1 G/50ML
1 INJECTION, SOLUTION INTRAVENOUS SEE ADMIN INSTRUCTIONS
Status: DISCONTINUED | OUTPATIENT
Start: 2020-08-06 | End: 2020-08-06 | Stop reason: HOSPADM

## 2020-08-06 RX ORDER — ONDANSETRON 4 MG/1
4 TABLET, ORALLY DISINTEGRATING ORAL EVERY 30 MIN PRN
Status: DISCONTINUED | OUTPATIENT
Start: 2020-08-06 | End: 2020-08-06 | Stop reason: HOSPADM

## 2020-08-06 RX ORDER — CEFAZOLIN SODIUM 2 G/100ML
2 INJECTION, SOLUTION INTRAVENOUS
Status: COMPLETED | OUTPATIENT
Start: 2020-08-06 | End: 2020-08-06

## 2020-08-06 RX ORDER — HYDROXYZINE HYDROCHLORIDE 10 MG/1
20 TABLET, FILM COATED ORAL
Status: DISCONTINUED | OUTPATIENT
Start: 2020-08-06 | End: 2020-08-06 | Stop reason: HOSPADM

## 2020-08-06 RX ORDER — DEXAMETHASONE SODIUM PHOSPHATE 4 MG/ML
INJECTION, SOLUTION INTRA-ARTICULAR; INTRALESIONAL; INTRAMUSCULAR; INTRAVENOUS; SOFT TISSUE PRN
Status: DISCONTINUED | OUTPATIENT
Start: 2020-08-06 | End: 2020-08-06

## 2020-08-06 RX ORDER — PROPOFOL 10 MG/ML
INJECTION, EMULSION INTRAVENOUS CONTINUOUS PRN
Status: DISCONTINUED | OUTPATIENT
Start: 2020-08-06 | End: 2020-08-06

## 2020-08-06 RX ORDER — NEOSTIGMINE METHYLSULFATE 1 MG/ML
VIAL (ML) INJECTION PRN
Status: DISCONTINUED | OUTPATIENT
Start: 2020-08-06 | End: 2020-08-06

## 2020-08-06 RX ORDER — OXYCODONE HYDROCHLORIDE 5 MG/1
5 TABLET ORAL
Status: DISCONTINUED | OUTPATIENT
Start: 2020-08-06 | End: 2020-08-06 | Stop reason: HOSPADM

## 2020-08-06 RX ORDER — FENTANYL CITRATE 50 UG/ML
INJECTION, SOLUTION INTRAMUSCULAR; INTRAVENOUS PRN
Status: DISCONTINUED | OUTPATIENT
Start: 2020-08-06 | End: 2020-08-06

## 2020-08-06 RX ORDER — GLYCOPYRROLATE 0.2 MG/ML
INJECTION, SOLUTION INTRAMUSCULAR; INTRAVENOUS PRN
Status: DISCONTINUED | OUTPATIENT
Start: 2020-08-06 | End: 2020-08-06

## 2020-08-06 RX ORDER — MEPERIDINE HYDROCHLORIDE 50 MG/ML
12.5 INJECTION INTRAMUSCULAR; INTRAVENOUS; SUBCUTANEOUS
Status: DISCONTINUED | OUTPATIENT
Start: 2020-08-06 | End: 2020-08-06 | Stop reason: HOSPADM

## 2020-08-06 RX ORDER — LIDOCAINE HYDROCHLORIDE 20 MG/ML
INJECTION, SOLUTION INFILTRATION; PERINEURAL PRN
Status: DISCONTINUED | OUTPATIENT
Start: 2020-08-06 | End: 2020-08-06

## 2020-08-06 RX ORDER — IBUPROFEN 600 MG/1
600 TABLET, FILM COATED ORAL EVERY 6 HOURS PRN
Qty: 30 TABLET | Refills: 0 | Status: SHIPPED | OUTPATIENT
Start: 2020-08-06 | End: 2020-10-22

## 2020-08-06 RX ORDER — BUPIVACAINE HYDROCHLORIDE 5 MG/ML
INJECTION, SOLUTION EPIDURAL; INTRACAUDAL PRN
Status: DISCONTINUED | OUTPATIENT
Start: 2020-08-06 | End: 2020-08-06

## 2020-08-06 RX ORDER — KETOROLAC TROMETHAMINE 30 MG/ML
INJECTION, SOLUTION INTRAMUSCULAR; INTRAVENOUS PRN
Status: DISCONTINUED | OUTPATIENT
Start: 2020-08-06 | End: 2020-08-06

## 2020-08-06 RX ORDER — ONDANSETRON 4 MG/1
4-8 TABLET, ORALLY DISINTEGRATING ORAL EVERY 8 HOURS PRN
Qty: 4 TABLET | Refills: 0 | Status: SHIPPED | OUTPATIENT
Start: 2020-08-06 | End: 2020-10-22

## 2020-08-06 RX ADMIN — ONDANSETRON 4 MG: 2 INJECTION INTRAMUSCULAR; INTRAVENOUS at 12:18

## 2020-08-06 RX ADMIN — DEXAMETHASONE SODIUM PHOSPHATE 4 MG: 4 INJECTION, SOLUTION INTRA-ARTICULAR; INTRALESIONAL; INTRAMUSCULAR; INTRAVENOUS; SOFT TISSUE at 12:41

## 2020-08-06 RX ADMIN — GLYCOPYRROLATE 0.4 MG: 0.2 INJECTION, SOLUTION INTRAMUSCULAR; INTRAVENOUS at 13:12

## 2020-08-06 RX ADMIN — FENTANYL CITRATE 50 MCG: 50 INJECTION, SOLUTION INTRAMUSCULAR; INTRAVENOUS at 12:22

## 2020-08-06 RX ADMIN — CEFAZOLIN SODIUM 2 G: 2 INJECTION, SOLUTION INTRAVENOUS at 12:10

## 2020-08-06 RX ADMIN — NEOSTIGMINE METHYLSULFATE 3 MG: 1 INJECTION INTRAVENOUS at 13:12

## 2020-08-06 RX ADMIN — LIDOCAINE HYDROCHLORIDE 60 MG: 20 INJECTION, SOLUTION INFILTRATION; PERINEURAL at 12:18

## 2020-08-06 RX ADMIN — PROPOFOL 200 MCG/KG/MIN: 10 INJECTION, EMULSION INTRAVENOUS at 12:20

## 2020-08-06 RX ADMIN — MIDAZOLAM 1 MG: 1 INJECTION INTRAMUSCULAR; INTRAVENOUS at 11:13

## 2020-08-06 RX ADMIN — BUPIVACAINE HYDROCHLORIDE 10 ML: 5 INJECTION, SOLUTION EPIDURAL; INTRACAUDAL; PERINEURAL at 11:24

## 2020-08-06 RX ADMIN — DEXAMETHASONE SODIUM PHOSPHATE 2 MG: 10 INJECTION, SOLUTION INTRAMUSCULAR; INTRAVENOUS at 11:24

## 2020-08-06 RX ADMIN — SODIUM CHLORIDE, POTASSIUM CHLORIDE, SODIUM LACTATE AND CALCIUM CHLORIDE: 600; 310; 30; 20 INJECTION, SOLUTION INTRAVENOUS at 10:13

## 2020-08-06 RX ADMIN — BUPIVACAINE HYDROCHLORIDE 20 ML: 5 INJECTION, SOLUTION EPIDURAL; INTRACAUDAL; PERINEURAL at 11:16

## 2020-08-06 RX ADMIN — KETOROLAC TROMETHAMINE 30 MG: 30 INJECTION, SOLUTION INTRAMUSCULAR at 12:45

## 2020-08-06 RX ADMIN — PROPOFOL 160 MG: 10 INJECTION, EMULSION INTRAVENOUS at 12:18

## 2020-08-06 RX ADMIN — MIDAZOLAM 2 MG: 1 INJECTION INTRAMUSCULAR; INTRAVENOUS at 11:09

## 2020-08-06 ASSESSMENT — MIFFLIN-ST. JEOR: SCORE: 1456.66

## 2020-08-06 ASSESSMENT — LIFESTYLE VARIABLES: TOBACCO_USE: 1

## 2020-08-06 NOTE — ANESTHESIA CARE TRANSFER NOTE
Patient: Yohana Robertson    Procedure(s):  Posterior ARTHROSCOPY, ANKLE, WITH OPEN REPAIR OF ANKLE LIGAMENT, resection of os trigonum, FHL tenosyonovectomy    Diagnosis: Tear of talofibular ligament of right lower extremity, subsequent encounter [S90.963Q]  Diagnosis Additional Information: No value filed.    Anesthesia Type:   General, Peripheral Nerve Block     Note:  Airway :Face Mask  Patient transferred to:Phase II  Handoff Report: Identifed the Patient, Identified the Reponsible Provider, Reviewed the pertinent medical history, Discussed the surgical course, Reviewed Intra-OP anesthesia mangement and issues during anesthesia, Set expectations for post-procedure period and Allowed opportunity for questions and acknowledgement of understanding      Vitals: (Last set prior to Anesthesia Care Transfer)    CRNA VITALS  8/6/2020 1257 - 8/6/2020 1330      8/6/2020             Pulse:  66    Ht Rate:  66    SpO2:  99 %    Resp Rate (set):  10                Electronically Signed By: DALIA GUNTER CRNA  August 6, 2020  1:30 PM

## 2020-08-06 NOTE — OR NURSING
Versed pulled from [employer] by Jessica Herzog RN but given by myself unable to do handoff because Jessica does not have access to dsu charting

## 2020-08-06 NOTE — OP NOTE
Procedure Date: 08/06/2020      SURGEON:  Sean Salcedo DPM      ASSISTANT:  Orion Moreno PA-C.  A skilled first assistant was necessary due to technical complexity of the procedure and for the patient's safety.  The assistant helped with positioning, retraction, visualization of the operative field and moreover helped to complete the procedure in a technically safe and efficient manner.        PREOPERATIVE DIAGNOSIS:  Right ankle pain and instability, painful os trigonum,      POSTOPERATIVE DIAGNOSIS:  Right ankle pain and instability, painful os trigonum,      PROCEDURES:   1.  Ankle scope extensive debridement.   2.  Trigonum resection.   3.  FHL tenosynovectomy.   4.  Open lateral ligament repair, both ligaments.      ANESTHESIA:  General, popliteal saphenous block.      HEMOSTASIS:  Thigh tourniquet, electrocautery.      ESTIMATED BLOOD LOSS:  Minimal.      MATERIALS:  Arthrex FiberTak anchors x 2, Arthrex 2.9 PushLock anchor.      INDICATIONS FOR PROCEDURE:  This is a 29-year-old otherwise healthy female with persistent ankle pain and instability who would like to proceed with surgery after failure of nonoperative management.  We had a detailed discussion of risks, potential complications, alternatives and benefits.        DETAILS OF PROCEDURE:  A prone position was utilized, supine.  General anesthesia and a popliteal saphenous block performed.  Right lower extremity prepped and draped in the usual sterile fashion.  Thigh tourniquet utilized for duration of procedure.      Attention was directed to posterior ankle.  Standard posterior arthroscopic portals established.  A 4-0 scope introduced medially, a 3.5 shaver laterally, and this allowed excellent access to posterior ankle and subtalar joint.  Both were unstable to clinical stress.  Had significant diminutive capsule posteriorly.  It was debrided.  The posterior os trigonum was resected and removed without incident.  Extensive debridement carried out  of capsule otherwise.  I also exposed the FHL tendon.  There was some inflammation distally within the tarsal tunnel.  This was debrided with a radiofrequency Coblation unit.  After extensive debridement carried out, os trigonum removal and FHL tenosynovectomy, the posterior recess was flushed with saline and the arthroscopic port was closed with nonabsorbable suture.      Attention was then directed to the lateral ankle.  Incision made in curvilinear fashion along distal curvature of the fibula.  Deepened using blunt and sharp dissection.  CF ligament transected along distal curvature of the fibula.  Two FiberTak anchors inserted into the fibula after roughening the anterior insertion of ATF, CF ligament for biologic reattachment.  Two FiberTak anchors inserted.  Distal sutures were passed through the CF ligament and peroneal retinaculum in a fashion to imbricate and shorten these ligaments.  Proximal anchor sutures passed through the ATF ligament, joint capsule and tightened down with the ankle held in everted position, effectively tightening the ligamentous complex.  This was then augmented in double row fashion by gathering the suture ends and O2 2.9 PushLock, which was then inserted in the lateral fibula under appropriate tension, effectively augmenting and strengthening this repair.  Flushed with normal saline.  Deep tissue repaired with 2-0 Vicryl, skin with nonabsorbable suture.  Placed in a sterile dressing, posterior sugar-tong splint.  She will be nonweightbearing and follow-up as scheduled.         MELL GONZALEZ DPM             D: 2020   T: 2020   MT:       Name:     AMITA BARRIOS   MRN:      [id number]        Account:        [id number]   :      1990           Procedure Date: 2020      Document: [id number]

## 2020-08-06 NOTE — ANESTHESIA PROCEDURE NOTES
Procedure note :  Staff -       CRNA: Bijan Mosqueda APRN CRNA    Performed By: CRNA        Pre-Procedure    Location: pre-op    Procedure Times:2020 11:09 AM and 2020 11:25 AM  Pre-Anesthestic Checklist: patient identified, IV checked, site marked, risks and benefits discussed, informed consent, monitors and equipment checked, pre-op evaluation, at physician/surgeon's request and post-op pain management    Timeout  Correct Patient: Yes   Correct Procedure: Yes   Correct Site: Yes   Correct Laterality: Yes   Correct Position: Yes   Site Marked: Yes   .        Assessment/Narrative  .  .  . Comments:  Ultrasound Guided Popliteal Sciatic and Saphenous Nerve Block for Post-Op Pain Management    Patient: AMITA BARRIOS  Patient : 1990  Date: 2020  Procedure time:  1109 to 1125  Diagnosis: Ankle pain.    Surgeon requests Popliteal/ Saphenous nerve block for post-op pain management.    The procedure, potential benefits, risks, and alternatives were discussed during the preoperative interview with the patient.  The patient voiced understanding of the information and agreed to proceed with the procedure.    Universal protocol was followed.  TIME OUT conducted just prior to starting procedure confirmed patient identity, site/side, procedure, patient position and availability of correct equipment and implants.    Anesthesia: 0.5% Bupivicaine subcutaneously  Sedation-2mg Versed IV by DSU RN    The right lateral thigh was prepped with chlohexidine.  A 22 gauge 4 inch Stimuplex insulated needle was advanced under ultrasound guidance until adjacent to but not within the sciatic nerve at the level of the nerve bifurcation.  20ml of 0.5% Bupivacaine was injected incrementally with confirmation of negative aspiration. There was good visualization of local anesthetic spread around the sciatic nerve with no signs of intraneural orintravascular injection. No parasthesias were elicited either during  needle placement or medication injection. There were no other immediate complications apparent. An image was saved to the ultrasound machine and a print was made for the chart.    An additional 10cc of 0.5% Bupivicaine with 2mg of PF Decadron was placed around the right saphenous nerve, on the medial aspect of the mid-thigh under the Sartorius muscle, utilizing a 22 gauge 4 inch Stimuplexinsulated needle and ultrasound guidance after the area was prepped with chlorhexidine. No parasthesias were elicited either during needle placement or medication injection.   There were no other immediate complications apparent. An image was saved to the ultrasound machine and a print was made for the chart.    Electronically Signed by  DALIA GUNTER CRNA on 8/6/2020 at 11:35 AM

## 2020-08-06 NOTE — ANESTHESIA PREPROCEDURE EVALUATION
Anesthesia Pre-Procedure Evaluation    Patient: Yohana Robertson   MRN: [id number] : 1990          Preoperative Diagnosis: Tear of talofibular ligament of right lower extremity, subsequent encounter [S93.546W]    Procedure(s):  Posterior ARTHROSCOPY, ANKLE, WITH OPEN REPAIR OF ANKLE LIGAMENT, resection of os trigonum, FHL tenosyonovectomy    Past Medical History:   Diagnosis Date     Bunion of great toe of left foot     No Comments Provided     Contraceptive management     started on ALYSSIA     Endometriosis 2018    diagnosed laparoscopically     Past Surgical History:   Procedure Laterality Date     BUNIONECTOMY      2005,Left     COLONOSCOPY      2013,Normal     CYSTOSCOPY N/A 2018    Procedure: CYSTOSCOPY;;  Surgeon: Renate Ch MD;  Location:  OR     DENTAL SURGERY      wisdom teeth     FRACTURE SURGERY      ,L Foot-tarsometatarsal realignment arthrodesis with plate and screw fixation, capsule tendon balancing procedures about the first tarsometatarsal joint, Landry Gibson DPM  [facility]     hardware removal Left     2006,[id number],REMOVAL OF FOREIGN BODY,Removal of harware L foot after bunionectomy [Other][[[[     LAPAROSCOPIC HYSTERECTOMY TOTAL, SALPINGECTOMY N/A 2018    Procedure: LAPAROSCOPIC HYSTERECTOMY TOTAL, SALPINGECTOMY;  Total Laparoscopic Hysterectomy & Bilateral Salpingectomy, Cystoscopy.;  Surgeon: Renate Ch MD;  Location:  OR     TONSILLECTOMY      2005       Anesthesia Evaluation     . Pt has had prior anesthetic.            ROS/MED HX    ENT/Pulmonary: Comment: Patient however does Vap and is weaning from this.    (+)tobacco use, Past use asthma , . .    Neurologic:  - neg neurologic ROS     Cardiovascular:  - neg cardiovascular ROS       METS/Exercise Tolerance:  >4 METS   Hematologic:  - neg hematologic  ROS       Musculoskeletal: Comment: Tear of talofibular ligament of right lower extremity        GI/Hepatic:  - neg  "GI/hepatic ROS       Renal/Genitourinary:  - ROS Renal section negative       Endo:  - neg endo ROS       Psychiatric:  - neg psychiatric ROS       Infectious Disease:  - neg infectious disease ROS       Malignancy:      - no malignancy   Other:    - neg other ROS                      Physical Exam  Normal systems: cardiovascular and pulmonary    Airway   Mallampati: I  TM distance: >3 FB  Neck ROM: full    Dental   Comment: Upper plate    Cardiovascular   Rhythm and rate: regular and normal      Pulmonary    breath sounds clear to auscultation            Lab Results   Component Value Date    WBC 7.5 07/15/2020    HGB 15.0 07/15/2020    HCT 44.6 07/15/2020     07/15/2020     07/15/2020    POTASSIUM 3.8 07/15/2020    CHLORIDE 101 07/15/2020    CO2 31 07/15/2020    BUN 12 07/15/2020    CR 0.91 07/15/2020    GLC 97 07/15/2020    ZAID 9.7 07/15/2020    ALBUMIN 4.8 07/15/2020    PROTTOTAL 7.5 07/15/2020    ALT 15 07/15/2020    AST 18 07/15/2020    ALKPHOS 56 07/15/2020    BILITOTAL 0.8 07/15/2020    LIPASE 5 (L) 07/10/2019    INR 1.12 07/10/2019    T4 0.82 05/30/2018    HCG Negative 03/01/2020       Preop Vitals  BP Readings from Last 3 Encounters:   08/06/20 125/86   07/15/20 134/82   06/25/20 112/74    Pulse Readings from Last 3 Encounters:   07/15/20 71   06/25/20 64   06/16/20 76      Resp Readings from Last 3 Encounters:   08/06/20 16   07/15/20 16   06/16/20 14    SpO2 Readings from Last 3 Encounters:   08/06/20 100%   07/15/20 100%   06/16/20 99%      Temp Readings from Last 1 Encounters:   08/06/20 97.7  F (36.5  C) (Tympanic)    Ht Readings from Last 1 Encounters:   08/06/20 1.683 m (5' 6.26\")      Wt Readings from Last 1 Encounters:   08/06/20 71.6 kg (157 lb 12.8 oz)    Estimated body mass index is 25.27 kg/m  as calculated from the following:    Height as of this encounter: 1.683 m (5' 6.26\").    Weight as of this encounter: 71.6 kg (157 lb 12.8 oz).       Anesthesia Plan      History & Physical " Review      ASA Status:  2 .    NPO Status:  > 8 hours    Plan for General and Peripheral Nerve Block            Postoperative Care      Consents  Anesthetic plan, risks, benefits and alternatives discussed with:  Patient..                 David Kellerman, APRN CRNA

## 2020-08-06 NOTE — DISCHARGE INSTRUCTIONS
[facility]  Adult Discharge Orders & Instructions      For 24 hours after surgery:  1. Get plenty of rest.  A responsible adult must stay with you for at least 24 hours after you leave the hospital.   2. You may feel lightheaded.  IF so, sit for a few minutes before standing.  Have someone help you get up.   3. You may have a slight fever. Call the doctor if your fever is over 101 F (38.3 C) (taken under the tongue) or lasts longer than 24 hours.  4. You may have a dry mouth, a sore throat, muscle aches or trouble sleeping.  These should go away after 24 hours.  5. Do not make important or legal decisions.  6.   Do not drive or use heavy equipment.  If you have weakness or tingling, don't drive or use heavy equipment until this feeling goes away.      Surgeon Contact Information  If you have questions or concerns related to your procedure please contact your surgeon through [facility] at [phone].

## 2020-08-06 NOTE — ANESTHESIA POSTPROCEDURE EVALUATION
Patient: Yohana Robertson    Procedure(s):  Posterior ARTHROSCOPY, ANKLE, WITH OPEN REPAIR OF ANKLE LIGAMENT, resection of os trigonum, FHL tenosyonovectomy    Diagnosis:Tear of talofibular ligament of right lower extremity, subsequent encounter [S53.157W]  Diagnosis Additional Information: No value filed.    Anesthesia Type:  General, Peripheral Nerve Block    Note:  Anesthesia Post Evaluation    Patient location during evaluation: Phase 2  Patient participation: Able to fully participate in evaluation  Level of consciousness: awake and alert  Pain management: adequate  Airway patency: patent  Cardiovascular status: acceptable  Respiratory status: acceptable  Hydration status: acceptable  PONV: none             Last vitals:  Vitals:    08/06/20 1415 08/06/20 1430 08/06/20 1450   BP: 102/65 100/60 120/64   Pulse: (!) 46 51 (!) 46   Resp:      Temp:      SpO2:   100%         Electronically Signed By: David Kellerman, APRN CRNA  August 6, 2020  4:29 PM

## 2020-08-06 NOTE — ANESTHESIA PROCEDURE NOTES
Airway   Date/Time: 8/6/2020 12:22 PM   Patient location during procedure: OR    General Information and Staff   Performed: CRNA         Indications and Patient Condition  Indications for airway management: johnson-procedural  Induction type:intravenous  Mask difficulty assessment: easy    Final Airway Details  Final airway type: endotracheal airway  Successful airway:ETT  ETT size (mm): 7.0 Cuffed: yes   Blade: Fernandez  Blade size: #3  Successful intubation technique: asleep  Endotracheal tube insertion site: right side of mouth   Measured from: teeth  ETT to teeth (cm): 22  Grade View of Cords: 1Number of attempts at approach: 1  Number of other approaches attempted: 0    Secured with:tape  Ease of procedure: easy  Dentition: Intact

## 2020-08-13 ENCOUNTER — OFFICE VISIT (OUTPATIENT)
Dept: ORTHOPEDICS | Facility: OTHER | Age: 30
End: 2020-08-13
Attending: PODIATRIST
Payer: COMMERCIAL

## 2020-08-13 DIAGNOSIS — Z09 POSTOP CHECK: Primary | ICD-10-CM

## 2020-08-13 PROCEDURE — G0463 HOSPITAL OUTPT CLINIC VISIT: HCPCS

## 2020-08-13 PROCEDURE — 99024 POSTOP FOLLOW-UP VISIT: CPT | Performed by: PODIATRIST

## 2020-08-13 NOTE — PROGRESS NOTES
Visit Date:   2020      HISTORY OF PRESENT ILLNESS:  Amita comes in today.  She is status post right ankle scope arthroscopy, extensive debridement, os trigonum resection, FHL tenosynovectomy and open lateral ligament repair, both ligaments performed by Dr. Salcedo on 2020.  She states that she is doing okay.  She is using crutches right now, and she had a well-padded splint on, using her crutches and being nonweightbearing.      PHYSICAL EXAMINATION:     MUSCULOSKELETAL:  The splint was removed.  The sutures were intact.  No signs of infection.  Mild swelling.  She denies any numbness and tingling.  She denies any calf pain.  The sutures were removed today and Steri-Stripped      ASSESSMENT:  Status post ankle scope, os trigonum resection, FHL tenosynovectomy, open lateral ligament repair, both ligaments on the right side.  Doing well.      PLAN:  Remove the sutures.  Those were Steri-Stripped.  Place her into a cast boot.  Maintain a nonweightbearing status at this time, using her crutches.  She will follow-up in 4 weeks.  She can go ahead and take the boot off to work on range of motion, dorsal and plantar flexion.         MELL SALCEDO DPM             D: 2020   T: 2020   MT:       Name:     AMITA BARRIOS   MRN:      [id number]        Account:      [id number]   :      1990           Visit Date:   2020      Document: [id number]

## 2020-08-13 NOTE — PROGRESS NOTES
Patient is here for follow up on her right ankle.  Elke Eddy LPN .....................8/13/2020 11:15 AM

## 2020-09-10 ENCOUNTER — OFFICE VISIT (OUTPATIENT)
Dept: ORTHOPEDICS | Facility: OTHER | Age: 30
End: 2020-09-10
Attending: PODIATRIST
Payer: COMMERCIAL

## 2020-09-10 DIAGNOSIS — S93.491D TEAR OF TALOFIBULAR LIGAMENT OF RIGHT LOWER EXTREMITY, SUBSEQUENT ENCOUNTER: Primary | ICD-10-CM

## 2020-09-10 PROCEDURE — G0463 HOSPITAL OUTPT CLINIC VISIT: HCPCS

## 2020-09-10 PROCEDURE — 99024 POSTOP FOLLOW-UP VISIT: CPT | Performed by: PODIATRIST

## 2020-09-10 NOTE — PROGRESS NOTES
Patient is here for follow up on her right ankle.  Elke Eddy LPN .....................9/10/2020 9:30 AM

## 2020-09-10 NOTE — PROGRESS NOTES
Visit Date:   09/10/2020      HISTORY OF PRESENT ILLNESS:  Amita is 5 weeks out from ankle scope with extensive debridement, os trigonum resection, FHL, tenosynovectomy open lateral ligament repair both ligaments.  Doing well, no acute concern aside from some pain and some normal postoperative edema.  She has not therapy yet, has a hard time getting full weightbearing, but has been doing some with single crutch and progressing slowly.      PHYSICAL EXAMINATION:  Surgical site is well-healed.  Minimal swelling.  Does have some tenderness to the posterior medial ankle, as well as posterolateral around the portal sites.  No signs of infection.      ASSESSMENT:  Status post ankle scope, os trigonum resection, FHL, tenosynovectomy, open lateral ligament repair both ligaments.      PLAN:  I discussed condition and treatment with the patient today.  At this point, the patient will progress to therapy.  She will start strengthening, range of motion, gait training and progressive weightbearing, both in and out of her boot.  I will see her back in 4 weeks and continue to progress as able.         MELL GONZALEZ DPM             D: 09/10/2020   T: 09/10/2020   MT: SAMSON      Name:     AMITA BARRIOS   MRN:      [id number]        Account:      [id number]   :      1990           Visit Date:   09/10/2020      Document: [id number]

## 2020-09-11 ENCOUNTER — HOSPITAL ENCOUNTER (OUTPATIENT)
Dept: PHYSICAL THERAPY | Facility: OTHER | Age: 30
Setting detail: THERAPIES SERIES
End: 2020-09-11
Attending: PODIATRIST
Payer: COMMERCIAL

## 2020-09-11 DIAGNOSIS — S93.491D TEAR OF TALOFIBULAR LIGAMENT OF RIGHT LOWER EXTREMITY, SUBSEQUENT ENCOUNTER: ICD-10-CM

## 2020-09-11 PROCEDURE — 97161 PT EVAL LOW COMPLEX 20 MIN: CPT | Mod: GP | Performed by: PHYSICAL THERAPIST

## 2020-09-11 PROCEDURE — 97110 THERAPEUTIC EXERCISES: CPT | Mod: GP | Performed by: PHYSICAL THERAPIST

## 2020-09-11 PROCEDURE — 97116 GAIT TRAINING THERAPY: CPT | Mod: GP | Performed by: PHYSICAL THERAPIST

## 2020-09-11 NOTE — PROGRESS NOTES
"   09/11/20 0800   General Information   Type of Visit Initial OP Ortho PT Evaluation   Start of Care Date 09/11/20   Referring Physician Dr. Salcedo   Orders Evaluate and Treat   Orders Comment ROM - No passive manipulation, strengthening, gait training;     In surgeon's note it stated: \"She will start strengthening, range of motion, gait training, and progressive weightbearing, both in and out of boot.\"   Date of Order 09/10/20   Certification Required? Yes   Medical Diagnosis tear of talofubular ligament of right lower extremity   Surgical/Medical history reviewed Yes   Precautions/Limitations other (see comments)  (Instruction for no passive manipulation.)   General Information Comments DOS: 8/6/20 for ankle scope, os trigonum resection, FHL, tenosynovectomy, open lateral ligament repair both ligaments.    Body Part(s)   Body Part(s) Ankle/Foot   Presentation and Etiology   Pertinent history of current problem (include personal factors and/or comorbidities that impact the POC) Sprained ankle over 1 year ago.  Did physical therapy which helped, but pain never completely resolved.  Pain progressively increased over time and would be worst after a full day of work on her feet.  Currently, after surgery, ankle feels more stable.  Walking with single crutch under RUE and walking boot on R lower leg/foot.     Impairments A. Pain;B. Decreased WB tolerance;C. Swelling;D. Decreased ROM;E. Decreased flexibility;F. Decreased strength and endurance;G. Impaired balance;H. Impaired gait;L. Tingling   Pain rating (0-10 point scale) Best (/10);Worst (/10)   Best (/10) 0/10   Worst (/10) 8/10   Pain quality C. Aching;G. Cramping   Frequency of pain/symptoms B. Intermittent   Pain/symptoms exacerbated by B. Walking;J. ADL   Pain/symptoms eased by H. Cold  (elevation)   Progression of symptoms since onset: Improved   Current Level of Function   Patient role/employment history A. Employed   Employment Comments full-time, [employer], " almost all standing and walking   Living environment [facility]   Home/community accessibility 4 steps to enter   Current equipment-Gait/Locomotion Axillary crutches   Fall Risk Screen   Fall screen completed by PT   Have you fallen 2 or more times in the past year? Yes   Have you fallen and had an injury in the past year? Yes   Is patient a fall risk? Yes   Fall screen comments ankle twisted/collapsed    Ankle/Foot Objective Findings   Side (if bilateral, select both right and left) Right;Left   Observation Mild edema along posterior ankle.  Minimal in forefoot area.    Figure 8:  R = 49.75 cm   L = 48 cm         Gastroc circumference (27 prox from lat malleoli):  R = 33 cm   L = 35 cm   Integumentary 4 cm scar over right lateral malleoli, healing.  2 scope sites on med and lat sides of right achilles tendon, healed.  Right foot is slightly purple/red compared to L foot in sitting/dependent position at rest.   Gait/Locomotion Walking with single crutch under R arm.  Crutch is too short and patient is forward bent.     Ankle/Foot ROM Comment Non-weightbearing DF:  R = -15  L = 6      Non-weightbearing PF:  R = 42   L = 51   Ankle/Foot Strength Comments Discomfort with active plantarflexion of right foot.  Did not attempt to apply resistance into PF, DF, IV, or EV today due to irritability.   Ankle/Foot Flexibility Comments Limited right ankle flexibility.   Palpation Tenderness with light touch to lateral and posterior right ankle.   Planned Therapy Interventions   Planned Therapy Interventions balance training;gait training;manual therapy;neuromuscular re-education;ROM;strengthening;stretching   Planned Modality Interventions   Planned Modality Interventions Cryotherapy;Hot packs   Clinical Impression   Criteria for Skilled Therapeutic Interventions Met yes, treatment indicated   PT Diagnosis impaired mobility   Influenced by the following impairments impaired ROM, flexibility, strength, balance, gait; Pain,  edema;    Functional limitations due to impairments transfers, standing, walking, self-cares, home care tasks, and work   Clinical Presentation Stable/Uncomplicated   Clinical Presentation Rationale clinical observation   Clinical Decision Making (Complexity) Low complexity   Therapy Frequency 2 times/Week   Predicted Duration of Therapy Intervention (days/wks) 8 weeks   Risk & Benefits of therapy have been explained Yes   Patient, Family & other staff in agreement with plan of care Yes   ORTHO GOALS   PT Ortho Eval Goals 1;2;3;4   Ortho Goal 1   Goal Identifier ROM   Goal Description Patient will demonstrate increased Right ankle dorsiflexion from -15 degrees to 0 or better for improved transfers and walking.   Target Date 11/06/20   Ortho Goal 2   Goal Identifier ROM   Goal Description Patient will demonstrate increased Right ankle plantarflexion from 42 to 48 degrees or better for improved push-off with walking.   Target Date 10/09/20   Ortho Goal 3   Goal Identifier walking   Goal Description Patient will be able to walk safely and with symmetrical gait pattern without assistive device 500 ft or more for ability to complete grocery shopping and work tasks throughout 8 hr shift.   Target Date 11/06/20   Ortho Goal 4   Goal Identifier standing   Goal Description Patient will be able to stand 2 hr with 0-2/10 pain max for ability to return to home care and work tasks.   Target Date 11/06/20   Total Evaluation Time   PT Zulma Low Complexity Minutes (94060) 20   Therapy Certification   Certification date from 09/11/20   Certification date to 11/06/20   Medical Diagnosis tear of talofubular ligament of right lower extremity

## 2020-09-11 NOTE — PROGRESS NOTES
[facility]          OUTPATIENT PHYSICAL THERAPY ORTHOPEDIC EVALUATION  PLAN OF TREATMENT FOR OUTPATIENT REHABILITATION  (COMPLETE FOR INITIAL CLAIMS ONLY)  Patient's Last Name, First Name, M.I.  YOB: 1990  Yohana Robertson    Provider s Name:  [facility]   Medical Record No.  [id number]   Start of Care Date:  09/11/20   Onset Date:      Type:     _X__PT   ___OT   ___SLP Medical Diagnosis:  tear of talofubular ligament of right lower extremity     PT Diagnosis:  impaired mobility   Visits from SOC:  1      _________________________________________________________________________________  Plan of Treatment/Functional Goals:  balance training, gait training, manual therapy, neuromuscular re-education, ROM, strengthening, stretching     Cryotherapy, Hot packs     Goals  Goal Identifier: ROM  Goal Description: Patient will demonstrate increased Right ankle dorsiflexion from -15 degrees to 0 or better for improved transfers and walking.  Target Date: 11/06/20    Goal Identifier: ROM  Goal Description: Patient will demonstrate increased Right ankle plantarflexion from 42 to 48 degrees or better for improved push-off with walking.  Target Date: 10/09/20    Goal Identifier: walking  Goal Description: Patient will be able to walk safely and with symmetrical gait pattern without assistive device 500 ft or more for ability to complete grocery shopping and work tasks throughout 8 hr shift.  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  Target Date: 11/06/20                 Therapy Frequency:  2 times/Week  Predicted Duration of Therapy Intervention:  8 weeks    Tayla Cyr, PT                 I CERTIFY THE NEED FOR THESE SERVICES FURNISHED UNDER        THIS PLAN OF TREATMENT AND WHILE UNDER MY CARE .             Physician  Signature               Date    X_____________________________________________________                             Certification Date From:  09/11/20   Certification Date To:  11/06/20    Referring Provider:  Dr. Salcedo    Initial Assessment        See Epic Evaluation Start of Care Date: 09/11/20

## 2020-09-16 ENCOUNTER — HOSPITAL ENCOUNTER (OUTPATIENT)
Dept: PHYSICAL THERAPY | Facility: OTHER | Age: 30
Setting detail: THERAPIES SERIES
End: 2020-09-16
Attending: FAMILY MEDICINE
Payer: COMMERCIAL

## 2020-09-16 PROCEDURE — 97140 MANUAL THERAPY 1/> REGIONS: CPT | Mod: GP | Performed by: PHYSICAL THERAPIST

## 2020-09-16 PROCEDURE — 97010 HOT OR COLD PACKS THERAPY: CPT | Mod: GP | Performed by: PHYSICAL THERAPIST

## 2020-09-21 ENCOUNTER — HOSPITAL ENCOUNTER (OUTPATIENT)
Dept: PHYSICAL THERAPY | Facility: OTHER | Age: 30
Setting detail: THERAPIES SERIES
End: 2020-09-21
Attending: FAMILY MEDICINE
Payer: COMMERCIAL

## 2020-09-21 PROCEDURE — 97010 HOT OR COLD PACKS THERAPY: CPT | Mod: GP | Performed by: PHYSICAL THERAPIST

## 2020-09-21 PROCEDURE — 97110 THERAPEUTIC EXERCISES: CPT | Mod: GP | Performed by: PHYSICAL THERAPIST

## 2020-09-23 ENCOUNTER — HOSPITAL ENCOUNTER (OUTPATIENT)
Dept: PHYSICAL THERAPY | Facility: OTHER | Age: 30
Setting detail: THERAPIES SERIES
End: 2020-09-23
Attending: FAMILY MEDICINE
Payer: COMMERCIAL

## 2020-09-23 PROCEDURE — 97140 MANUAL THERAPY 1/> REGIONS: CPT | Mod: GP | Performed by: PHYSICAL THERAPIST

## 2020-09-23 PROCEDURE — 97110 THERAPEUTIC EXERCISES: CPT | Mod: GP | Performed by: PHYSICAL THERAPIST

## 2020-10-02 ENCOUNTER — HOSPITAL ENCOUNTER (OUTPATIENT)
Dept: PHYSICAL THERAPY | Facility: OTHER | Age: 30
Setting detail: THERAPIES SERIES
End: 2020-10-02
Attending: FAMILY MEDICINE
Payer: COMMERCIAL

## 2020-10-02 PROCEDURE — 97110 THERAPEUTIC EXERCISES: CPT | Mod: GP | Performed by: PHYSICAL THERAPIST

## 2020-10-08 ENCOUNTER — OFFICE VISIT (OUTPATIENT)
Dept: ORTHOPEDICS | Facility: OTHER | Age: 30
End: 2020-10-08
Attending: PODIATRIST
Payer: COMMERCIAL

## 2020-10-08 DIAGNOSIS — Z09 POSTOP CHECK: Primary | ICD-10-CM

## 2020-10-08 PROCEDURE — G0463 HOSPITAL OUTPT CLINIC VISIT: HCPCS

## 2020-10-08 PROCEDURE — 99024 POSTOP FOLLOW-UP VISIT: CPT | Performed by: PODIATRIST

## 2020-10-08 NOTE — PROGRESS NOTES
Patient is here for follow up on her right ankle.  Elke Eddy LPN .....................10/8/2020 1:04 PM

## 2020-10-08 NOTE — PROGRESS NOTES
Visit Date:   10/08/2020      HISTORY OF PRESENT ILLNESS:  Amita is here 9 weeks out from ankle scope with extensive debridement, os trigonum resection, FHL tenosynovectomy, open lateral ligament repair.  Doing well.  Still has some snapping and cracking due to plantar flexion and inversion, likely due to the scar tissue and posterior debridement.  This should continue to improve.  She has minimal swelling.  Good stability of the ankle, just slow to progress in terms of symptoms.      PHYSICAL EXAMINATION:  Good stability of the ankle.  CMS is intact.  Minimal concern clinically.      ASSESSMENT:  Status post ankle scope, os trigonum resection, FHL tenosynovectomy, open lateral ligament repair.      PLAN:  I discussed progression of treatment.  Continues to have pain associated with the peroneal tendons likely due to posterior debridement and scar tissue.  I think continued motion and activity will be good for this.  She will continue to progress as able.  I did release her to work 4-hour shifts next week, 6-hour shifts the following and 8 after that.  With concern, reappoint.  We will plan on 1 more followup in a month to make sure she continues to improve.         MELL GONZALEZ DPM             D: 10/08/2020   T: 10/08/2020   MT: MUSA      Name:     AMITA BARRIOS   MRN:      [id number]        Account:      [id number]   :      1990           Visit Date:   10/08/2020      Document: [id number]

## 2020-10-22 ENCOUNTER — OFFICE VISIT (OUTPATIENT)
Dept: FAMILY MEDICINE | Facility: OTHER | Age: 30
End: 2020-10-22
Attending: FAMILY MEDICINE
Payer: COMMERCIAL

## 2020-10-22 VITALS
HEART RATE: 87 BPM | RESPIRATION RATE: 16 BRPM | OXYGEN SATURATION: 99 % | BODY MASS INDEX: 26.26 KG/M2 | WEIGHT: 164 LBS | DIASTOLIC BLOOD PRESSURE: 86 MMHG | SYSTOLIC BLOOD PRESSURE: 128 MMHG | TEMPERATURE: 97.4 F

## 2020-10-22 DIAGNOSIS — M54.41 ACUTE RIGHT-SIDED LOW BACK PAIN WITH RIGHT-SIDED SCIATICA: ICD-10-CM

## 2020-10-22 DIAGNOSIS — M53.3 SACROILIAC DYSFUNCTION: Primary | ICD-10-CM

## 2020-10-22 PROCEDURE — G0463 HOSPITAL OUTPT CLINIC VISIT: HCPCS

## 2020-10-22 PROCEDURE — 99213 OFFICE O/P EST LOW 20 MIN: CPT | Performed by: FAMILY MEDICINE

## 2020-10-22 RX ORDER — IBUPROFEN 600 MG/1
600 TABLET, FILM COATED ORAL EVERY 8 HOURS PRN
Qty: 90 TABLET | Refills: 3 | Status: SHIPPED | OUTPATIENT
Start: 2020-10-22 | End: 2023-02-07

## 2020-10-22 RX ORDER — DEXAMETHASONE 4 MG/1
TABLET ORAL
Qty: 12 TABLET | Refills: 0 | Status: SHIPPED | OUTPATIENT
Start: 2020-10-22 | End: 2021-01-04

## 2020-10-22 RX ORDER — TIZANIDINE 2 MG/1
TABLET ORAL
Qty: 30 TABLET | Refills: 1 | Status: SHIPPED | OUTPATIENT
Start: 2020-10-22 | End: 2021-01-20

## 2020-10-22 ASSESSMENT — ANXIETY QUESTIONNAIRES
1. FEELING NERVOUS, ANXIOUS, OR ON EDGE: NEARLY EVERY DAY
IF YOU CHECKED OFF ANY PROBLEMS ON THIS QUESTIONNAIRE, HOW DIFFICULT HAVE THESE PROBLEMS MADE IT FOR YOU TO DO YOUR WORK, TAKE CARE OF THINGS AT HOME, OR GET ALONG WITH OTHER PEOPLE: SOMEWHAT DIFFICULT
6. BECOMING EASILY ANNOYED OR IRRITABLE: NEARLY EVERY DAY
5. BEING SO RESTLESS THAT IT IS HARD TO SIT STILL: NEARLY EVERY DAY
3. WORRYING TOO MUCH ABOUT DIFFERENT THINGS: NEARLY EVERY DAY
7. FEELING AFRAID AS IF SOMETHING AWFUL MIGHT HAPPEN: MORE THAN HALF THE DAYS
2. NOT BEING ABLE TO STOP OR CONTROL WORRYING: NEARLY EVERY DAY
GAD7 TOTAL SCORE: 20

## 2020-10-22 ASSESSMENT — PAIN SCALES - GENERAL: PAINLEVEL: WORST PAIN (10)

## 2020-10-22 ASSESSMENT — PATIENT HEALTH QUESTIONNAIRE - PHQ9
5. POOR APPETITE OR OVEREATING: NEARLY EVERY DAY
SUM OF ALL RESPONSES TO PHQ QUESTIONS 1-9: 19

## 2020-10-22 NOTE — PROGRESS NOTES
"Nursing Notes:   Jessica Guerrero LPN  10/22/2020  4:03 PM  Signed  Chief Complaint   Patient presents with     RECHECK     right sided back pain is getting worse       Initial /86   Pulse 87   Temp 97.4  F (36.3  C) (Temporal)   Resp 16   Wt 74.4 kg (164 lb)   LMP 07/01/2018 (LMP Unknown)   SpO2 99%   Breastfeeding No   BMI 26.26 kg/m   Estimated body mass index is 26.26 kg/m  as calculated from the following:    Height as of 8/6/20: 1.683 m (5' 6.26\").    Weight as of this encounter: 74.4 kg (164 lb).  Medication Reconciliation: complete    Jessica Guerrero LPN      SUBJECTIVE:  Yohana Robertson  is a 29 year old female who comes in today for evaluation of right sided low back pain.  She had trouble couple years ago and was referred to physical therapy in  2017.  Back x-rays at that time were unremarkable.  She strained her back quite a while ago (?February). Walking in her cam boot made it worse.     She is about 11 weeks out from ankle scope with extensive debridement tenosynovectomy and open lateral ligament repair done by Dr. Salcedo.  She saw him 2 weeks ago.  She is supposed to see him again next month.    Past Medical, Family, and Social History reviewed and updated as noted below.   ROS is negative except as noted above       Allergies   Allergen Reactions     Codeine Shortness Of Breath and Hives     itching     Peanuts [Nuts] Shortness Of Breath     Hives   ,   Family History   Problem Relation Age of Onset     Heart Disease Mother         Heart Disease,Aortic stenosis and valve replaced     Family History Negative Father         Good Health     Heart Disease Maternal Grandfather         Heart Disease     Asthma Maternal Grandmother         Asthma   ,   Current Outpatient Medications   Medication     dexamethasone (DECADRON) 4 MG tablet     ibuprofen (ADVIL/MOTRIN) 600 MG tablet     tiZANidine (ZANAFLEX) 2 MG tablet     No current facility-administered medications for this visit.    , "   Past Medical History:   Diagnosis Date     Bunion of great toe of left foot     No Comments Provided     Contraceptive management     started on ALYSSIA     Endometriosis 07/2018    diagnosed laparoscopically   ,   Patient Active Problem List    Diagnosis Date Noted     Tear of talofibular ligament of right lower extremity, subsequent encounter 06/26/2020     Priority: Medium     Added automatically from request for surgery [id number]       S/P laparoscopic hysterectomy 07/26/2018     Priority: Medium     Former smoker 07/03/2018     Priority: Medium     Asthma 02/11/2018     Priority: Medium     Attention deficit disorder 02/11/2018     Priority: Medium     Acute bilateral low back pain with sciatica 07/27/2017     Priority: Medium     Anxiety 01/09/2014     Priority: Medium     Severe episode of recurrent major depressive disorder (H) 12/26/2013     Priority: Medium     Nonallopathic lesion of lumbar region 08/08/2013     Priority: Medium     IBS (irritable bowel syndrome) 03/28/2013     Priority: Medium     Bunion, left foot 04/11/2012     Priority: Medium     Common migraine 04/20/2011     Priority: Medium   ,   Past Surgical History:   Procedure Laterality Date     ARTHROSCOPY ANKLE, OPEN REPAIR LIGAMENT, COMBINED Right 8/6/2020    Procedure: Posterior ARTHROSCOPY, ANKLE, WITH OPEN REPAIR OF ANKLE LIGAMENT, resection of os trigonum, FHL tenosyonovectomy;  Surgeon: Sean Salcedo DPM;  Location:  OR     BUNIONECTOMY      4/2005,Left     COLONOSCOPY      7/2013,Normal     CYSTOSCOPY N/A 7/26/2018    Procedure: CYSTOSCOPY;;  Surgeon: Renate Ch MD;  Location:  OR     DENTAL SURGERY      wisdom teeth     FRACTURE SURGERY      6/13,L Foot-tarsometatarsal realignment arthrodesis with plate and screw fixation, capsule tendon balancing procedures about the first tarsometatarsal joint, Landry Gibson DPM  [facility]     hardware removal Left     7/26/2006,435643,REMOVAL OF FOREIGN BODY,Removal of  harware L foot after bunionectomy [Other][[[[     LAPAROSCOPIC HYSTERECTOMY TOTAL, SALPINGECTOMY N/A 7/26/2018    Procedure: LAPAROSCOPIC HYSTERECTOMY TOTAL, SALPINGECTOMY;  Total Laparoscopic Hysterectomy & Bilateral Salpingectomy, Cystoscopy.;  Surgeon: Renate Ch MD;  Location: [facility] OR     TONSILLECTOMY      12/2005    and   Social History     Tobacco Use     Smoking status: Current Every Day Smoker     Years: 9.00     Types: Cigarettes, Vaping Device     Smokeless tobacco: Never Used     Tobacco comment: using e-cig   Substance Use Topics     Alcohol use: Yes     Alcohol/week: 21.0 standard drinks     Comment: couple of brandys a night     OBJECTIVE:  /86   Pulse 87   Temp 97.4  F (36.3  C) (Temporal)   Resp 16   Wt 74.4 kg (164 lb)   LMP 07/01/2018 (LMP Unknown)   SpO2 99%   Breastfeeding No   BMI 26.26 kg/m     EXAM:  Alert cooperative, no distress.  Examination of the low back reveals significant paraspinal muscle spasm in the right lower lumbar region.  Diminished lumbar range of motion including lateral bending and flexion and extension.  There is right SI joint tenderness.  There is no sciatic notch tenderness.  The patient is able to stand on each foot alternately and raise on the toes.  Is able to stand on heels.  Standing flat-footed on each foot alternately and extending the back does cause increased right SI joint pain. SLR is negative bilaterally.  No loss of strength or reflexes in the lower extremities.   ASSESSMENT/Plan :    Yohana was seen today for recheck.    Diagnoses and all orders for this visit:    Sacroiliac dysfunction  -     PHYSICAL THERAPY REFERRAL; Future    Acute right-sided low back pain with right-sided sciatica  -     tiZANidine (ZANAFLEX) 2 MG tablet; 1-2 tabs PO TID PRN muscle spasm  -     ibuprofen (ADVIL/MOTRIN) 600 MG tablet; Take 1 tablet (600 mg) by mouth every 8 hours as needed for moderate pain  -     dexamethasone (DECADRON) 4 MG tablet; One tablet  twice daily for 4 days, then once daily for 4 days, then stop.  Take all medication with food.  -     PHYSICAL THERAPY REFERRAL; Future      Discussed ice, scheduled dose ibuprofen.  Tizanidine for muscle relaxation.  We will go ahead and burst with dexamethasone and referred to physical therapy for evaluation and treatment.  Advise follow-up if worsening or not improving.    Oscar Robertson MD

## 2020-10-22 NOTE — NURSING NOTE
"Chief Complaint   Patient presents with     RECHECK     right sided back pain is getting worse       Initial /86   Pulse 87   Temp 97.4  F (36.3  C) (Temporal)   Resp 16   Wt 74.4 kg (164 lb)   LMP 07/01/2018 (LMP Unknown)   SpO2 99%   Breastfeeding No   BMI 26.26 kg/m   Estimated body mass index is 26.26 kg/m  as calculated from the following:    Height as of 8/6/20: 1.683 m (5' 6.26\").    Weight as of this encounter: 74.4 kg (164 lb).  Medication Reconciliation: complete    Jessica Guerrero LPN  "

## 2020-10-23 ENCOUNTER — HOSPITAL ENCOUNTER (OUTPATIENT)
Dept: PHYSICAL THERAPY | Facility: OTHER | Age: 30
Setting detail: THERAPIES SERIES
End: 2020-10-23
Attending: FAMILY MEDICINE
Payer: COMMERCIAL

## 2020-10-23 PROCEDURE — 97110 THERAPEUTIC EXERCISES: CPT | Mod: GP | Performed by: PHYSICAL THERAPIST

## 2020-10-23 ASSESSMENT — ANXIETY QUESTIONNAIRES: GAD7 TOTAL SCORE: 20

## 2020-10-23 ASSESSMENT — ASTHMA QUESTIONNAIRES: ACT_TOTALSCORE: 25

## 2020-10-23 NOTE — PROGRESS NOTES
Outpatient Physical Therapy Discharge Note     Patient: Yohana Robertson  : 1990    Beginning/End Dates of Reporting Period:  20 to 10/23/2020    Referring Provider: Dr. Salcedo    Therapy Diagnosis: tear of talofibular ligament of right lower extremity         Client Self Report: Patient has not been seen since 10/2/20 (3 wk).   Was seen by primary doctor yesterday for low back and Right hip.  Wanted to add orders for those body parts.  Arrived late for visit and only had 30 min.      Objective Measurements:  Objective Measure: pain rating (R ankle)   = 5/10        9/16=2/10       =4/10        9/23=8/10      10/2=4/10        10/23 = 1-2/10     Objective Measure: NWB DF R:   = -15       = 1      10/23 = 0, (22 weightbearing DF)    Objective Measure: NWB PF R:   = 42         = 43      10/23 = 55       Goals:  Goal Identifier ROM   Goal Description Patient will demonstrate increased Right ankle dorsiflexion from -15 degrees to 0 or better for improved transfers and walking.   Target Date 20   Date Met  10/23/20   Progress:  Goal met     Goal Identifier ROM   Goal Description Patient will demonstrate increased Right ankle plantarflexion from 42 to 48 degrees or better for improved push-off with walking.   Target Date 10/09/20   Date Met  10/23/20   Progress:  Goal met     Goal Identifier walking   Goal Description Patient will be able to walk safely and with symmetrical gait pattern without assistive device 500 ft or more for ability to complete grocery shopping and work tasks throughout 8 hr shift.   Target Date 20   Date Met      Progress: Not able to assess due to right hip and low back pain.     Goal Identifier standing   Goal Description Patient will be able to stand 2 hr with 0-2/10 pain max for ability to return to home care and work tasks.   Target Date 20   Date Met      Progress:  Not able to assess due to right hip and low back pain.       Progress  Toward Goals:   Improved ROM active and passively.  Improved strength in all directions.  Patient reports minimal discomfort and has a HEP in place.      Plan:  Discharge from therapy.    Discharge:    Reason for Discharge:  Patient reports recent onset of low back and right hip pain (chronic episodes).  Limiting progression of ankle rehab at this time due to limited weightbearing on RLE. Will evaluate and treat low back and R hip per new orders from primary MD.  Once resolved and able to functionally bear weight through RLE, may resume treatment of R ankle if needed.    Equipment Issued: none    Discharge Plan: Patient to continue home program.

## 2020-10-28 ENCOUNTER — HOSPITAL ENCOUNTER (OUTPATIENT)
Dept: PHYSICAL THERAPY | Facility: OTHER | Age: 30
Setting detail: THERAPIES SERIES
End: 2020-10-28
Attending: FAMILY MEDICINE
Payer: COMMERCIAL

## 2020-10-28 DIAGNOSIS — M54.41 ACUTE RIGHT-SIDED LOW BACK PAIN WITH RIGHT-SIDED SCIATICA: ICD-10-CM

## 2020-10-28 DIAGNOSIS — M53.3 SACROILIAC DYSFUNCTION: ICD-10-CM

## 2020-10-28 PROCEDURE — 97140 MANUAL THERAPY 1/> REGIONS: CPT | Mod: GP,XU | Performed by: PHYSICAL THERAPIST

## 2020-10-28 PROCEDURE — 97162 PT EVAL MOD COMPLEX 30 MIN: CPT | Mod: GP | Performed by: PHYSICAL THERAPIST

## 2020-10-28 PROCEDURE — 97110 THERAPEUTIC EXERCISES: CPT | Mod: GP | Performed by: PHYSICAL THERAPIST

## 2020-10-28 NOTE — PROGRESS NOTES
[facility]          OUTPATIENT PHYSICAL THERAPY ORTHOPEDIC EVALUATION  PLAN OF TREATMENT FOR OUTPATIENT REHABILITATION  (COMPLETE FOR INITIAL CLAIMS ONLY)  Patient's Last Name, First Name, M.I.  YOB: 1990  Yohana Robertson    Provider s Name:  [facility]   Medical Record No.  [id number]   Start of Care Date:  10/28/20   Onset Date:      Type:     _X__PT   ___OT   ___SLP Medical Diagnosis:  1) acute right sided low back pain with sciatica  2) sacroiliac dystunction     PT Diagnosis:  impaired mobility   Visits from SOC:  1      _________________________________________________________________________________  Plan of Treatment/Functional Goals:  joint mobilization, manual therapy, ROM, strengthening, stretching     Cryotherapy, Hot packs     Goals  Goal Identifier: pelvic alignment  Goal Description: Patient will consistently present with neutral pelvic alignment for decreased pain with standing and walking.  Target Date: 11/25/20    Goal Identifier: muscle spasm  Goal Description: Patient will have no muslce spasms and be able to eliminate use of muscle relaxers with no increased pain rating for improved tolerance to activity.  Target Date: 11/25/20    Goal Identifier: strength  Goal Description: Patient will demonstrate ability to complete all strengthening exercises with back pain 0-3/10 max for progression of lumbar stability.  Target Date: 11/25/20    Goal Identifier: walking  Goal Description: Patient will demonstrate symmetrical gait pattern, community level distances for ability to complete home and work tasks.  Target Date: 12/23/20    Goal Identifier: strength  Goal Description: Patient will be able to complete asymmetrical bending and rotational activity with no pain for ability to complete  and home care tasks.  Target Date: 12/23/20             Therapy Frequency:  2 times/Week  Predicted Duration of Therapy Intervention:  8  bonny Cyr, PT                 I CERTIFY THE NEED FOR THESE SERVICES FURNISHED UNDER        THIS PLAN OF TREATMENT AND WHILE UNDER MY CARE     (Physician co-signature of this document indicates review and certification of the therapy plan).                       Certification Date From:  10/28/20   Certification Date To:  12/23/20    Referring Provider:  Dr. Robertson    Initial Assessment        See Epic Evaluation Start of Care Date: 10/28/20

## 2020-10-28 NOTE — PROGRESS NOTES
10/28/20 1300   General Information   Type of Visit Initial OP Ortho PT Evaluation   Start of Care Date 10/28/20   Referring Physician Dr. Robertson   Orders Evaluate and Treat   Date of Order 10/22/20   Certification Required? Yes   Medical Diagnosis 1) acute right sided low back pain with sciatica  2) sacroiliac dystunction   Surgical/Medical history reviewed Yes   Precautions/Limitations no known precautions/limitations   Body Part(s)   Body Part(s) Lumbar Spine/SI   Presentation and Etiology   Pertinent history of current problem (include personal factors and/or comorbidities that impact the POC) Patient reports off and on pain since February with bending forward to pick something up from the floor at work and felt something pop in her back.  Recent ankle surgery and use of walking boot seemed to aggravate back again.  Most recently got worse causing gait disruption within the last 1-2 weeks.     Impairments A. Pain;B. Decreased WB tolerance;C. Swelling;D. Decreased ROM;F. Decreased strength and endurance;G. Impaired balance;H. Impaired gait;N. Headaches   Symptom Location Low back and occasionally wraps around to the front of the thigh.  Increased pain with prolonged positioning and weightbearing.     Chronicity New   Best (/10) 6-8/10   Worst (/10) 10/10   Pain quality A. Sharp;C. Aching;F. Stabbing;G. Cramping   Frequency of pain/symptoms A. Constant   Pain/symptoms exacerbated by A. Sitting;B. Walking;C. Lifting;D. Carrying;G. Certain positions;I. Bending;J. ADL;K. Home tasks;L. Work tasks   Pain/symptoms eased by E. Changing positions;H. Cold;I. OTC medication(s)  (ibuprofen, muscle relaxer)   Progression of symptoms since onset: Improved   Current Level of Function   Patient role/employment history A. Employed   Employment Comments  at [employer], was full-time;   [facility]  [place] [facility]   Home/community accessibility 4 steps to enter; all needs on 1 level   Fall Risk  Screen   Fall screen completed by PT   Have you fallen 2 or more times in the past year? Yes   Have you fallen and had an injury in the past year? No   Is patient a fall risk? No   Lumbar Spine/SI Objective Findings   Gait/Locomotion antalgic Right;  Wearing older pair of winter boots that she has been using at primary outdoor footwear since mid-October with first snowfall.   Balance/Proprioception (Single Leg Stance) not able to do because of pain   Pelvic Screen Standing:  ant view: L lateral shift; post view: R IC slightly elevated;     Supine: R malleoli elevated;  R ASIS lower;   (-) supine to sit test;  Hip ER R = 55  L = 65    Hip IR R = 48  L = 42   Total R = 103  L = 107     Hamstring flexibility: Patient reports more tension on R       Prone: R PSIS lower;   increased R quad flexibility right compared to L;    Palpation Standing:  Pain reported with palpation of right SI joint area.  Tenderness along bilateral lumbar paraspinals and Right upper gluteals.     Prone: Discomfort around R SI joint.  Minimal discomfort along paraspinals or gluteals.   Planned Therapy Interventions   Planned Therapy Interventions joint mobilization;manual therapy;ROM;strengthening;stretching   Planned Modality Interventions   Planned Modality Interventions Cryotherapy;Hot packs   Clinical Impression   Criteria for Skilled Therapeutic Interventions Met yes, treatment indicated   PT Diagnosis impaired mobility   Influenced by the following impairments impaired alignment; muscle spasm; impaired strength   Functional limitations due to impairments transfers, sleeping, walking, standing, self-cares/childcare, household and work tasks   Clinical Presentation Evolving/Changing   Clinical Presentation Rationale clinical observation   Clinical Decision Making (Complexity) Moderate complexity   Therapy Frequency 2 times/Week   Predicted Duration of Therapy Intervention (days/wks) 8 weeks   Risk & Benefits of therapy have been explained  Yes   Patient, Family & other staff in agreement with plan of care Yes   Clinical Impression Comments Patient demonstrates signs and symptoms consistent with right anterior innominate rotation and pelvic flare.  Experiencing muscle spasm and significant gait disruption.  Would benefit from physical therapy for muscle relaxation, pelvic alignment techniques, and progression of lumbar stabilization exercises.   ORTHO GOALS   PT Ortho Eval Goals 5   Ortho Goal 1   Goal Identifier pelvic alignment   Goal Description Patient will consistently present with neutral pelvic alignment for decreased pain with standing and walking.   Target Date 11/25/20   Ortho Goal 2   Goal Identifier muscle spasm   Goal Description Patient will have no muslce spasms and be able to eliminate use of muscle relaxers with no increased pain rating for improved tolerance to activity.   Target Date 11/25/20   Ortho Goal 3   Goal Identifier strength   Goal Description Patient will demonstrate ability to complete all strengthening exercises with back pain 0-3/10 max for progression of lumbar stability.   Target Date 11/25/20   Ortho Goal 4   Goal Identifier walking   Goal Description Patient will demonstrate symmetrical gait pattern, community level distances for ability to complete home and work tasks.   Target Date 12/23/20   Ortho Goal 5   Goal Identifier strength   Goal Description Patient will be able to complete asymmetrical bending and rotational activity with no pain for ability to complete  and home care tasks.   Target Date 12/23/20   Total Evaluation Time   PT Eval, Moderate Complexity Minutes (94051) 25   Therapy Certification   Certification date from 10/28/20   Certification date to 12/23/20   Medical Diagnosis 1) acute right sided low back pain with sciatica  2) sacroiliac dystunction

## 2020-11-02 ENCOUNTER — HOSPITAL ENCOUNTER (OUTPATIENT)
Dept: PHYSICAL THERAPY | Facility: OTHER | Age: 30
Setting detail: THERAPIES SERIES
End: 2020-11-02
Attending: FAMILY MEDICINE
Payer: COMMERCIAL

## 2020-11-02 PROCEDURE — 97110 THERAPEUTIC EXERCISES: CPT | Mod: GP | Performed by: PHYSICAL THERAPIST

## 2020-11-04 ENCOUNTER — HOSPITAL ENCOUNTER (OUTPATIENT)
Dept: PHYSICAL THERAPY | Facility: OTHER | Age: 30
Setting detail: THERAPIES SERIES
End: 2020-11-04
Attending: FAMILY MEDICINE
Payer: COMMERCIAL

## 2020-11-04 PROCEDURE — 97110 THERAPEUTIC EXERCISES: CPT | Mod: GP | Performed by: PHYSICAL THERAPIST

## 2020-11-10 ENCOUNTER — ALLIED HEALTH/NURSE VISIT (OUTPATIENT)
Dept: FAMILY MEDICINE | Facility: OTHER | Age: 30
End: 2020-11-10
Attending: FAMILY MEDICINE
Payer: COMMERCIAL

## 2020-11-10 DIAGNOSIS — Z23 NEED FOR PROPHYLACTIC VACCINATION AND INOCULATION AGAINST INFLUENZA: Primary | ICD-10-CM

## 2020-11-10 PROCEDURE — G0008 ADMIN INFLUENZA VIRUS VAC: HCPCS

## 2020-11-10 NOTE — NURSING NOTE
Immunization Documentation    Prior to Immunization administration, verified patients identity using patient's name and date of birth. Please see IMMUNIZATIONS  and order for additional information.  Patient / Parent instructed to remain in clinic for 15 minutes and report any adverse reaction to staff immediately.    Was entire vial of medication used? Yes  Vial/Syringe: Ken Hernandez LPN  11/10/2020   12:26 PM

## 2020-12-02 ENCOUNTER — HOSPITAL ENCOUNTER (OUTPATIENT)
Dept: PHYSICAL THERAPY | Facility: OTHER | Age: 30
Setting detail: THERAPIES SERIES
End: 2020-12-02
Attending: PODIATRIST
Payer: COMMERCIAL

## 2020-12-02 PROCEDURE — 97140 MANUAL THERAPY 1/> REGIONS: CPT | Mod: GP | Performed by: PHYSICAL THERAPIST

## 2020-12-02 PROCEDURE — 97110 THERAPEUTIC EXERCISES: CPT | Mod: GP | Performed by: PHYSICAL THERAPIST

## 2020-12-02 NOTE — PROGRESS NOTES
Outpatient Physical Therapy Progress Note     Patient: Yohana Robertson  : 1990    Beginning/End Dates of Reporting Period:  10/28/2020 to 2020  Current Certification through 2020    Referring Provider: Dr. Robertson    Therapy Diagnosis: 1) acute right sided low back pain with sciatica  2) sacroiliac dystunction         Client Self Report: Patient has not been seen since 20 (1 month).  Patient states she had to cancel visits due to not having .  Reports things were going well, but not hip is back to the way it was.  Fell about 4 days ago.  Describes pain as cramping and stabbing pain.  Points to Right SI joint.  Reports doing exercises 2x/day.  Using ice since recent fall.  Patient feels area is swollen.      Objective Measurements:  Assessment of lumbar spine and pelvis.    Standing: L lateral shift, decreased extension, decreased R sidebending.  Pain with all directions of trunk motion.    Supine:  L malleoli slightly lower, ASIS look level, +sup-sit L;        Prone:  PSIS level, equal quadriceps flexibility     Palpation: Tenderness over right lumbar paraspinals, tenderness with PA mob of left and right lumbar transverse processes (right worse than left), minimal discomfort over SI and gluteals;             Goals:  Goal Identifier pelvic alignment   Goal Description Patient will consistently present with neutral pelvic alignment for decreased pain with standing and walking.   Target Date 20   Date Met  20    Progress:  Goal met     Goal Identifier muscle spasm   Goal Description Patient will have no muslce spasms and be able to eliminate use of muscle relaxers with no increased pain rating for improved tolerance to activity.   Target Date 20   Date Met      Progress:     Goal Identifier strength   Goal Description Patient will demonstrate ability to complete all strengthening exercises with back pain 0-3/10 max for progression of lumbar stability.   Target Date  11/25/20   Date Met      Progress:     Goal Identifier walking   Goal Description Patient will demonstrate symmetrical gait pattern, community level distances for ability to complete home and work tasks.   Target Date 12/23/20   Date Met      Progress:     Goal Identifier strength   Goal Description Patient will be able to complete asymmetrical bending and rotational activity with no pain for ability to complete  and home care tasks.   Target Date 12/23/20   Date Met      Progress:       Progress Toward Goals:   Patient had been doing well over the past month, however recently slipped and fell straining low back.  Patient presents today with lumbar muscle spasm and impaired mobility.        Plan:  Continue therapy per current plan of care.    Discharge:  No

## 2020-12-07 ENCOUNTER — HOSPITAL ENCOUNTER (OUTPATIENT)
Dept: PHYSICAL THERAPY | Facility: OTHER | Age: 30
Setting detail: THERAPIES SERIES
End: 2020-12-07
Attending: PODIATRIST
Payer: COMMERCIAL

## 2020-12-07 PROCEDURE — 97035 APP MDLTY 1+ULTRASOUND EA 15: CPT | Mod: GP | Performed by: PHYSICAL THERAPIST

## 2020-12-07 PROCEDURE — 97140 MANUAL THERAPY 1/> REGIONS: CPT | Mod: GP | Performed by: PHYSICAL THERAPIST

## 2020-12-09 ENCOUNTER — HOSPITAL ENCOUNTER (OUTPATIENT)
Dept: PHYSICAL THERAPY | Facility: OTHER | Age: 30
Setting detail: THERAPIES SERIES
End: 2020-12-09
Attending: PODIATRIST
Payer: COMMERCIAL

## 2020-12-09 PROCEDURE — 97140 MANUAL THERAPY 1/> REGIONS: CPT | Mod: GP | Performed by: PHYSICAL THERAPIST

## 2020-12-09 PROCEDURE — 97035 APP MDLTY 1+ULTRASOUND EA 15: CPT | Mod: GP | Performed by: PHYSICAL THERAPIST

## 2020-12-14 ENCOUNTER — HOSPITAL ENCOUNTER (OUTPATIENT)
Dept: PHYSICAL THERAPY | Facility: OTHER | Age: 30
Setting detail: THERAPIES SERIES
End: 2020-12-14
Attending: PODIATRIST
Payer: COMMERCIAL

## 2020-12-14 PROCEDURE — 97110 THERAPEUTIC EXERCISES: CPT | Mod: GP | Performed by: PHYSICAL THERAPIST

## 2020-12-16 ENCOUNTER — HOSPITAL ENCOUNTER (OUTPATIENT)
Dept: PHYSICAL THERAPY | Facility: OTHER | Age: 30
Setting detail: THERAPIES SERIES
End: 2020-12-16
Attending: PODIATRIST
Payer: COMMERCIAL

## 2020-12-16 PROCEDURE — 97110 THERAPEUTIC EXERCISES: CPT | Mod: GP | Performed by: PHYSICAL THERAPIST

## 2020-12-21 ENCOUNTER — HOSPITAL ENCOUNTER (OUTPATIENT)
Dept: PHYSICAL THERAPY | Facility: OTHER | Age: 30
Setting detail: THERAPIES SERIES
End: 2020-12-21
Attending: PODIATRIST
Payer: COMMERCIAL

## 2020-12-21 PROCEDURE — 97110 THERAPEUTIC EXERCISES: CPT | Mod: GP | Performed by: PHYSICAL THERAPIST

## 2020-12-21 NOTE — PROGRESS NOTES
Outpatient Physical Therapy Progress Note     Patient: Yohana Robertson  : 1990    Beginning/End Dates of Reporting Period:  10/28/2020 to 2020  New Certification:   20 to 21    Referring Provider: Dr. Robertson    Therapy Diagnosis: 1) acute right sided low back pain with sciatica  2) sacroiliac dystunction         Client Self Report: Increased soreness today.  Drove to [place] and back with no breaks, so was in the car for about 4 hours.  Also did [place] shopping so was walking for about 4-5 hours.      Objective Measurements:  Objective Measure: pain rating (low back)  Details: -8/10        Goals:  Goal Identifier pelvic alignment   Goal Description Patient will consistently present with neutral pelvic alignment for decreased pain with standing and walking.   Target Date 20   Date Met  20   Progress:  Patient demonstrates neutral alignment.  Occasional reassessment required.     Goal Identifier muscle spasm   Goal Description Patient will have no muslce spasms and be able to eliminate use of muscle relaxers with no increased pain rating for improved tolerance to activity.   Target Date 20   Date Met      Progress:  Progress made.  Continues to have increased pain at times, usually related to increased activity level.     Goal Identifier strength   Goal Description Patient will demonstrate ability to complete all strengthening exercises with back pain 0-3/10 max for progression of lumbar stability.   Target Date 20   Date Met      Progress:  Not met.     Goal Identifier walking   Goal Description Patient will demonstrate symmetrical gait pattern, community level distances for ability to complete home and work tasks.   Target Date 20   Date Met      Progress:  Progress made, but inconsistent, usually related to activity level.     Goal Identifier strength   Goal Description Patient will be able to complete asymmetrical bending and rotational activity with  no pain for ability to complete  and home care tasks.   Target Date 12/23/20   Date Met      Progress:  Not met       Progress Toward Goals:   Patient had one set-back, but is improving.  Today reports increased pain again from 3-4/10 to 6-8/10 after spending several hours in a car and walking for several hours.  Was able to tolerate all stabilization exercises and try a few new exercises today in spite of increased pain rating.  No complaint of worse pain by end of session.          Plan:  Continue therapy per current plan of care.  Plan to continue progression of spine stabilization exercises.    Discharge:  No

## 2020-12-21 NOTE — PROGRESS NOTES
[facility]      OUTPATIENT PHYSICAL THERAPY  PLAN OF TREATMENT FOR OUTPATIENT REHABILITATION    Patient's Last Name, First Name, M.I.                YOB: 1990  Yohana Robertson                        Provider's Name  [facility] Medical Record No.  [id number]                               Onset Date: 10/10/20   Start of Care Date: 10/28/20   Type:     _X_PT   ___OT   ___SLP Medical Diagnosis: 1) acute right sided low back pain with sciatica    2) sacroiliac dysfunction                       PT Diagnosis: impaired mobility      _________________________________________________________________________________  Plan of Treatment:  Therapeutic Exercise, Manual therapy, Ultrasound, Cold Pack    Frequency/Duration: 2x/week, 4 weeks     Goals:     Goals:  Goal Identifier pelvic alignment   Goal Description Patient will consistently present with neutral pelvic alignment for decreased pain with standing and walking.   Target Date 11/25/20   Date Met  12/21/20   Progress:  Patient demonstrates neutral alignment.  Occasional reassessment required.      Goal Identifier muscle spasm   Goal Description Patient will have no muslce spasms and be able to eliminate use of muscle relaxers with no increased pain rating for improved tolerance to activity.   Target Date 11/25/20   Date Met      Progress:  Progress made.  Continues to have increased pain at times, usually related to increased activity level.      Goal Identifier strength   Goal Description Patient will demonstrate ability to complete all strengthening exercises with back pain 0-3/10 max for progression of lumbar stability.   Target Date 11/25/20   Date Met      Progress:  Not met.      Goal Identifier walking   Goal Description Patient will demonstrate symmetrical gait pattern, community level distances for ability to complete home  and work tasks.   Target Date 12/23/20   Date Met      Progress:  Progress made, but inconsistent, usually related to activity level.      Goal Identifier strength   Goal Description Patient will be able to complete asymmetrical bending and rotational activity with no pain for ability to complete  and home care tasks.   Target Date 12/23/20   Date Met      Progress:  Not met          Certification date from 12/24/20 to 1/24/21.    Tayla Cyr, PT          I CERTIFY THE NEED FOR THESE SERVICES FURNISHED UNDER        THIS PLAN OF TREATMENT AND WHILE UNDER MY CARE     (Physician co-signature of this document indicates review and certification of the therapy plan).                Referring Provider: Dr. Robertson

## 2020-12-30 ENCOUNTER — HOSPITAL ENCOUNTER (OUTPATIENT)
Dept: PHYSICAL THERAPY | Facility: OTHER | Age: 30
Setting detail: THERAPIES SERIES
End: 2020-12-30
Attending: PODIATRIST
Payer: COMMERCIAL

## 2020-12-30 PROCEDURE — 97110 THERAPEUTIC EXERCISES: CPT | Mod: GP | Performed by: PHYSICAL THERAPIST

## 2021-01-04 ENCOUNTER — HOSPITAL ENCOUNTER (OUTPATIENT)
Dept: PHYSICAL THERAPY | Facility: OTHER | Age: 31
Setting detail: THERAPIES SERIES
End: 2021-01-04
Attending: PODIATRIST
Payer: COMMERCIAL

## 2021-01-04 ENCOUNTER — HOSPITAL ENCOUNTER (OUTPATIENT)
Dept: GENERAL RADIOLOGY | Facility: OTHER | Age: 31
End: 2021-01-04
Attending: NURSE PRACTITIONER
Payer: COMMERCIAL

## 2021-01-04 ENCOUNTER — OFFICE VISIT (OUTPATIENT)
Dept: FAMILY MEDICINE | Facility: OTHER | Age: 31
End: 2021-01-04
Attending: NURSE PRACTITIONER
Payer: COMMERCIAL

## 2021-01-04 VITALS
WEIGHT: 172 LBS | SYSTOLIC BLOOD PRESSURE: 124 MMHG | DIASTOLIC BLOOD PRESSURE: 70 MMHG | TEMPERATURE: 98.8 F | RESPIRATION RATE: 16 BRPM | HEART RATE: 88 BPM | BODY MASS INDEX: 27.54 KG/M2

## 2021-01-04 DIAGNOSIS — M79.672 LEFT FOOT PAIN: ICD-10-CM

## 2021-01-04 DIAGNOSIS — G89.29 CHRONIC RIGHT-SIDED LOW BACK PAIN WITHOUT SCIATICA: ICD-10-CM

## 2021-01-04 DIAGNOSIS — S99.922A FOOT INJURY, LEFT, INITIAL ENCOUNTER: ICD-10-CM

## 2021-01-04 DIAGNOSIS — M54.50 CHRONIC RIGHT-SIDED LOW BACK PAIN WITHOUT SCIATICA: Primary | ICD-10-CM

## 2021-01-04 DIAGNOSIS — M54.41 ACUTE RIGHT-SIDED LOW BACK PAIN WITH RIGHT-SIDED SCIATICA: ICD-10-CM

## 2021-01-04 DIAGNOSIS — G89.29 CHRONIC RIGHT-SIDED LOW BACK PAIN WITHOUT SCIATICA: Primary | ICD-10-CM

## 2021-01-04 DIAGNOSIS — M54.50 CHRONIC RIGHT-SIDED LOW BACK PAIN WITHOUT SCIATICA: ICD-10-CM

## 2021-01-04 PROCEDURE — 72100 X-RAY EXAM L-S SPINE 2/3 VWS: CPT

## 2021-01-04 PROCEDURE — G0463 HOSPITAL OUTPT CLINIC VISIT: HCPCS

## 2021-01-04 PROCEDURE — 99214 OFFICE O/P EST MOD 30 MIN: CPT | Performed by: NURSE PRACTITIONER

## 2021-01-04 PROCEDURE — 73630 X-RAY EXAM OF FOOT: CPT | Mod: LT

## 2021-01-04 RX ORDER — LIDOCAINE 4 G/G
PATCH TOPICAL
Qty: 15 PATCH | Refills: 0 | Status: SHIPPED | OUTPATIENT
Start: 2021-01-04 | End: 2021-01-19

## 2021-01-04 ASSESSMENT — PAIN SCALES - GENERAL: PAINLEVEL: WORST PAIN (10)

## 2021-01-04 NOTE — PROGRESS NOTES
Outpatient Physical Therapy Progress Note     Patient: Yohana Robertson  : 1990    Beginning/End Dates of Reporting Period:  10/28/2020 to 2021    Referring Provider: Dr. Robertson    Therapy Diagnosis: 1) acute right sided low back pain with sciatica  2) sacroiliac dystunction       Client Self Report: Pain increased again around Friday last week 21, and has increased since then.  Had to leave work Saturday.  Reports back was swollen and had bruising.  Not sure where bruising could have come from.  Reports pain into tailbone and feels popping and grinding.  Feels she needs to go to [facility].  Stretching usually helps, but has tried pain several times and it seems to be making pain worse and worse.  Feels something may be out of place in her back and that she feels kind of like she did when she was pregnant and has having sciatica symptoms.      Also reports she dropped a drawer on her foot and there is a line of bruising diagonally across the top of her foot.  This foot has had 4 surgeries and she states there is a metal plate in her foot.  Recommended patient have this checked as well to make sure everything is in place.        Objective Measurements:  Antalgic gait observed.    - Reported Right low back pain and left foot pain.      Goals:  Goals not assessed today.  Goal Identifier pelvic alignment   Goal Description Patient will consistently present with neutral pelvic alignment for decreased pain with standing and walking.   Target Date 20   Date Met      Progress:     Goal Identifier muscle spasm   Goal Description Patient will have no muslce spasms and be able to eliminate use of muscle relaxers with no increased pain rating for improved tolerance to activity.   Target Date 20   Date Met      Progress:     Goal Identifier strength   Goal Description Patient will demonstrate ability to complete all strengthening exercises with back pain 0-3/10 max for progression of lumbar  stability.   Target Date 11/25/20   Date Met      Progress:     Goal Identifier walking   Goal Description Patient will demonstrate symmetrical gait pattern, community level distances for ability to complete home and work tasks.   Target Date 12/23/20   Date Met      Progress:     Goal Identifier strength   Goal Description Patient will be able to complete asymmetrical bending and rotational activity with no pain for ability to complete  and home care tasks.   Target Date 12/23/20   Date Met      Progress:       Progress Toward Goals:   Patient declined treatment today.  Plans to leave therapy and go directly to [facility] due to back and foot pain.  Patient plans to request imaging due to ongoing back pain/flares.  Patient will have several good weeks, and progress with stabilization exercises, however then has an episode of increased pain that disrupts home and work tasks that takes several weeks to recover.      Plan:  Hold therapy at this time.  Patient's current certification is through 1/24/21.    Discharge:  No

## 2021-01-04 NOTE — NURSING NOTE
"Patient presents to the clinic for xrays of right side of the back and left foot.  Patient dropped a dresser draw on the left foot a couple of hours ago, hardware is present inside the left foot.  Patient saw Oscar Robertson MD for back pain/swelling/bruising in October and received Dexamethasone, Ibuprofen and Tizanidine with effect.  PT popped hip back into placed and pain restarted.      PT does not want to adjust patient until she gets imaging of the back and foot.    Chief Complaint   Patient presents with     Musculoskeletal Problem       Initial /70 (BP Location: Left arm, Patient Position: Sitting, Cuff Size: Adult Regular)   Pulse 88   Temp 98.8  F (37.1  C) (Tympanic)   Resp 16   Wt 78 kg (172 lb)   LMP 07/01/2018 (LMP Unknown)   BMI 27.54 kg/m   Estimated body mass index is 27.54 kg/m  as calculated from the following:    Height as of 8/6/20: 1.683 m (5' 6.26\").    Weight as of this encounter: 78 kg (172 lb).  Medication Reconciliation: complete    Cindi Hernandez, LEX      "

## 2021-01-04 NOTE — PROGRESS NOTES
HPI:    Yohana Robertson is a 30 year old female  who presents to [facility] today for foot pain.    States she dropped a dresser drawer on her left foot this afternoon, about 3 hours ago.  States swelling, pain, numbness, tingling and bruising across the top of the left foot.  Painful to walk.  States she iced her foot immediately.      States chronic lower back pain that is worsening.  States it worsened when she was in a walking boot after her ankle surgery in August.  States her back pain has become severe over the past 3 days.   She was unable to complete her physical therapy today for her back due to the pain.  Back pain is starting to radiate down her right leg.  States she is having popping in her back with bending and straightening of her back.  States both legs feel weak.  States numbness and tingling in right leg, mild but present only for the past few days.  States she has unexplained bruising on her right lower back.  States her back pain is worsened by working and having to stand on her feet for 8 hours.  No bowel or bladder dysfunction or incontinence.    Taking Ibuprofen 800 mg twice daily for the past few days for her back pain.  She is taking Zanaflex for the past 3 days due to increased back pain.  Icing her back.  She goes to physical therapy twice weekly.           Past Medical History:   Diagnosis Date     Bunion of great toe of left foot     No Comments Provided     Contraceptive management     started on ALYSSIA     Endometriosis 07/2018    diagnosed laparoscopically     Past Surgical History:   Procedure Laterality Date     ARTHROSCOPY ANKLE, OPEN REPAIR LIGAMENT, COMBINED Right 8/6/2020    Procedure: Posterior ARTHROSCOPY, ANKLE, WITH OPEN REPAIR OF ANKLE LIGAMENT, resection of os trigonum, FHL tenosyonovectomy;  Surgeon: Sean Salcedo DPM;  Location: [facility] OR     BUNIONECTOMY      4/2005,Left     COLONOSCOPY      7/2013,Normal     CYSTOSCOPY N/A 7/26/2018    Procedure: CYSTOSCOPY;;   Surgeon: Renate Ch MD;  Location:  OR     DENTAL SURGERY      wisdom teeth     FRACTURE SURGERY      6/13,L Foot-tarsometatarsal realignment arthrodesis with plate and screw fixation, capsule tendon balancing procedures about the first tarsometatarsal joint, Landry Gibson DPM  [facility]     hardware removal Left     7/26/2006,205448,REMOVAL OF FOREIGN BODY,Removal of harware L foot after bunionectomy [Other][[[[     LAPAROSCOPIC HYSTERECTOMY TOTAL, SALPINGECTOMY N/A 7/26/2018    Procedure: LAPAROSCOPIC HYSTERECTOMY TOTAL, SALPINGECTOMY;  Total Laparoscopic Hysterectomy & Bilateral Salpingectomy, Cystoscopy.;  Surgeon: Renate Ch MD;  Location:  OR     TONSILLECTOMY      12/2005     Social History     Tobacco Use     Smoking status: Current Every Day Smoker     Years: 9.00     Types: Cigarettes, Vaping Device     Smokeless tobacco: Never Used     Tobacco comment: using e-cig   Substance Use Topics     Alcohol use: Yes     Alcohol/week: 21.0 standard drinks     Comment: couple of brandys a night     Current Outpatient Medications   Medication Sig Dispense Refill     ibuprofen (ADVIL/MOTRIN) 600 MG tablet Take 1 tablet (600 mg) by mouth every 8 hours as needed for moderate pain 90 tablet 3     tiZANidine (ZANAFLEX) 2 MG tablet 1-2 tabs PO TID PRN muscle spasm 30 tablet 1     Allergies   Allergen Reactions     Codeine Shortness Of Breath and Hives     itching     Peanuts [Nuts] Shortness Of Breath     Hives         Past medical history, past surgical history, current medications and allergies reviewed and accurate to the best of my knowledge.        ROS:  Refer to HPI    /70 (BP Location: Left arm, Patient Position: Sitting, Cuff Size: Adult Regular)   Pulse 88   Temp 98.8  F (37.1  C) (Tympanic)   Resp 16   Wt 78 kg (172 lb)   LMP 07/01/2018 (LMP Unknown)   BMI 27.54 kg/m      EXAM:  General Appearance: Miserable but non ill appearing adult female, appropriate appearance  for age. No acute distress  Respiratory: normal chest wall and respirations.  Normal effort.   No cough appreciated.  Cardiovascular:  CMS intact to bilateral lower extremities, brisk capillary refill, strong pedal pulses, no lower extremity edema.   Musculoskeletal:  Equal movement of bilateral upper extremities.  Equal movement of bilateral lower extremities.  Normal gait.  No foot drop.  Generalized lumbar spinal tenderness to palpation.  Generalized bilateral paraspinal lumbar area tenderness to palpation.  Bilateral SI area tenderness to palpation.  Bilateral lateral hips non tender to palpation.  Decreased straight leg raise bilaterally.  Able to stand on tip toes but states increased pain and unable to ambulate on tip toes.  Able to stand on heels but states increased pain and unable to ambulate on heels.  Decreased bend at the waist, able to get distal tips of fingers just about to knees.  Moves slowly and guarded.    Dermatological: Skin intact to back without noted bruising, erythema or rash.  Left foot with minimal bruising on dorsal mid foot, no abrasion.   Psychological: normal affect, alert, oriented, and pleasant.         Xray:  Results for orders placed or performed during the hospital encounter of 01/04/21   XR Lumbar Spine 2/3 Views     Status: None    Narrative    Exam: XR LUMBAR SPINE 2-3 VIEWS     History:Female, age 30 years, Chronic right-sided low back pain  without sciatica; Chronic right-sided low back pain without sciatica;  Acute right-sided low back pain with right-sided sciatica    Comparison:  7/27/2017    Technique: Three views are submitted.    Findings: Bones are normally mineralized. No evidence of acute or  subacute fracture. No evidence of acute subluxation. There is mild  generalized straightening in the upper lumbar spine which is not  significantly changed.           Impression    Impression:  No evidence of acute or subacute bony abnormality.    THAD LORENZ MD   Results  for orders placed or performed during the hospital encounter of 01/04/21   XR Foot Left G/E 3 Views     Status: None    Narrative    Exam: XR FOOT LT G/E 3 VW     History:Female, age 30 years, Foot injury, left, initial encounter;  Left foot pain    Comparison:  None    Technique: Three views are submitted.    Findings: Bones are normally mineralized. No evidence of acute or  subacute fracture.  No evidence of dislocation.  Postoperative changes  are seen consistent with distal first metatarsal osteotomy and fusion  of the first tarsometatarsal joint.           Impression    Impression:  No evidence of acute or subacute bony abnormality.     Postoperative changes of the first digit without acute abnormality.    THAD LORENZ MD           ASSESSMENT/PLAN:    I have reviewed the nursing notes.  I have reviewed the findings, diagnosis, plan and need for follow up with the patient.    1. Chronic right-sided low back pain without sciatica    - XR Lumbar Spine 2/3 Views; Future    - Lidocaine (LIDOCARE) 4 % Patch; Use for 12 hours and remove for 12 hours.  To prevent lidocaine toxicity, patient should be patch free for 12 hrs daily.  Dispense: 15 patch; Refill: 0    Xray of lumbar spine completed and reviewed, no obvious loss of disc height, radiologist over read:  No evidence of acute or subacute bony abnormality.    Continue physical therapy twice weekly  Continue Ibuprofen 600 mg to 800 mg BID PRN  Continue Tizanidine PRN  Continue to ice frequently  Recommend alternating ice with heat    Follow up with PCP if symptoms worsen or concerns    2. Acute right-sided low back pain with right-sided sciatica    - XR Lumbar Spine 2/3 Views; Future    - Lidocaine (LIDOCARE) 4 % Patch; Use for 12 hours and remove for 12 hours.  To prevent lidocaine toxicity, patient should be patch free for 12 hrs daily.  Dispense: 15 patch; Refill: 0    Xray of lumbar spine completed and reviewed, no obvious loss of disc height, radiologist  over read:  No evidence of acute or subacute bony abnormality.    Continue physical therapy twice weekly  Continue Ibuprofen 600 mg to 800 mg BID PRN  Continue Tizanidine PRN  Continue to ice frequently  Recommend alternating ice with heat    Follow up with PCP if symptoms worsen or concerns    3. Foot injury, left, initial encounter    - XR Foot Left G/E 3 Views; Future    Xray of left foot completed and reviewed, no obvious fracture appreciated, radiologist over read:  No evidence of acute or subacute bony abnormality.   Postoperative changes of the first digit without acute abnormality.    Discussed with patient she has a local contusion.  Recommend ice PRN  May use Ibuprofen BID PRN  WBAT    4. Left foot pain    - XR Foot Left G/E 3 Views; Future    Xray of left foot completed and reviewed, no obvious fracture appreciated, radiologist over read:  No evidence of acute or subacute bony abnormality.   Postoperative changes of the first digit without acute abnormality.    Discussed with patient she has a local contusion.  Recommend ice PRN  May use Ibuprofen BID PRN  WBAT          I explained my diagnostic considerations and recommendations to the patient, who voiced understanding and agreement with the treatment plan. All questions were answered. We discussed potential side effects of any prescribed or recommended therapies, as well as expectations for response to treatments.    Disclaimer:  This note consists of words and symbols derived from keyboarding, dictation, or using voice recognition software. As a result, there may be errors in the script that have gone undetected. Please consider this when interpreting information found in this note.

## 2021-01-05 ENCOUNTER — OFFICE VISIT (OUTPATIENT)
Dept: FAMILY MEDICINE | Facility: OTHER | Age: 31
End: 2021-01-05
Attending: FAMILY MEDICINE
Payer: COMMERCIAL

## 2021-01-05 VITALS
TEMPERATURE: 97.8 F | HEART RATE: 59 BPM | OXYGEN SATURATION: 100 % | DIASTOLIC BLOOD PRESSURE: 72 MMHG | WEIGHT: 171.2 LBS | SYSTOLIC BLOOD PRESSURE: 108 MMHG | BODY MASS INDEX: 27.42 KG/M2 | RESPIRATION RATE: 18 BRPM

## 2021-01-05 DIAGNOSIS — S90.32XD CONTUSION OF LEFT FOOT, SUBSEQUENT ENCOUNTER: ICD-10-CM

## 2021-01-05 DIAGNOSIS — S33.6XXA SPRAIN OF SACROILIAC JOINT, INITIAL ENCOUNTER: Primary | ICD-10-CM

## 2021-01-05 PROCEDURE — 99213 OFFICE O/P EST LOW 20 MIN: CPT | Performed by: FAMILY MEDICINE

## 2021-01-05 PROCEDURE — G0463 HOSPITAL OUTPT CLINIC VISIT: HCPCS

## 2021-01-05 ASSESSMENT — PAIN SCALES - GENERAL: PAINLEVEL: WORST PAIN (10)

## 2021-01-05 NOTE — PROGRESS NOTES
There are no exam notes on file for this visit.    SUBJECTIVE:  Yohana Robertson  is a 30 year old female who comes in for follow up of her low back. I saw her in October. We gave her a burst of steroid and tizanadine. We sent her for PT and she did that for quite a while. She got better, but then worsened again around New Years Day. She did keep her girlfriend from falling when they were drinking.  She likely strained her back then.  It is hurting more and the more she does, the more it hurts.      She injured her foot when she dropped a dresser drawer on her left foot. She was seen yesterday in [facility]. She had LS spine xray yesterday that was negative. Her left foot xray was negative as well.     Past Medical, Family, and Social History reviewed and updated as noted below.   ROS is negative except as noted above       Allergies   Allergen Reactions     Codeine Shortness Of Breath and Hives     itching     Peanuts [Nuts] Shortness Of Breath     Hives   ,   Family History   Problem Relation Age of Onset     Heart Disease Mother         Heart Disease,Aortic stenosis and valve replaced     Family History Negative Father         Good Health     Heart Disease Maternal Grandfather         Heart Disease     Asthma Maternal Grandmother         Asthma   ,   Current Outpatient Medications   Medication     ibuprofen (ADVIL/MOTRIN) 600 MG tablet     Lidocaine (LIDOCARE) 4 % Patch     tiZANidine (ZANAFLEX) 2 MG tablet     No current facility-administered medications for this visit.    ,   Past Medical History:   Diagnosis Date     Bunion of great toe of left foot     No Comments Provided     Contraceptive management     started on ALYSSIA     Endometriosis 07/2018    diagnosed laparoscopically   ,   Patient Active Problem List    Diagnosis Date Noted     Tear of talofibular ligament of right lower extremity, subsequent encounter 06/26/2020     Priority: Medium     Added automatically from request for surgery [id number]        S/P laparoscopic hysterectomy 07/26/2018     Priority: Medium     Former smoker 07/03/2018     Priority: Medium     Asthma 02/11/2018     Priority: Medium     Attention deficit disorder 02/11/2018     Priority: Medium     Acute bilateral low back pain with sciatica 07/27/2017     Priority: Medium     Anxiety 01/09/2014     Priority: Medium     Severe episode of recurrent major depressive disorder (H) 12/26/2013     Priority: Medium     Nonallopathic lesion of lumbar region 08/08/2013     Priority: Medium     IBS (irritable bowel syndrome) 03/28/2013     Priority: Medium     Bunion, left foot 04/11/2012     Priority: Medium     Common migraine 04/20/2011     Priority: Medium   ,   Past Surgical History:   Procedure Laterality Date     ARTHROSCOPY ANKLE, OPEN REPAIR LIGAMENT, COMBINED Right 8/6/2020    Procedure: Posterior ARTHROSCOPY, ANKLE, WITH OPEN REPAIR OF ANKLE LIGAMENT, resection of os trigonum, FHL tenosyonovectomy;  Surgeon: Sean Salcedo DPM;  Location:  OR     BUNIONECTOMY      4/2005,Left     COLONOSCOPY      7/2013,Normal     CYSTOSCOPY N/A 7/26/2018    Procedure: CYSTOSCOPY;;  Surgeon: Renate Ch MD;  Location:  OR     DENTAL SURGERY      wisdom teeth     FRACTURE SURGERY      6/13,L Foot-tarsometatarsal realignment arthrodesis with plate and screw fixation, capsule tendon balancing procedures about the first tarsometatarsal joint, Landry Gibson DPM  [facility]     hardware removal Left     7/26/2006,205448,REMOVAL OF FOREIGN BODY,Removal of harware L foot after bunionectomy [Other][[[[     LAPAROSCOPIC HYSTERECTOMY TOTAL, SALPINGECTOMY N/A 7/26/2018    Procedure: LAPAROSCOPIC HYSTERECTOMY TOTAL, SALPINGECTOMY;  Total Laparoscopic Hysterectomy & Bilateral Salpingectomy, Cystoscopy.;  Surgeon: Renate Ch MD;  Location:  OR     TONSILLECTOMY      12/2005    and   Social History     Tobacco Use     Smoking status: Current Every Day Smoker     Years: 9.00     Types:  Cigarettes, Vaping Device     Smokeless tobacco: Never Used     Tobacco comment: using e-cig   Substance Use Topics     Alcohol use: Yes     Alcohol/week: 21.0 standard drinks     Comment: couple of brandys a night     OBJECTIVE:  /72 (BP Location: Left arm, Patient Position: Sitting, Cuff Size: Adult Regular)   Pulse 59   Temp 97.8  F (36.6  C) (Tympanic)   Resp 18   Wt 77.7 kg (171 lb 3.2 oz)   LMP 07/01/2018 (LMP Unknown)   SpO2 100%   Breastfeeding No   BMI 27.42 kg/m     EXAM:  Alert and cooperative, no distress.  Moves slowly with pain behavior.  She has exquisite tenderness to palpation over her right SI joint.  Standing on her right foot and extending her back seems to exacerbate her symptoms.  She has some paraspinal muscle spasm and decreased range of motion of the lumbar spine but no radicular findings.  No sciatic notch tenderness.  Examination of her left foot reveals some minimal ecchymosis and no significant swelling consistent with contusion.  X-ray of her low back and her left foot were reviewed.  ASSESSMENT/Plan :    Yohana was seen today for back pain.    Diagnoses and all orders for this visit:    Sprain of sacroiliac joint, initial encounter  -     XR Sacroiliac Diagnostic Injection Right; Future    Contusion of left foot, subsequent encounter      Recommended that she do ice and anti-inflammatories for her right SI joint.  Commonsense with activity is advised.  She will continue with physical therapy and we referred her for an injection into her right SI joint to see if we could expedite her improvement.  Continue with symptomatic treatment for her left foot.  Advise follow-up if worsening or not improving over the next couple weeks.    Oscar Robertson MD

## 2021-01-06 ENCOUNTER — TELEPHONE (OUTPATIENT)
Dept: FAMILY MEDICINE | Facility: OTHER | Age: 31
End: 2021-01-06

## 2021-01-06 NOTE — TELEPHONE ENCOUNTER
Karen gutierrez at [facility] states they need clarification for  the script for lidocaine patch, wondering if it is daily, daily as needed, or every x days?  Can [facility] advise, script was written from provider in  but patient did see [facility] yesterday.   Does not need new script a call with verbal is ok, ask for matt.   Jazzmine Shepherd LPN .............1/6/2021     2:58 PM

## 2021-01-06 NOTE — TELEPHONE ENCOUNTER
Pharmacy notified they will let patient know when it is ready.   Jazzmine Shepherd LPN .............1/6/2021     4:20 PM

## 2021-01-07 ENCOUNTER — TELEPHONE (OUTPATIENT)
Dept: FAMILY MEDICINE | Facility: OTHER | Age: 31
End: 2021-01-07

## 2021-01-07 NOTE — TELEPHONE ENCOUNTER
received a call from [facility] regarding patient in regards to getting her SI joint injection covered for her insurance.  spoke with Tiana in [facility] who stated IM care needs office notes that at least 3 of the following tests were performed; provacative thigh, thigh thrust, imaging of the hips and/or SI joint, compression test, distraction test, Dariel sign test, posterior provacative test, gaenslen test, or a nathaniel test.   Tiana or Hilda are the contacts through [facility].  Reva Kong LPN on 1/7/2021 at 4:53 PM

## 2021-01-11 ENCOUNTER — TELEPHONE (OUTPATIENT)
Dept: FAMILY MEDICINE | Facility: OTHER | Age: 31
End: 2021-01-11

## 2021-01-11 DIAGNOSIS — S33.6XXA SPRAIN OF SACROILIAC JOINT, INITIAL ENCOUNTER: Primary | ICD-10-CM

## 2021-01-11 NOTE — TELEPHONE ENCOUNTER
She didn't have specific symptoms that would necessitate or qualify for MR of her lumbar spine. I don't think she has a surgical problem and getting into a back specialist takes a long time. It may be worthwhile to have her see Dr. Paris or Dr. Prabhakar for chiropractic evaluation as a next step. Referral sent.  Oscar Robertson MD on 1/11/2021 at 4:53 PM

## 2021-01-11 NOTE — TELEPHONE ENCOUNTER
Patient called to schedule with Dr. YG Mitchell, who is unable to see patient for her back pain. Patient does not wish to schedule back injections at this time without discussing with J or seeing someone who specializes in spinal injuries. Patient is wondering if instead of the back injections if she could possibly do a lumbar MRI. Is this something she would need to follow up in neurosurgery for? If so, please place referral and or order for MRI.    Destiny Price on 1/11/2021 at 2:00 PM

## 2021-01-11 NOTE — TELEPHONE ENCOUNTER
Called and spoke to pt after confirming last name and . Told pt [facility] note. She did state she was willing to do the chiropractor as a next step.    Giuliano Barnes LPN .......  2021  5:08 PM

## 2021-01-11 NOTE — TELEPHONE ENCOUNTER
May benefit from seeing Dr. Lopez in sports medicine.  Oscar Robertson MD on 1/11/2021 at 11:30 AM

## 2021-01-13 ENCOUNTER — HOSPITAL ENCOUNTER (OUTPATIENT)
Dept: PHYSICAL THERAPY | Facility: OTHER | Age: 31
Setting detail: THERAPIES SERIES
End: 2021-01-13
Attending: PODIATRIST
Payer: COMMERCIAL

## 2021-01-13 ENCOUNTER — THERAPY VISIT (OUTPATIENT)
Dept: CHIROPRACTIC MEDICINE | Facility: OTHER | Age: 31
End: 2021-01-13
Attending: CHIROPRACTOR
Payer: COMMERCIAL

## 2021-01-13 DIAGNOSIS — M99.04 SEGMENTAL AND SOMATIC DYSFUNCTION OF SACRAL REGION: Primary | ICD-10-CM

## 2021-01-13 DIAGNOSIS — M54.41 BILATERAL LOW BACK PAIN WITH BILATERAL SCIATICA, UNSPECIFIED CHRONICITY: ICD-10-CM

## 2021-01-13 DIAGNOSIS — S33.6XXA SPRAIN OF SACROILIAC JOINT, INITIAL ENCOUNTER: ICD-10-CM

## 2021-01-13 DIAGNOSIS — M54.42 BILATERAL LOW BACK PAIN WITH BILATERAL SCIATICA, UNSPECIFIED CHRONICITY: ICD-10-CM

## 2021-01-13 PROCEDURE — G0463 HOSPITAL OUTPT CLINIC VISIT: HCPCS

## 2021-01-13 PROCEDURE — 98940 CHIROPRACT MANJ 1-2 REGIONS: CPT | Mod: AT | Performed by: CHIROPRACTOR

## 2021-01-13 PROCEDURE — 97012 MECHANICAL TRACTION THERAPY: CPT | Mod: GP | Performed by: PHYSICAL THERAPIST

## 2021-01-13 PROCEDURE — 99213 OFFICE O/P EST LOW 20 MIN: CPT | Mod: 25 | Performed by: CHIROPRACTOR

## 2021-01-13 NOTE — PROGRESS NOTES
PATIENT:  Yohana Robertson is a 30 year old female presenting for lower back pain    PROBLEM:   Date of Initial Visit for this Episode:  1/13/2021     Visit #1    SUBJECTIVE / HPI: Patient presents with complicated history of ongoing back pain.  Patient states that back pain seem to begin approximately 10 years ago.  At first patient believes this may have been due to the birth of her first child.  Patient then remembered that she also fell on a bar landing on the TL junction of her back around that same time.  Believes 1 of these 2 instances may have first sparked back pain.    Approximately 2 years afterward patient was pregnant with second child and was informed that she should have undergone back surgery however that she is unsure about what this actually was meant to address, believed it may have been due to accumulation of scar tissue.    Patient did treat with chiropractic care under the direction of Dr. Raina GRANDE for back pain from pregnancy.  Found this to be beneficial.    Since that time patient has been dealing with consistently worsening back pain which seems to have been persistent over the past 2 years.  Patient states that during this time back pain does show improvement but never fully resolves.  Also notes that back pain seems to be easily irritated.  This is the case with current back pain episode.    Notes that approximately 1.5 months ago she underwent ankle surgery and believe that wearing postsurgical boot aggravated back pain.  Patient has been treating with physical therapy under the direction of Travon Cyr PT. has been finding relief with course of treatment until recently.      3 weeks ago patient was attempting to catch her girlfriend at a [employer] party which aggravated back pain.  Was evaluated at the [facility] clinic and x-rays were taken, as are available for my review. Has been continuing to treat with physical therapy.  This included today's visit.  I was informed  by patient's physical therapist that they attempted to treat with light traction which seem to actually aggravate symptoms.     Dr. Marcelo CAICEDO referred patient for chiropractic evaluation and treatment.     No reported loss of bowel/bladder function or saddle paresthesias.    Description and onset:  Duration and Frequency of Pain: 3 weeks and constant achey, sharp and popping  Radiation of pain: bilaterally to the knees  Pain rated at it's worst: 10/10  Pain rated currently:  8/10  Pain course: Improved initially, worsened recently  Worse with:  Standing, sitting, general movement  Improved by:  Heat and Other medication  Other Health Care Providers seen for this: Travon Cyr PT, Dr. Marcelo CAICEDO, Dr. Raina HAYWARD   Previous treatment: physical therapy, medication, chiropractic          See flowsheets in chart for details.  1/13/2021    Oswestry (CAT) Questionnaire    OSWESTRY DISABILITY INDEX 1/13/2021   Count 9   Sum 28   Oswestry Score (%) 62.22   Some recent data might be hidden      Functional limitations:  Walking >100 yds, sitting >10 minutes, sleeping, lifting    Past D.C. Care: yes, helpful       PAST MEDICAL HISTORY:  Past Medical History:   Diagnosis Date     Bunion of great toe of left foot     No Comments Provided     Contraceptive management     started on ALYSSIA     Endometriosis 07/2018    diagnosed laparoscopically       PAST SURGICAL HISTORY:  Past Surgical History:   Procedure Laterality Date     ARTHROSCOPY ANKLE, OPEN REPAIR LIGAMENT, COMBINED Right 8/6/2020    Procedure: Posterior ARTHROSCOPY, ANKLE, WITH OPEN REPAIR OF ANKLE LIGAMENT, resection of os trigonum, FHL tenosyonovectomy;  Surgeon: Sean Salcedo DPM;  Location:  OR     BUNIONECTOMY      4/2005,Left     COLONOSCOPY      7/2013,Normal     CYSTOSCOPY N/A 7/26/2018    Procedure: CYSTOSCOPY;;  Surgeon: Renate Ch MD;  Location:  OR     DENTAL SURGERY      wisdom teeth     FRACTURE SURGERY      6/13,L Foot-tarsometatarsal  realignment arthrodesis with plate and screw fixation, capsule tendon balancing procedures about the first tarsometatarsal joint, Landry Gibson DPM  [facility]     hardware removal Left     7/26/2006,205448,REMOVAL OF FOREIGN BODY,Removal of harware L foot after bunionectomy [Other][[[[     LAPAROSCOPIC HYSTERECTOMY TOTAL, SALPINGECTOMY N/A 7/26/2018    Procedure: LAPAROSCOPIC HYSTERECTOMY TOTAL, SALPINGECTOMY;  Total Laparoscopic Hysterectomy & Bilateral Salpingectomy, Cystoscopy.;  Surgeon: Renate Ch MD;  Location: [facility] OR     TONSILLECTOMY      12/2005       ALLERGIES:  Allergies   Allergen Reactions     Codeine Shortness Of Breath and Hives     itching     Peanuts [Nuts] Shortness Of Breath     Hives       CURRENT MEDICATIONS:  Current Outpatient Medications   Medication Sig Dispense Refill     ibuprofen (ADVIL/MOTRIN) 600 MG tablet Take 1 tablet (600 mg) by mouth every 8 hours as needed for moderate pain 90 tablet 3     Lidocaine (LIDOCARE) 4 % Patch Use for 12 hours and remove for 12 hours.  To prevent lidocaine toxicity, patient should be patch free for 12 hrs daily. 15 patch 0     tiZANidine (ZANAFLEX) 2 MG tablet 1-2 tabs PO TID PRN muscle spasm 30 tablet 1       SOCIAL HISTORY:    Children: yes.  Occupation: Employee at [employer].  Alcohol use:yes.  Tobacco use: Smoker: yes.      FAMILY HISTORY:  Family History   Problem Relation Age of Onset     Heart Disease Mother         Heart Disease,Aortic stenosis and valve replaced     Family History Negative Father         Good Health     Heart Disease Maternal Grandfather         Heart Disease     Asthma Maternal Grandmother         Asthma       Patient Active Problem List   Diagnosis     Acute bilateral low back pain with sciatica     Anxiety     Asthma     Attention deficit disorder     Bunion, left foot     Common migraine     IBS (irritable bowel syndrome)     Severe episode of recurrent major depressive disorder (H)      Nonallopathic lesion of lumbar region     Former smoker     S/P laparoscopic hysterectomy     Tear of talofibular ligament of right lower extremity, subsequent encounter         ROS:  The patient denies any fevers, chills, nausea, vomiting, diarrhea, constipation,dysuria, hematuria, or urinary hesitancy or incontinence.  No shortness of breath, chest pain, or rashes.    OBJECTIVE:    DIAGNOSTICS:  Study Result    Exam: XR LUMBAR SPINE 2-3 VIEWS      History:Female, age 30 years, Chronic right-sided low back pain  without sciatica; Chronic right-sided low back pain without sciatica;  Acute right-sided low back pain with right-sided sciatica     Comparison:  7/27/2017     Technique: Three views are submitted.     Findings: Bones are normally mineralized. No evidence of acute or  subacute fracture. No evidence of acute subluxation. There is mild  generalized straightening in the upper lumbar spine which is not  significantly changed.                                                                           Impression:  No evidence of acute or subacute bony abnormality.     THAD LORENZ MD     I was able to personally review patient's xrays. Possible dagger sign of the lower thoracic/upper lumbar spinal region. SI joints are ill defined, right greater than left.    PHYSICAL EXAM:     GENERAL APPEARANCE: healthy, alert, moderate distress and cooperative   GAIT: antalgic anteriorly. Walking with hesitancy      MUSCULOSKELETAL:   Posture: Anterior head carriage, rounded shoulders.  Low left iliac crest, Low left shoulder, anterior antalgia   Gait: see prior comment        Thoracic and Lumbar performed actively, measured approximately  ROM:  50/60 flexion 35/60 extension    40/45 RLF    30/45 LLF   30/45 RR      35/45 LR  Pain present with all AROM.    +Kemps: left side with extension   + Straight leg raise bilaterally  + CISCO: left sacroiliac jt pain, restricted ROM.   Other:  Ely's: -right, + left    +Tenderness:  Present over the left PSIS  +Muscle spasm: Lumbar paraspinals and quadratus lumborum bilaterally, left gluteus medius.   +Joint asymmetry and restriction: Sacrum P-L listing    ASSESSMENT: Yohana Robertson is a 30 year old female presenting with primary complaints of low back pain with bilateral sciatica. Patient's case is quite complex. While there is evidence of segmental/somatic dysfunction of the SI joints I believe there may be several other factors contributing to her symptoms. In reviewing her chart back pain has been ongoing for several years and often will severely flare, with little effort. Reviewing patients most recent x-rays her SI joint lines are ill defined and there may be a possible dagger sign in the upper lumbar/lower thoracic region, both of these could indicate ankylosing spondylitis. I did relay my concerns to Dr. Robertson who agreed that more investigation should be pursued.    Secondly, patient has had multiple foot and ankle surgeries. This may also be contributing to back pain. Patient may benefit from shoe inserts or orthotics.    Patient does appear to be a fair candidate for chiropractic care. I believe treatment will help her symptoms, but further evaluation may also be warranted.       1. Segmental and somatic dysfunction of sacral region    2. Sprain of sacroiliac joint, initial encounter    3. Bilateral low back pain with bilateral sciatica, unspecified chronicity        PLAN    Evaluation and Management:  37887 Moderate exam established patient 20 min    Procedures:  Modalities:  None performed this visit    CMT:  95198 Chiropractic manipulative treatment 1-2 regions performed   Pelvis: Drop Table and SOT blocks, Sacrum , Prone    Therapeutic procedures:  None  Encouraged use of trochanteric/SI joint belt    Response to Treatment  Reduction in symptoms as reported by patient    Prognosis: Good    1/13/2021 Plan of Care:  6-8 visits of Chiropractic Care including Spinal Adjustments  and/or physiotherapy and active rehabilitation, to include exercises in the office and/or at home to meet care plan goals.     Frequency: 2xweek for up to 4 weeks. A reevaluation would be clinically appropriate in 6-8 visits, to determine progress and further course of care.    POC discussed and patient agreeable to plan of care.      1/13/2021 Goals:      Patient will report improved pain by 50%.   Patient will report able to stand with 50% improvement.   Patient will report able to walk 50% improvement.   Patient will demonstrate an improved ability to complete Activities of Daily Living  as shown by a reported 10-30% reduced score on neck and/or back index.    Patient will demonstrate improved ROM.        INSTRUCTIONS   ice 20 minutes every other hour as needed, heat 15 minutes every other hour as needed and Consider trochanteric belt as discussed    Follow-up:  Return to care in 5 days.        Disclaimer: This note consists of symbols derived from keyboarding, dictation and/or voice recognition software. As a result, there may be errors in the script that have gone undetected. Please consider this when interpreting information found in this chart.

## 2021-01-14 NOTE — PATIENT INSTRUCTIONS
ice 20 minutes every other hour as needed, heat 15 minutes every other hour as needed and Consider trochanteric belt as discussed

## 2021-01-18 ENCOUNTER — HOSPITAL ENCOUNTER (OUTPATIENT)
Dept: PHYSICAL THERAPY | Facility: OTHER | Age: 31
Setting detail: THERAPIES SERIES
End: 2021-01-18
Attending: PODIATRIST
Payer: COMMERCIAL

## 2021-01-18 ENCOUNTER — THERAPY VISIT (OUTPATIENT)
Dept: CHIROPRACTIC MEDICINE | Facility: OTHER | Age: 31
End: 2021-01-18
Attending: CHIROPRACTOR
Payer: COMMERCIAL

## 2021-01-18 DIAGNOSIS — M54.50 BILATERAL LOW BACK PAIN WITHOUT SCIATICA, UNSPECIFIED CHRONICITY: ICD-10-CM

## 2021-01-18 DIAGNOSIS — S33.6XXA SPRAIN OF SACROILIAC JOINT, INITIAL ENCOUNTER: Primary | ICD-10-CM

## 2021-01-18 DIAGNOSIS — M99.04 SEGMENTAL AND SOMATIC DYSFUNCTION OF SACRAL REGION: ICD-10-CM

## 2021-01-18 PROCEDURE — 98940 CHIROPRACT MANJ 1-2 REGIONS: CPT | Mod: AT | Performed by: CHIROPRACTOR

## 2021-01-18 PROCEDURE — G0463 HOSPITAL OUTPT CLINIC VISIT: HCPCS

## 2021-01-18 PROCEDURE — 97110 THERAPEUTIC EXERCISES: CPT | Mod: GP | Performed by: PHYSICAL THERAPIST

## 2021-01-18 NOTE — PATIENT INSTRUCTIONS
ice 20 minutes every other hour as needed, heat 15 minutes every other hour as needed and look into trochanteric belts. Schedule with Dr. Robertson at convenience

## 2021-01-18 NOTE — PROGRESS NOTES
Visit #:  2    Subjective:  Yohana Robertson is a 30 year old female who is seen in f/u up for:        Sprain of sacroiliac joint, initial encounter  Segmental and somatic dysfunction of sacral region  Bilateral low back pain without sciatica, unspecified chronicity.     Since last visit on 1/13/2021,  Yohana Robertson reports: Following last visit states that symptoms did quite well for a couple of days.  Did that the first night within a few hours post chiropractic care she did experience sciatica symptoms into the left leg which did not seem to last for too long.  No sciatic symptoms are present at this current time.    Unable to further look into trochateric/SI joint belt. Plans to do so soon.    Continue with physical therapy which she finds to be beneficial.  I was able to discuss course of treatment with current physical therapist Travon Cyr PT prior to today's chiropractic visit. Travon informs me that Yohana feels 50% improvement.    Also was able to consult patient's primary provider about patient possibly having ankylosing spondylitis. Dr. Marcelo CAICEDO believes this may be possible and does warrant further investigation.    Area of chief complaint:  Lumbar :  Symptoms are graded at 8/10. The quality is described as constantly achey.       Objective:  The following was observed:    P: palpatory tenderness PSIS bilaterally L>>R:    A: static palpation demonstrates intersegmental asymmetry , pelvis  R: motion palpation notes restricted motion, Sacrum   T: Mild spasms left quadratus lumborum. Taut/tender fibers present along the lumbar paraspinals bilaterally and gluteus medius bilaterally    Segmental spinal dysfunction/restrictions found at:  :  Sacrum P-L listing.      Assessment: Patient is showing signs of progress.  Was able to consult with patient's primary provider as well as patient's physical therapist.  We will continue with course of chiropractic care at twice a week for the next 2 weeks.   Patient was informed on today's visit that at that time I will be unavailable due to my own medical leave of absence.  Should care continue to be necessary transfer of care will likely occur with Dr. Melany Muller has worked with Dr. Paris previously.     Diagnoses:      1. Sprain of sacroiliac joint, initial encounter    2. Segmental and somatic dysfunction of sacral region    3. Bilateral low back pain without sciatica, unspecified chronicity        Patient's condition:  Patient had restrictions pre-manipulation and Symptoms come and go    Treatment effectiveness:  Post manipulation there is better intersegmental movement and Post manipulation the patient improves and then feels more restricted motion over time      Procedures:  CMT:  46854 Chiropractic manipulative treatment 1-2 regions performed   Pelvis: Drop Table and SOT blocks, Sacrum , Prone    Modalities:  None performed this visit    Therapeutic procedures:  Deferred to physical therapy  Encouraged use of a trochanteric belt    Response to Treatment  Reduction in symptoms as reported by patient    Prognosis: Good    Progress towards Goals: Patient will report improved pain by 50%. In progress              Patient will report able to stand with 50% improvement. In progress              Patient will report able to walk 50% improvement. In progress              Patient will demonstrate an improved ability to complete Activities of Daily Living   as shown by a reported 10-30% reduced score on neck and/or back index.               Patient will demonstrate improved ROM.      Recommendations:    Instructions:ice 20 minutes every other hour as needed, heat 15 minutes every other hour as needed and look into trochanteric belts. Schedule with Dr. Robertson at convenience    Follow-up:  Return to care in 2 days.

## 2021-01-19 ENCOUNTER — OFFICE VISIT (OUTPATIENT)
Dept: FAMILY MEDICINE | Facility: OTHER | Age: 31
End: 2021-01-19
Attending: FAMILY MEDICINE
Payer: COMMERCIAL

## 2021-01-19 ENCOUNTER — HOSPITAL ENCOUNTER (OUTPATIENT)
Dept: GENERAL RADIOLOGY | Facility: OTHER | Age: 31
End: 2021-01-19
Attending: FAMILY MEDICINE
Payer: COMMERCIAL

## 2021-01-19 VITALS
OXYGEN SATURATION: 99 % | HEART RATE: 87 BPM | DIASTOLIC BLOOD PRESSURE: 78 MMHG | RESPIRATION RATE: 18 BRPM | WEIGHT: 165 LBS | TEMPERATURE: 97 F | SYSTOLIC BLOOD PRESSURE: 118 MMHG | BODY MASS INDEX: 26.42 KG/M2

## 2021-01-19 DIAGNOSIS — M54.50 CHRONIC RIGHT-SIDED LOW BACK PAIN WITHOUT SCIATICA: ICD-10-CM

## 2021-01-19 DIAGNOSIS — M46.1 SACROILIITIS (H): Primary | ICD-10-CM

## 2021-01-19 DIAGNOSIS — G89.29 CHRONIC RIGHT-SIDED LOW BACK PAIN WITHOUT SCIATICA: ICD-10-CM

## 2021-01-19 DIAGNOSIS — M46.1 SACROILIITIS (H): ICD-10-CM

## 2021-01-19 DIAGNOSIS — M54.41 ACUTE RIGHT-SIDED LOW BACK PAIN WITH RIGHT-SIDED SCIATICA: ICD-10-CM

## 2021-01-19 LAB
CRP SERPL-MCNC: 1 MG/L
ERYTHROCYTE [SEDIMENTATION RATE] IN BLOOD BY WESTERGREN METHOD: 2 MM/H (ref 1–15)
RHEUMATOID FACT SER NEPH-ACNC: <14 IU/ML (ref 0–20)

## 2021-01-19 PROCEDURE — 99213 OFFICE O/P EST LOW 20 MIN: CPT | Performed by: FAMILY MEDICINE

## 2021-01-19 PROCEDURE — 81374 HLA I TYPING 1 ANTIGEN LR: CPT | Mod: ZL | Performed by: FAMILY MEDICINE

## 2021-01-19 PROCEDURE — G0463 HOSPITAL OUTPT CLINIC VISIT: HCPCS | Mod: 25

## 2021-01-19 PROCEDURE — 86200 CCP ANTIBODY: CPT | Mod: ZL | Performed by: FAMILY MEDICINE

## 2021-01-19 PROCEDURE — 86431 RHEUMATOID FACTOR QUANT: CPT | Mod: ZL | Performed by: FAMILY MEDICINE

## 2021-01-19 PROCEDURE — 86140 C-REACTIVE PROTEIN: CPT | Mod: ZL | Performed by: FAMILY MEDICINE

## 2021-01-19 PROCEDURE — 72202 X-RAY EXAM SI JOINTS 3/> VWS: CPT

## 2021-01-19 PROCEDURE — 86038 ANTINUCLEAR ANTIBODIES: CPT | Mod: ZL | Performed by: FAMILY MEDICINE

## 2021-01-19 PROCEDURE — G0463 HOSPITAL OUTPT CLINIC VISIT: HCPCS

## 2021-01-19 PROCEDURE — 85652 RBC SED RATE AUTOMATED: CPT | Mod: ZL | Performed by: FAMILY MEDICINE

## 2021-01-19 PROCEDURE — 36415 COLL VENOUS BLD VENIPUNCTURE: CPT | Mod: ZL | Performed by: FAMILY MEDICINE

## 2021-01-19 RX ORDER — LIDOCAINE 4 G/G
PATCH TOPICAL
Qty: 15 PATCH | Refills: 3 | Status: SHIPPED | OUTPATIENT
Start: 2021-01-19 | End: 2022-04-21

## 2021-01-19 ASSESSMENT — PAIN SCALES - GENERAL: PAINLEVEL: EXTREME PAIN (8)

## 2021-01-19 NOTE — PROGRESS NOTES
There are no exam notes on file for this visit.    SUBJECTIVE:  Yohana Robertson  is a 30 year old female who comes in today for follow-up for her low back issues.  She has been seeing  for chiropractic treatment.  She has also been doing physical therapy.  They have been working together. There was some concern about possible ankylosing spondylitis.  We asked that they schedule her an appointment to come in to do labs for this.    No family history of rheumatoid arthritis lupus or ankylosing spondylitis that she is aware of.    Past Medical, Family, and Social History reviewed and updated as noted below.   ROS is negative except as noted above       Allergies   Allergen Reactions     Codeine Shortness Of Breath and Hives     itching     Peanuts [Nuts] Shortness Of Breath     Hives   ,   Family History   Problem Relation Age of Onset     Heart Disease Mother         Heart Disease,Aortic stenosis and valve replaced     Family History Negative Father         Good Health     Heart Disease Maternal Grandfather         Heart Disease     Asthma Maternal Grandmother         Asthma   ,   Current Outpatient Medications   Medication     ibuprofen (ADVIL/MOTRIN) 600 MG tablet     Lidocaine (LIDOCARE) 4 % Patch     tiZANidine (ZANAFLEX) 2 MG tablet     No current facility-administered medications for this visit.    ,   Past Medical History:   Diagnosis Date     Bunion of great toe of left foot     No Comments Provided     Contraceptive management     started on ALYSSIA     Endometriosis 07/2018    diagnosed laparoscopically   ,   Patient Active Problem List    Diagnosis Date Noted     Tear of talofibular ligament of right lower extremity, subsequent encounter 06/26/2020     Priority: Medium     Added automatically from request for surgery [id number]       S/P laparoscopic hysterectomy 07/26/2018     Priority: Medium     Former smoker 07/03/2018     Priority: Medium     Asthma 02/11/2018     Priority: Medium     Attention  deficit disorder 02/11/2018     Priority: Medium     Acute bilateral low back pain with sciatica 07/27/2017     Priority: Medium     Anxiety 01/09/2014     Priority: Medium     Severe episode of recurrent major depressive disorder (H) 12/26/2013     Priority: Medium     Nonallopathic lesion of lumbar region 08/08/2013     Priority: Medium     IBS (irritable bowel syndrome) 03/28/2013     Priority: Medium     Bunion, left foot 04/11/2012     Priority: Medium     Common migraine 04/20/2011     Priority: Medium   ,   Past Surgical History:   Procedure Laterality Date     ARTHROSCOPY ANKLE, OPEN REPAIR LIGAMENT, COMBINED Right 8/6/2020    Procedure: Posterior ARTHROSCOPY, ANKLE, WITH OPEN REPAIR OF ANKLE LIGAMENT, resection of os trigonum, FHL tenosyonovectomy;  Surgeon: Sean Salcedo DPM;  Location:  OR     BUNIONECTOMY      4/2005,Left     COLONOSCOPY      7/2013,Normal     CYSTOSCOPY N/A 7/26/2018    Procedure: CYSTOSCOPY;;  Surgeon: Renate Ch MD;  Location:  OR     DENTAL SURGERY      wisdom teeth     FRACTURE SURGERY      6/13,L Foot-tarsometatarsal realignment arthrodesis with plate and screw fixation, capsule tendon balancing procedures about the first tarsometatarsal joint, Landry Gibson DPM  [facility]     hardware removal Left     7/26/2006,205448,REMOVAL OF FOREIGN BODY,Removal of harware L foot after bunionectomy [Other][[[[     LAPAROSCOPIC HYSTERECTOMY TOTAL, SALPINGECTOMY N/A 7/26/2018    Procedure: LAPAROSCOPIC HYSTERECTOMY TOTAL, SALPINGECTOMY;  Total Laparoscopic Hysterectomy & Bilateral Salpingectomy, Cystoscopy.;  Surgeon: Renate Ch MD;  Location:  OR     TONSILLECTOMY      12/2005    and   Social History     Tobacco Use     Smoking status: Current Every Day Smoker     Years: 9.00     Types: Vaping Device     Smokeless tobacco: Never Used     Tobacco comment: using e-cig.. approx 25 mg daily   Substance Use Topics     Alcohol use: Yes     Alcohol/week: 21.0  standard drinks     Comment: couple of brandys a night     OBJECTIVE:  /78 (BP Location: Right arm, Patient Position: Sitting, Cuff Size: Adult Regular)   Pulse 87   Temp 97  F (36.1  C) (Tympanic)   Resp 18   Wt 74.8 kg (165 lb)   LMP 07/01/2018 (LMP Unknown)   SpO2 99%   Breastfeeding No   BMI 26.42 kg/m     EXAM:  Alert and cooperative, no distress.  Moves easily without significant pain behavior but is somewhat cautious.  She has no definite SI joint tenderness but standing on her right foot and extending her back seems to cause left SI joint region discomfort.  None on the right.  ASSESSMENT/Plan :    Yohana was seen today for recheck.    Diagnoses and all orders for this visit:    Sacroiliitis (H)  -     Cancel: XR Sacroiliac Joint 1/2 Views; Future  -     Sedimentation Rate (ESR); Future  -     CRP inflammation; Future  -     Rheumatoid factor; Future  -     Anti Nuclear Jud IgG by IFA with Reflex; Future  -     Cyclic Citrullinated Peptide Antibody IgG; Future  -     HLA-B27 Typing; Future  -     HLA-B27 Typing  -     Anti Nuclear Jud IgG by IFA with Reflex  -     Cyclic Citrullinated Peptide Antibody IgG  -     Rheumatoid factor  -     CRP inflammation  -     Sedimentation Rate (ESR)    Chronic right-sided low back pain without sciatica  -     Lidocaine (LIDOCARE) 4 % Patch; Use for 12 hours and remove for 12 hours.  To prevent lidocaine toxicity, patient should be patch free for 12 hrs daily.    Acute right-sided low back pain with right-sided sciatica  -     Lidocaine (LIDOCARE) 4 % Patch; Use for 12 hours and remove for 12 hours.  To prevent lidocaine toxicity, patient should be patch free for 12 hrs daily.      Will await labs and radiologist review of her SI joint x-rays.  We will notify her of the results when available.  Consider rheumatology appointment if appropriate.  Continue with PT and chiropractic treatment for her back and we will renew her lidocaine patches.    Oscar BUCK  MD Marcelo

## 2021-01-20 ENCOUNTER — THERAPY VISIT (OUTPATIENT)
Dept: CHIROPRACTIC MEDICINE | Facility: OTHER | Age: 31
End: 2021-01-20
Attending: CHIROPRACTOR
Payer: COMMERCIAL

## 2021-01-20 ENCOUNTER — HOSPITAL ENCOUNTER (OUTPATIENT)
Dept: PHYSICAL THERAPY | Facility: OTHER | Age: 31
Setting detail: THERAPIES SERIES
End: 2021-01-20
Attending: PODIATRIST
Payer: COMMERCIAL

## 2021-01-20 DIAGNOSIS — M54.50 BILATERAL LOW BACK PAIN WITHOUT SCIATICA, UNSPECIFIED CHRONICITY: ICD-10-CM

## 2021-01-20 DIAGNOSIS — S33.6XXA SPRAIN OF SACROILIAC JOINT, INITIAL ENCOUNTER: Primary | ICD-10-CM

## 2021-01-20 DIAGNOSIS — M99.04 SEGMENTAL AND SOMATIC DYSFUNCTION OF SACRAL REGION: ICD-10-CM

## 2021-01-20 DIAGNOSIS — M54.41 ACUTE RIGHT-SIDED LOW BACK PAIN WITH RIGHT-SIDED SCIATICA: ICD-10-CM

## 2021-01-20 LAB
ANA SER QL IF: NEGATIVE
CCP AB SER IA-ACNC: 1 U/ML

## 2021-01-20 PROCEDURE — 97110 THERAPEUTIC EXERCISES: CPT | Mod: GP | Performed by: PHYSICAL THERAPIST

## 2021-01-20 PROCEDURE — G0463 HOSPITAL OUTPT CLINIC VISIT: HCPCS

## 2021-01-20 PROCEDURE — 98940 CHIROPRACT MANJ 1-2 REGIONS: CPT | Mod: AT | Performed by: CHIROPRACTOR

## 2021-01-20 RX ORDER — TIZANIDINE 2 MG/1
TABLET ORAL
Qty: 30 TABLET | Refills: 1 | Status: SHIPPED | OUTPATIENT
Start: 2021-01-20 | End: 2022-04-21

## 2021-01-20 NOTE — PROGRESS NOTES
Visit #:  3    Subjective:  Yohana Robertson is a 30 year old female who is seen in f/u up for:        Sprain of sacroiliac joint, initial encounter  Segmental and somatic dysfunction of sacral region  Bilateral low back pain without sciatica, unspecified chronicity.     Since last visit on 1/18/2021,  Yohana Robertson reports: Symptoms doing quite well after last visit.  Patient underwent massage therapy with Erik Moura LMT and found this to be very beneficial noticing that her back popped several times.  Still unclear of triggering factors regarding back pain.  Patient states that she can do the same task and have her back be aggravated 1 time and it did not occur at the same again.    Was able to follow-up with primary care provider Dr. Marcelo CAICEDO to evaluate further for ankylosing spondylitis diagnosis.  X-rays were taken and are available for my review.  At this time HLA-B 27 testing still pending.    I was able to consult with patient's physical therapist prior to today's visit who informs me that with physical therapy exercises symptoms of low back pain seem to improve.  Prior to this while driving to the appointment patient's pain levels did increase somewhat.    Area of chief complaint:  Lumbar :  Symptoms are graded at 1-4/10. The quality is described as achey, occasionally.       Objective:  The following was observed:Study Result    XR SACROILIAC JOINT G/E 3VW     HISTORY: Sacroiliitis (H) .     COMPARISON: None.     TECHNIQUE: 4 views of the sacroiliac joints.     FINDINGS:     There is mild osteophytosis of both sacroiliac joints. No subchondral  sclerosis or ankylosis is seen. No acute or healing fracture is  identified. The hip joint spaces appear preserved.                                                                        IMPRESSION:      Mild osteophytosis of the sacroiliac joints.       TALIA BISWAS MD         P: palpatory tenderness non elicited:    A: static palpation  demonstrates intersegmental asymmetry , pelvis  R: motion palpation notes restricted motion, Sacrum   T: No apparent spasms.  Mild hypertonicity noted quadratus lumborum right side    Segmental spinal dysfunction/restrictions found at:  :  Sacrum Right lateral flexion restricted and Extension restriction.      Assessment: Symptoms showing quite a bit of improvement.  Plan to follow-up with patient again early next week.  Patient again informed of my upcoming medical leave of absence.    Diagnoses:      1. Sprain of sacroiliac joint, initial encounter    2. Segmental and somatic dysfunction of sacral region    3. Bilateral low back pain without sciatica, unspecified chronicity        Patient's condition:  Patient had restrictions pre-manipulation and Patient is noticing a decrease in their symptoms    Treatment effectiveness:  Post manipulation there is better intersegmental movement, Patient claims to feel looser post manipulation and Patients symptoms are getting better      Procedures:  CMT:  87665 Chiropractic manipulative treatment 1-2 regions performed   Pelvis: Drop Table and SOT blocks, Sacrum , Prone    Modalities:  None performed this visit    Therapeutic procedures:  Deferred to physical therapy    Response to Treatment  Reduction in symptoms as reported by patient    Prognosis: Good    Progress towards Goals: Patient will report improved pain by 50%. In progress              Patient will report able to stand with 50% improvement. In progress              Patient will report able to walk 50% improvement. In progress              Patient will demonstrate an improved ability to complete Activities of Daily Living   as shown by a reported 10-30% reduced score on neck and/or back index.               Patient will demonstrate improved ROM    Recommendations:    Instructions:Continue with home therapy exercises from physical therapy, continue to look into SI/trochanteric belt    Follow-up:  Return to care in 5  days.

## 2021-01-20 NOTE — TELEPHONE ENCOUNTER
Disp Refills Start End BRIDGET   tiZANidine (ZANAFLEX) 2 MG tablet 30 tablet 1 10/22/2020  No   Si-2 tabs PO TID PRN muscle spasm       LOV: 2021  Future Office visit: No future appointment scheduled at this time.     Routing refill request to provider for review/approval because:  Drug not on the [facility] or [facility] refill protocol or controlled substance    Requested Prescriptions   Pending Prescriptions Disp Refills     tiZANidine (ZANAFLEX) 2 MG tablet [Pharmacy Med Name: TIZANIDINE 2MG TABLET] 30 tablet 1     Sig: TAKE 1 TO 2 TABLETS BY MOUTH THREE TIMES DAILY AS NEEDED FOR MUSCLESPASM       There is no refill protocol information for this order        Unable to complete prescription refill per RN Medication Refill Policy.................... Mary Sandoval RN ....................  2021   1:41 PM

## 2021-01-20 NOTE — PATIENT INSTRUCTIONS
Continue with home therapy exercises from physical therapy, continue to look into SI/trochanteric belt

## 2021-01-25 DIAGNOSIS — M46.1 SACROILIITIS (H): Primary | ICD-10-CM

## 2021-01-25 DIAGNOSIS — Z15.89 HLA B27 (HLA B27 POSITIVE): ICD-10-CM

## 2021-01-25 LAB
B LOCUS: NORMAL
B27TEST METHOD: NORMAL

## 2021-01-28 ENCOUNTER — THERAPY VISIT (OUTPATIENT)
Dept: CHIROPRACTIC MEDICINE | Facility: OTHER | Age: 31
End: 2021-01-28
Attending: CHIROPRACTOR
Payer: COMMERCIAL

## 2021-01-28 DIAGNOSIS — M99.04 SEGMENTAL AND SOMATIC DYSFUNCTION OF SACRAL REGION: ICD-10-CM

## 2021-01-28 DIAGNOSIS — S33.6XXA SPRAIN OF SACROILIAC JOINT, INITIAL ENCOUNTER: Primary | ICD-10-CM

## 2021-01-28 PROCEDURE — 98940 CHIROPRACT MANJ 1-2 REGIONS: CPT | Mod: AT | Performed by: CHIROPRACTOR

## 2021-01-28 PROCEDURE — G0463 HOSPITAL OUTPT CLINIC VISIT: HCPCS

## 2021-01-28 NOTE — PROGRESS NOTES
Visit #:  4    Subjective:  Yohana Robertson is a 30 year old female who is seen in f/u up for:        Sprain of sacroiliac joint, initial encounter  Segmental and somatic dysfunction of sacral region.     Since last visit on 1/20/2021,  Yohana Robertson reports: Symptoms continue to show some signs of progress. Symptoms are worse later in the day but are better earlier on.  Has not been able to find SI/trochanteric belt but is still looking.  Continuing to work with physical therapy which she finds relief with.  Patient also reports that she quit her job and is in the process of looking for a new one    Recently was tested for HLA-B27 to confirm suspicious of ankylosing sponydlitis. This came back positive.    Area of chief complaint:  Lumbar :  Symptoms are graded at 4/10. The quality is described as achey, and pressure.       Objective:  The following was observed:    P: palpatory tendernessPSIS bilaterally:    A: static palpation demonstrates intersegmental asymmetry , pelvis  R: motion palpation notes restricted motion, Sacrum   T: No apparent spasms.  Taut and tender fibers along quadratus lumborum, gluteus medius bilaterally, lumbar paraspinals bilaterally    Segmental spinal dysfunction/restrictions found at:  :  Sacrum Base posterior listing.      Assessment: Patient is showing definite signs of progress with course of treatment.  Patient has been referred by primary physician Dr. Marcelo CAICEDO for rheumatologist evaluation and assessment regarding HLA-B 27+ test.  With this knowledge patient can expect to find benefit with cotreatment of physical therapy and chiropractic although symptoms will continue to be slow to progress.  At this time patient care is being transferred to Dr. Melany Paris due to my medical leave of absence.  At this time I do not believe that patient has reached MMI and would benefit from additional visits.    Diagnoses:      1. Sprain of sacroiliac joint, initial encounter    2.  Segmental and somatic dysfunction of sacral region        Patient's condition:  Patient had restrictions pre-manipulation, Patient symptoms are gradually improving and Symptoms come and go    Treatment effectiveness:  Post manipulation there is better intersegmental movement and Patient states that they feel much better post manipulation but they gradually tighten up over time      Procedures:  CMT:  01505 Chiropractic manipulative treatment 1-2 regions performed   Pelvis: Drop Table and SOT blocks, Sacrum , Prone    Modalities:  None performed this visit    Therapeutic procedures:  Deferred to physical therapy    Response to Treatment  Reduction in symptoms as reported by patient    Prognosis: Good    Progress towards Goals:  Patient will report improved pain by 50%. In progress              Patient will report able to stand with 50% improvement. In progress              Patient will report able to walk 50% improvement. In progress              Patient will demonstrate an improved ability to complete Activities of Daily Living   as shown by a reported 10-30% reduced score on neck and/or back index.               Patient will demonstrate improved ROM.    Recommendations:    Instructions:ice 20 minutes every other hour as needed and heat 15 minutes every other hour as needed    Follow-up:  Return to care in 1 week .

## 2021-01-29 ENCOUNTER — MYC MEDICAL ADVICE (OUTPATIENT)
Dept: FAMILY MEDICINE | Facility: OTHER | Age: 31
End: 2021-01-29

## 2021-02-02 ENCOUNTER — TELEPHONE (OUTPATIENT)
Dept: FAMILY MEDICINE | Facility: OTHER | Age: 31
End: 2021-02-02

## 2021-02-02 DIAGNOSIS — M46.1 SACROILIITIS (H): ICD-10-CM

## 2021-02-02 DIAGNOSIS — Z15.89 HLA B27 (HLA B27 POSITIVE): Primary | ICD-10-CM

## 2021-02-02 NOTE — TELEPHONE ENCOUNTER
Would you consider changing rheumatology referral to [facility] from [facility]    Please call to advise    Thank you

## 2021-02-03 ENCOUNTER — HOSPITAL ENCOUNTER (OUTPATIENT)
Dept: PHYSICAL THERAPY | Facility: OTHER | Age: 31
Setting detail: THERAPIES SERIES
End: 2021-02-03
Attending: PODIATRIST
Payer: COMMERCIAL

## 2021-02-03 PROCEDURE — 97110 THERAPEUTIC EXERCISES: CPT | Mod: GP | Performed by: PHYSICAL THERAPIST

## 2021-02-03 NOTE — PROGRESS NOTES
[facility]      OUTPATIENT PHYSICAL THERAPY  PLAN OF TREATMENT FOR OUTPATIENT REHABILITATION    Patient's Last Name, First Name, M.I.                YOB: 1990  Yohana Robertson                        Provider's Name  [facility] Medical Record No.  [id number]                               Onset Date: 10/10/2020   Start of Care Date: 10/28/2020   Type:     _X_PT   ___OT   ___SLP Medical Diagnosis: 1) acute right sided low back pain with sciatica    2) sacroiliac dysfunction                       PT Diagnosis: impaired mobiliey      _________________________________________________________________________________  Plan of Treatment:  Therapeutic Exercise, Manual therapy, Ultrasound, cold pack    Frequency/Duration: 2x/week, 8 weeks     Goals:  Goal Identifier pelvic alignment   Goal Description Patient will consistently present with neutral pelvic alignment for decreased pain with standing and walking.   Target Date 11/25/20   Date Met  02/03/21   Progress:  Goal met for PT - Improved walking and transfers.  Will continue to work with chiropractor for adjustments as needed.      Goal Identifier muscle spasm   Goal Description Patient will have no muslce spasms and be able to eliminate use of muscle relaxers with no increased pain rating for improved tolerance to activity.   Target Date 11/25/20   Date Met      Progress: variable - Improvement over the past week.      Goal Identifier strength   Goal Description Patient will demonstrate ability to complete all strengthening exercises with back pain 0-3/10 max for progression of lumbar stability.   Target Date 11/25/20   Date Met      Progress:  Ongoing - Progress made, tolerating gradual progression of pelvic stabilization exercises.      Goal Identifier walking   Goal Description Patient will demonstrate symmetrical gait  pattern, community level distances for ability to complete home and work tasks.   Target Date 12/23/20   Date Met      Progress:  Partially met - Patient demonstrates improved gait pattern with improved symmetry, however continues to walk and transfer in a slow guarded manner.      Goal Identifier strength   Goal Description Patient will be able to complete asymmetrical bending and rotational activity with no pain for ability to complete  and home care tasks.   Target Date 12/23/20   Date Met      Progress:  Not met          Certification date from 1/25/21 to 3/25/21.    Tayla Cyr, PT          I CERTIFY THE NEED FOR THESE SERVICES FURNISHED UNDER        THIS PLAN OF TREATMENT AND WHILE UNDER MY CARE     (Physician co-signature of this document indicates review and certification of the therapy plan).                Referring Provider: Dr. Robertson

## 2021-02-03 NOTE — PROGRESS NOTES
"Outpatient Physical Therapy Progress Note     Patient: Yohana Robertson  : 1990    Beginning/End Dates of Reporting Period:  10/28/2020 to 2/3/2021  New Certification Dates:  21 to 3/25/21    Referring Provider: Dr. Robertson    Therapy Diagnosis: 1) acute right sided low back pain with sciatica  2) sacroiliac dystunction         Client Self Report: Patient was last seen 21 (2 wk).      Patient reports improvement in low back pain (currently rates pain 2-3/10).  Quit job last week, and thinks this has helped.  Does plan on looking for a different job with breaks and more in line with what her back can tolerate.  Has been doing some exercises \"here and there\", but also states she has been taking it easy.  Does LTR, and abdominal brace with LE addition exercises.      Will continue to work with chiropractor as well noting continued improvement with treatment.          Objective Measurements:  NA    Goals:  Goal Identifier pelvic alignment   Goal Description Patient will consistently present with neutral pelvic alignment for decreased pain with standing and walking.   Target Date 20   Date Met  21   Progress:  Goal met for PT - Improved walking and transfers.  Will continue to work with chiropractor for adjustments as needed.     Goal Identifier muscle spasm   Goal Description Patient will have no muslce spasms and be able to eliminate use of muscle relaxers with no increased pain rating for improved tolerance to activity.   Target Date 20   Date Met      Progress: variable - Improvement over the past week.     Goal Identifier strength   Goal Description Patient will demonstrate ability to complete all strengthening exercises with back pain 0-3/10 max for progression of lumbar stability.   Target Date 20   Date Met      Progress:  Ongoing - Progress made, tolerating gradual progression of pelvic stabilization exercises.     Goal Identifier walking   Goal Description Patient " "will demonstrate symmetrical gait pattern, community level distances for ability to complete home and work tasks.   Target Date 12/23/20   Date Met      Progress:  Partially met - Patient demonstrates improved gait pattern with improved symmetry, however continues to walk and transfer in a slow guarded manner.     Goal Identifier strength   Goal Description Patient will be able to complete asymmetrical bending and rotational activity with no pain for ability to complete  and home care tasks.   Target Date 12/23/20   Date Met      Progress:  Not met       Progress Toward Goals:   Patient initially seen in therapy requiring muscle energy techniques to improve pelvic alignment and progress pelvic stability exercises.  Patient experienced acute exacerbations of back pain and would have to essentially \"start over\" with rehabilitation.  Slower progression after most recent acute episode.    Patient currently demonstrating tolerance to gradual progression of pelvic stabilization exercises.  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        Plan:  Continue therapy per current plan of care.    Discharge:  No  "

## 2021-02-05 ENCOUNTER — HOSPITAL ENCOUNTER (OUTPATIENT)
Dept: PHYSICAL THERAPY | Facility: OTHER | Age: 31
Setting detail: THERAPIES SERIES
End: 2021-02-05
Attending: PODIATRIST
Payer: COMMERCIAL

## 2021-02-05 PROCEDURE — 97110 THERAPEUTIC EXERCISES: CPT | Mod: GP | Performed by: PHYSICAL THERAPIST

## 2021-02-08 ENCOUNTER — THERAPY VISIT (OUTPATIENT)
Dept: CHIROPRACTIC MEDICINE | Facility: OTHER | Age: 31
End: 2021-02-08
Attending: CHIROPRACTOR
Payer: COMMERCIAL

## 2021-02-08 DIAGNOSIS — S33.6XXA SPRAIN OF SACROILIAC JOINT, INITIAL ENCOUNTER: Primary | ICD-10-CM

## 2021-02-08 DIAGNOSIS — M99.04 SEGMENTAL AND SOMATIC DYSFUNCTION OF SACRAL REGION: ICD-10-CM

## 2021-02-08 DIAGNOSIS — M54.50 BILATERAL LOW BACK PAIN WITHOUT SCIATICA, UNSPECIFIED CHRONICITY: ICD-10-CM

## 2021-02-08 PROCEDURE — 98940 CHIROPRACT MANJ 1-2 REGIONS: CPT | Mod: AT | Performed by: CHIROPRACTOR

## 2021-02-08 PROCEDURE — G0463 HOSPITAL OUTPT CLINIC VISIT: HCPCS

## 2021-02-08 NOTE — PATIENT INSTRUCTIONS
Patient will demonstrate improved ROM.  2/8/2021 Goal in progress, reassess next visit.    Recommendations:    Instructions:heat 15 minutes every other hour as needed    Follow-up:  Return to care in 1-2 weeks and consider discharge.

## 2021-02-08 NOTE — PROGRESS NOTES
2/8/2021   Visit #:  5/6-8    Subjective:  Yohana Robertson is a 30 year old female who is seen in f/u up for:      Sprain of sacroiliac joint, initial encounter  Segmental and somatic dysfunction of sacral region  Bilateral low back pain without sciatica, unspecified chronicity.     Since last visit on 1/28/2021 patient care is transferred due to treating chiropractor medical leave of absence.  Yohana Robertson reports: Significantly improved as reported on Oswestry score of 13% compared to 1/13/2021 initial 67%.    Continuing to work with physical therapy, using SI belt and using heat which she finds relief with.     Recently was tested for HLA-B27 to confirm suspicious of ankylosing sponydlitis. This came back positive. She is waiting to hear back to schedule with Rheumatology.    Area of chief complaint:  Lumbar :  Symptoms are graded at 2/10. The quality is described as achey pressure, and  intermittent.       Oswestry (CAT) Questionnaire    OSWESTRY DISABILITY INDEX 2/8/2021   Count 9   Sum 6   Oswestry Score (%) 13.33   Some recent data might be hidden          Objective:  The following was observed:    P: palpatory tendernessPSIS bilaterally:    A: static palpation demonstrates intersegmental asymmetry , pelvis  R: motion palpation notes restricted motion, Sacrum   T: No apparent spasms.  Taut and tender fibers along quadratus lumborum, gluteus medius bilaterally, lumbar paraspinals bilaterally    Segmental spinal dysfunction/restrictions found at:  :  Sacrum Base posterior listing.      Assessment: Patient is showing definite signs of progress with course of treatment.  Patient has been referred by primary physician Dr. Marcelo CAICEDO for rheumatologist evaluation and assessment regarding HLA-B 27+ test.  With benefit of cotreatment of physical therapy, Patient nearing MMI and will return in 1-2 weeks to evaluate if she can benefit from additional visits.    Diagnoses:      1. Sprain of sacroiliac joint,  "initial encounter    2. Segmental and somatic dysfunction of sacral region    3. Bilateral low back pain without sciatica, unspecified chronicity        Patient's condition:  Patient had restrictions pre-manipulation, Patient symptoms are gradually improving.    Treatment effectiveness: reports no pain at \"0/10 and warmth only\"    Procedures:  CMT:  02831 Chiropractic manipulative treatment 1-2 regions performed   Pelvis: Drop Table and SOT blocks, Sacrum , Prone    Modalities:  None performed this visit    Therapeutic procedures:  Deferred to physical therapy    Response to Treatment  Reduction in symptoms as reported by patient    Prognosis: Good    Progress towards Goals:  Patient will report improved pain by 50%. 2/8/2021 Goal met VAS 2/10  from initial 8-10/10.               Patient will report able to stand with 50% improvement. 2/8/2021 Goal met               Patient will report able to walk 50% improvement. 2/8/2021 Goal met               Patient will demonstrate an improved ability to complete Activities of Daily Living   as shown by a reported 10-30% reduced score on neck and/or back index.  2/8/2021 Goal met               Patient will demonstrate improved ROM.  2/8/2021 Goal in progress, reassess next visit.    Recommendations:    Instructions:heat 15 minutes every other hour as needed    Follow-up:  Return to care in 1-2 weeks and consider discharge.         "

## 2021-02-10 ENCOUNTER — HOSPITAL ENCOUNTER (OUTPATIENT)
Dept: PHYSICAL THERAPY | Facility: OTHER | Age: 31
Setting detail: THERAPIES SERIES
End: 2021-02-10
Attending: PODIATRIST
Payer: COMMERCIAL

## 2021-02-10 PROCEDURE — 97110 THERAPEUTIC EXERCISES: CPT | Mod: GP | Performed by: PHYSICAL THERAPIST

## 2021-02-19 ENCOUNTER — HOSPITAL ENCOUNTER (OUTPATIENT)
Dept: PHYSICAL THERAPY | Facility: OTHER | Age: 31
Setting detail: THERAPIES SERIES
End: 2021-02-19
Attending: PODIATRIST
Payer: COMMERCIAL

## 2021-02-19 PROCEDURE — 97110 THERAPEUTIC EXERCISES: CPT | Mod: GP | Performed by: PHYSICAL THERAPIST

## 2021-02-24 ENCOUNTER — HOSPITAL ENCOUNTER (OUTPATIENT)
Dept: PHYSICAL THERAPY | Facility: OTHER | Age: 31
Setting detail: THERAPIES SERIES
End: 2021-02-24
Attending: PODIATRIST
Payer: COMMERCIAL

## 2021-02-24 PROCEDURE — 97110 THERAPEUTIC EXERCISES: CPT | Mod: GP | Performed by: PHYSICAL THERAPIST

## 2021-02-26 ENCOUNTER — HOSPITAL ENCOUNTER (OUTPATIENT)
Dept: PHYSICAL THERAPY | Facility: OTHER | Age: 31
Setting detail: THERAPIES SERIES
End: 2021-02-26
Attending: PODIATRIST
Payer: COMMERCIAL

## 2021-02-26 PROCEDURE — 97110 THERAPEUTIC EXERCISES: CPT | Mod: GP | Performed by: PHYSICAL THERAPIST

## 2021-03-08 ENCOUNTER — HOSPITAL ENCOUNTER (OUTPATIENT)
Dept: PHYSICAL THERAPY | Facility: OTHER | Age: 31
Setting detail: THERAPIES SERIES
End: 2021-03-08
Attending: PODIATRIST
Payer: COMMERCIAL

## 2021-03-08 PROCEDURE — 97110 THERAPEUTIC EXERCISES: CPT | Mod: GP | Performed by: PHYSICAL THERAPIST

## 2021-03-10 ENCOUNTER — HOSPITAL ENCOUNTER (OUTPATIENT)
Dept: PHYSICAL THERAPY | Facility: OTHER | Age: 31
Setting detail: THERAPIES SERIES
End: 2021-03-10
Attending: PODIATRIST
Payer: COMMERCIAL

## 2021-03-10 PROCEDURE — 97110 THERAPEUTIC EXERCISES: CPT | Mod: GP | Performed by: PHYSICAL THERAPIST

## 2021-03-17 ENCOUNTER — HOSPITAL ENCOUNTER (OUTPATIENT)
Dept: PHYSICAL THERAPY | Facility: OTHER | Age: 31
Setting detail: THERAPIES SERIES
End: 2021-03-17
Attending: PODIATRIST
Payer: COMMERCIAL

## 2021-03-17 PROCEDURE — 97110 THERAPEUTIC EXERCISES: CPT | Mod: GP | Performed by: PHYSICAL THERAPIST

## 2021-03-19 ENCOUNTER — HOSPITAL ENCOUNTER (OUTPATIENT)
Dept: PHYSICAL THERAPY | Facility: OTHER | Age: 31
Setting detail: THERAPIES SERIES
End: 2021-03-19
Attending: PODIATRIST
Payer: COMMERCIAL

## 2021-03-19 PROCEDURE — 97110 THERAPEUTIC EXERCISES: CPT | Mod: GP | Performed by: PHYSICAL THERAPIST

## 2021-03-24 ENCOUNTER — TELEPHONE (OUTPATIENT)
Dept: FAMILY MEDICINE | Facility: OTHER | Age: 31
End: 2021-03-24

## 2021-03-24 ENCOUNTER — HOSPITAL ENCOUNTER (OUTPATIENT)
Dept: PHYSICAL THERAPY | Facility: OTHER | Age: 31
Setting detail: THERAPIES SERIES
End: 2021-03-24
Attending: PODIATRIST
Payer: COMMERCIAL

## 2021-03-24 DIAGNOSIS — M54.50 CHRONIC RIGHT-SIDED LOW BACK PAIN WITHOUT SCIATICA: Primary | ICD-10-CM

## 2021-03-24 DIAGNOSIS — G89.29 CHRONIC RIGHT-SIDED LOW BACK PAIN WITHOUT SCIATICA: Primary | ICD-10-CM

## 2021-03-24 PROCEDURE — 97110 THERAPEUTIC EXERCISES: CPT | Mod: GP | Performed by: PHYSICAL THERAPIST

## 2021-03-24 NOTE — PROGRESS NOTES
Outpatient Physical Therapy Discharge Note     Patient: Yohana Robertson  : 1990    Beginning/End Dates of Reporting Period:  10/28/20 to 3/24/2021    Referring Provider: Dr. Robertson    Therapy Diagnosis: 1) acute right sided low back pain with sciatica  2) sacroiliac dystunction           Client Self Report: Patient states she is stiff and locked up.  Not sure if related to the weather changing.  Hasn't gotten to do stretches last night, but did this morning.  Pain had been closer to 2/10 lately.  Patient is interested in joining the spine rehab group.      Objective Measurements:  Objective Measure: pain rating (low back)  Details: 6/10 today    Goals:  Goal Identifier pelvic alignment   Goal Description Patient will consistently present with neutral pelvic alignment for decreased pain with standing and walking.   Target Date 20   Date Met  21   Progress:  Goal met     Goal Identifier muscle spasm   Goal Description Patient will have no muslce spasms and be able to eliminate use of muscle relaxers with no increased pain rating for improved tolerance to activity.   Target Date 21   Date Met  21   Progress:  Goal met     Goal Identifier strength   Goal Description Patient will demonstrate ability to complete all strengthening exercises with back pain 0-3/10 max for progression of lumbar stability.   Target Date 21   Date Met      Progress:  Progress made     Goal Identifier walking   Goal Description Patient will demonstrate symmetrical gait pattern, community level distances for ability to complete home and work tasks.   Target Date 21   Date Met  21   Progress:  Goal met     Goal Identifier strength   Goal Description Patient will be able to complete asymmetrical bending and rotational activity with no pain for ability to complete  and home care tasks.   Target Date 21   Date Met      Progress:  Progress made       Progress Toward Goals:    Patient has had a variable course of therapy with improvement followed by exacerbation of low back pain on several occasions.  Has been able to continue with HEP throughout episode of care and has been able to resume use of [employer] equipment.  Patient feels able to transition to Spine Program at this time.            Plan:  Discharge from therapy.    Discharge:    Reason for Discharge: Most goals met and progress made toward 2 unmet goals.    Equipment Issued: none    Discharge Plan: Patient to continue home program and will transition to Spine Program.

## 2021-03-24 NOTE — TELEPHONE ENCOUNTER
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  It is my understanding she will need a referral to see Dr. Cazares, and then can start the program after that visit.  Thank you!    Tayla Cyr, PT on 3/24/2021 at 10:37 AM

## 2021-03-29 ENCOUNTER — OFFICE VISIT (OUTPATIENT)
Dept: FAMILY MEDICINE | Facility: OTHER | Age: 31
End: 2021-03-29
Attending: FAMILY MEDICINE

## 2021-03-29 VITALS
BODY MASS INDEX: 27.06 KG/M2 | HEART RATE: 72 BPM | RESPIRATION RATE: 15 BRPM | TEMPERATURE: 98.4 F | WEIGHT: 169 LBS | SYSTOLIC BLOOD PRESSURE: 112 MMHG | OXYGEN SATURATION: 98 % | DIASTOLIC BLOOD PRESSURE: 70 MMHG

## 2021-03-29 DIAGNOSIS — G89.29 CHRONIC RIGHT-SIDED LOW BACK PAIN WITHOUT SCIATICA: ICD-10-CM

## 2021-03-29 DIAGNOSIS — M54.50 CHRONIC RIGHT-SIDED LOW BACK PAIN WITHOUT SCIATICA: ICD-10-CM

## 2021-03-29 PROCEDURE — 99207 PR BACK PROGRAM: CPT | Performed by: CHIROPRACTOR

## 2021-03-29 ASSESSMENT — PAIN SCALES - GENERAL: PAINLEVEL: SEVERE PAIN (6)

## 2021-03-29 NOTE — PROGRESS NOTES
Spine Therapy Program Consultation Report      Patient referred by: Dr. Oscar Robertson    Condition: Chronic LBP, HLA-B27 positive    Review of records performed: YES    Candidate for program: YES    Specific Instructions:     X-rays performed 1/4/2021 indicate left posterior rotation of the sacrum.  There is decreased disc space noted at L5/S1.    Patient has an MRI scheduled tomorrow as ordered by Dr. Cristo Benitez.  I suspect findings of small disc protrusion at L5/S1 as well as facet arthropathy.  Though I believe the Spine Program will be beneficial in addressing these concerns, patient may benefit from additional therapies pending MRI results, including but not limited to injection.    She has a virtual visit scheduled with Dr. Benitez on April 7th which will certainly address the results of her MRI.  I, too, will review the results and opine on further treatment options.  For now, I will initiate her entering the Spine Therapy Program that I believe will be effective in addressing her concerns.        Patient will return in four weeks for reassessment upon initiating our Spine Therapy Program.    Thank you for your referral,        Yandel Molina DC, GERTRUDIS  Director - Occupational [place] [address]  Phone [phone]  Fax [phone]    Disclaimer:  This note consists of words and symbols derived from keyboarding, dictation, or using voice recognition software. As a result, there may be errors in the script that have gone undetected. Please consider this when interpreting information found in this note.    10:46 AM 3/29/2021

## 2021-03-29 NOTE — NURSING NOTE
Yohana Robertson is a 30 year old female presenting today to be evaluated for the Spine Program.  Diagnosis: Chronic right sided low back pain with sciatica      Medication Reconciliation: complete    Review Of Systems  Skin: negative  Eyes: negative  Ears/Nose/Throat: negative  Respiratory: No shortness of breath, dyspnea on exertion, cough, or hemoptysis  Cardiovascular: negative  Gastrointestinal: negative  Genitourinary: negative  Musculoskeletal: positive for back pain  Neurologic: positive for numbness or tingling of feet  Psychiatric: negative  Hematologic/Lymphatic/Immunologic: negative  Endocrine: negative    Noemi Ch LPN  3/29/2021 9:56 AM

## 2021-03-30 ENCOUNTER — HOSPITAL ENCOUNTER (OUTPATIENT)
Dept: MRI IMAGING | Facility: OTHER | Age: 31
End: 2021-03-30
Attending: INTERNAL MEDICINE
Payer: COMMERCIAL

## 2021-03-30 DIAGNOSIS — M54.41 CHRONIC BILATERAL LOW BACK PAIN WITH BILATERAL SCIATICA: ICD-10-CM

## 2021-03-30 DIAGNOSIS — G89.29 CHRONIC BILATERAL LOW BACK PAIN WITH BILATERAL SCIATICA: ICD-10-CM

## 2021-03-30 DIAGNOSIS — M54.42 CHRONIC BILATERAL LOW BACK PAIN WITH BILATERAL SCIATICA: ICD-10-CM

## 2021-03-30 DIAGNOSIS — M53.3 SACROILIAC PAIN: ICD-10-CM

## 2021-03-30 DIAGNOSIS — M54.16 LUMBAR RADICULOPATHY: ICD-10-CM

## 2021-03-30 DIAGNOSIS — Z15.89 HLA-B27 POSITIVE: ICD-10-CM

## 2021-03-30 PROCEDURE — 72197 MRI PELVIS W/O & W/DYE: CPT

## 2021-03-30 PROCEDURE — A9575 INJ GADOTERATE MEGLUMI 0.1ML: HCPCS | Performed by: RADIOLOGY

## 2021-03-30 PROCEDURE — 72148 MRI LUMBAR SPINE W/O DYE: CPT

## 2021-03-30 PROCEDURE — 255N000002 HC RX 255 OP 636: Performed by: RADIOLOGY

## 2021-03-30 RX ORDER — GADOTERATE MEGLUMINE 376.9 MG/ML
15 INJECTION INTRAVENOUS ONCE
Status: COMPLETED | OUTPATIENT
Start: 2021-03-30 | End: 2021-03-30

## 2021-03-30 RX ADMIN — GADOTERATE MEGLUMINE 15 ML: 376.9 INJECTION INTRAVENOUS at 16:41

## 2021-04-14 ENCOUNTER — TELEPHONE (OUTPATIENT)
Dept: FAMILY MEDICINE | Facility: OTHER | Age: 31
End: 2021-04-14

## 2021-04-14 NOTE — TELEPHONE ENCOUNTER
Please contact patient: MRI confirmed my suspicion of disc and facet involvement.  Recommend proceed with Spine Program.  May also consider injection to the disc protrusion with Dr. Benitez.

## 2021-04-14 NOTE — TELEPHONE ENCOUNTER
Patient had her virtual follow up visit with Dr. Benitez on 4/7/21 and received her results, but wanted to know what Yandel Molina DC, CICE's opinion was.  Noemi Ch LPN  4/14/2021 8:57 AM

## 2021-04-25 ENCOUNTER — HEALTH MAINTENANCE LETTER (OUTPATIENT)
Age: 31
End: 2021-04-25

## 2021-04-27 ENCOUNTER — HOSPITAL ENCOUNTER (OUTPATIENT)
Dept: PHYSICAL THERAPY | Facility: OTHER | Age: 31
Setting detail: THERAPIES SERIES
End: 2021-04-27
Attending: CHIROPRACTOR
Payer: COMMERCIAL

## 2021-04-27 DIAGNOSIS — G89.29 CHRONIC RIGHT-SIDED LOW BACK PAIN WITHOUT SCIATICA: ICD-10-CM

## 2021-04-27 DIAGNOSIS — M54.50 CHRONIC RIGHT-SIDED LOW BACK PAIN WITHOUT SCIATICA: ICD-10-CM

## 2021-04-27 PROCEDURE — 97162 PT EVAL MOD COMPLEX 30 MIN: CPT | Mod: GP

## 2021-04-27 PROCEDURE — 97140 MANUAL THERAPY 1/> REGIONS: CPT | Mod: GP

## 2021-04-27 PROCEDURE — 97110 THERAPEUTIC EXERCISES: CPT | Mod: GP
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
## 2021-05-04 ENCOUNTER — HOSPITAL ENCOUNTER (OUTPATIENT)
Dept: PHYSICAL THERAPY | Facility: OTHER | Age: 31
Setting detail: THERAPIES SERIES
End: 2021-05-04
Attending: CHIROPRACTOR
Payer: COMMERCIAL

## 2021-05-04 PROCEDURE — 97140 MANUAL THERAPY 1/> REGIONS: CPT | Mod: GP

## 2021-05-04 PROCEDURE — 97110 THERAPEUTIC EXERCISES: CPT | Mod: GP

## 2021-05-18 ENCOUNTER — HOSPITAL ENCOUNTER (OUTPATIENT)
Dept: PHYSICAL THERAPY | Facility: OTHER | Age: 31
Setting detail: THERAPIES SERIES
End: 2021-05-18
Attending: FAMILY MEDICINE
Payer: COMMERCIAL

## 2021-05-18 PROCEDURE — 97110 THERAPEUTIC EXERCISES: CPT | Mod: GP

## 2021-05-18 PROCEDURE — 97140 MANUAL THERAPY 1/> REGIONS: CPT | Mod: GP

## 2021-05-21 ENCOUNTER — HOSPITAL ENCOUNTER (OUTPATIENT)
Dept: PHYSICAL THERAPY | Facility: OTHER | Age: 31
Setting detail: THERAPIES SERIES
End: 2021-05-21
Attending: CHIROPRACTOR
Payer: COMMERCIAL

## 2021-05-21 PROCEDURE — 97110 THERAPEUTIC EXERCISES: CPT | Mod: GP

## 2021-05-24 ENCOUNTER — HOSPITAL ENCOUNTER (OUTPATIENT)
Dept: PHYSICAL THERAPY | Facility: OTHER | Age: 31
Setting detail: THERAPIES SERIES
End: 2021-05-24
Attending: CHIROPRACTOR
Payer: COMMERCIAL

## 2021-05-24 PROCEDURE — 97110 THERAPEUTIC EXERCISES: CPT | Mod: GP

## 2021-05-26 ENCOUNTER — HOSPITAL ENCOUNTER (OUTPATIENT)
Dept: PHYSICAL THERAPY | Facility: OTHER | Age: 31
Setting detail: THERAPIES SERIES
End: 2021-05-26
Attending: CHIROPRACTOR
Payer: COMMERCIAL

## 2021-05-26 PROCEDURE — 97110 THERAPEUTIC EXERCISES: CPT | Mod: GP

## 2021-06-01 ENCOUNTER — HOSPITAL ENCOUNTER (OUTPATIENT)
Dept: PHYSICAL THERAPY | Facility: OTHER | Age: 31
Setting detail: THERAPIES SERIES
End: 2021-06-01
Attending: CHIROPRACTOR
Payer: COMMERCIAL

## 2021-06-01 PROCEDURE — 97110 THERAPEUTIC EXERCISES: CPT | Mod: GP

## 2021-06-03 ENCOUNTER — HOSPITAL ENCOUNTER (OUTPATIENT)
Dept: PHYSICAL THERAPY | Facility: OTHER | Age: 31
Setting detail: THERAPIES SERIES
End: 2021-06-03
Attending: CHIROPRACTOR
Payer: COMMERCIAL

## 2021-06-03 PROCEDURE — 97110 THERAPEUTIC EXERCISES: CPT | Mod: GP

## 2021-06-07 ENCOUNTER — HOSPITAL ENCOUNTER (OUTPATIENT)
Dept: PHYSICAL THERAPY | Facility: OTHER | Age: 31
Setting detail: THERAPIES SERIES
End: 2021-06-07
Attending: CHIROPRACTOR
Payer: COMMERCIAL

## 2021-06-07 PROCEDURE — 97110 THERAPEUTIC EXERCISES: CPT | Mod: GP

## 2021-06-10 ENCOUNTER — HOSPITAL ENCOUNTER (OUTPATIENT)
Dept: PHYSICAL THERAPY | Facility: OTHER | Age: 31
Setting detail: THERAPIES SERIES
End: 2021-06-10
Attending: CHIROPRACTOR
Payer: COMMERCIAL

## 2021-06-10 PROCEDURE — 97110 THERAPEUTIC EXERCISES: CPT | Mod: GP

## 2021-06-17 ENCOUNTER — HOSPITAL ENCOUNTER (OUTPATIENT)
Dept: PHYSICAL THERAPY | Facility: OTHER | Age: 31
Setting detail: THERAPIES SERIES
End: 2021-06-17
Attending: CHIROPRACTOR
Payer: COMMERCIAL

## 2021-06-17 PROCEDURE — 97110 THERAPEUTIC EXERCISES: CPT | Mod: GP

## 2021-06-17 NOTE — PROGRESS NOTES
Outpatient Physical Therapy Progress Note     Patient: Yohana Robertson  : 1990    Beginning/End Dates of Reporting Period:  2021 to 2021    Referring Provider: Dr. Yandel Molina DC (Primary Marcelo, Oscar BUCK MD)    Therapy Diagnosis: Low back and R hip pain     Client Self Report: Patient reports that she is dealing with a migraine for yesterday that is slightly improved but still painful today. Overall she feels that she is improving. Has noticed changes in her strength and mobliity. Still deals with pain from day to day but overall thinks this is also getting better.     Objective Measurements:  Objective Measure: Pain level   Details: 5/10    Objective Measure: Lumbar ROM  Details: Patient is able to reach down and touch her toes without real tightness. Still stiff and some soreness going into extension    Objective Measure: Core strength  Details: Improved lower abdominals, glut med, and glut max       Goals:  Goal Identifier Pain   Goal Description Pt will have minimal to no pain   Target Date 21   Date Met      Progress: Still having pain in the 4-5/10 range     Goal Identifier Sitting   Goal Description Pt will tolerate sitting 45-60 minutes without increasing pain > 0-2/10   Target Date 21   Date Met      Progress: Still having pain in the 4-5/10 range     Goal Identifier Sleeping   Goal Description Pt will tolerate sleeping 6 hours without waking due to pain   Target Date 21   Date Met      Progress: Not quite at 6 hours. She is improving however not quite met this time frame     Goal Identifier Traveling   Goal Description Pt will tolerate driving or sitting in car for 2 hours without increasing pain > 0-2/10   Target Date 21   Date Met      Progress: Still having pain in the 4-5/10 range     Progress Toward Goals:   Progress this reporting period: Patient is progressing well towards her goals. She has reported that she hasn't necessarily seen significant  improvements in her overall pain, however she is able to do more and is getting stronger without flare ups or increases in her discomfort. She will continue to benefit from the spine program and physical therapy services in order to continue improvement with her pain, strength, mobility, and endurance.     Plan:  Continue therapy per current plan of care.    Discharge:  No

## 2021-06-21 ENCOUNTER — HOSPITAL ENCOUNTER (OUTPATIENT)
Dept: PHYSICAL THERAPY | Facility: OTHER | Age: 31
Setting detail: THERAPIES SERIES
End: 2021-06-21
Attending: CHIROPRACTOR
Payer: COMMERCIAL

## 2021-06-21 PROCEDURE — 97110 THERAPEUTIC EXERCISES: CPT | Mod: GP

## 2021-06-22 NOTE — PROGRESS NOTES
[facility]          OUTPATIENT PHYSICAL THERAPY ORTHOPEDIC EVALUATION  PLAN OF TREATMENT FOR OUTPATIENT REHABILITATION  (COMPLETE FOR INITIAL CLAIMS ONLY)  Patient's Last Name, First Name, M.I.  YOB: 1990  Yohana Robertson    Provider s Name:  [facility]   Medical Record No.  [id number]   Start of Care Date:  04/27/21   Onset Date:  03/29/21   Type:     _X__PT   ___OT   ___SLP Medical Diagnosis:        PT Diagnosis:  Low back and R hip pain   Visits from SOC:  1      _________________________________________________________________________________  Plan of Treatment/Functional Goals:  joint mobilization, manual therapy, neuromuscular re-education, ROM, strengthening, stretching           Goals  Goal Identifier: Pain  Goal Description: Pt will have minimal to no pain  Target Date: 07/20/21    Goal Identifier: sitting  Goal Description: Pt will tolerate sitting 45-60 minutes without increasing pain > 0-2/10  Target Date: 07/20/21    Goal Identifier: Sleeping  Goal Description: Pt will tolerate sleeping 6 hours without waking due to pain  Target Date: 07/20/21    Goal Identifier: Traveling  Goal Description: Pt will tolerate driving or sitting in car for 2 hours without increasing pain > 0-2/10  Target Date: 07/20/21                                                Therapy Frequency:  2 times/Week  Predicted Duration of Therapy Intervention:  12 weeks    Ronna Edwards, GARETT                 I CERTIFY THE NEED FOR THESE SERVICES FURNISHED UNDER        THIS PLAN OF TREATMENT AND WHILE UNDER MY CARE     (Physician co-signature of this document indicates review and certification of the therapy plan).                       Certification Date From:      Certification Date To:       Referring Provider:  Dr. Oscar Robertson MD    Initial Assessment        See Epic Evaluation Start of Care Date: 04/27/21

## 2021-06-28 ENCOUNTER — HOSPITAL ENCOUNTER (OUTPATIENT)
Dept: PHYSICAL THERAPY | Facility: OTHER | Age: 31
Setting detail: THERAPIES SERIES
End: 2021-06-28
Attending: CHIROPRACTOR
Payer: COMMERCIAL

## 2021-06-28 PROCEDURE — 97110 THERAPEUTIC EXERCISES: CPT | Mod: GP

## 2021-06-29 ENCOUNTER — OFFICE VISIT (OUTPATIENT)
Dept: FAMILY MEDICINE | Facility: OTHER | Age: 31
End: 2021-06-29
Attending: CHIROPRACTOR

## 2021-06-29 VITALS
DIASTOLIC BLOOD PRESSURE: 74 MMHG | HEART RATE: 84 BPM | RESPIRATION RATE: 18 BRPM | WEIGHT: 161 LBS | BODY MASS INDEX: 26.82 KG/M2 | OXYGEN SATURATION: 97 % | TEMPERATURE: 99.2 F | SYSTOLIC BLOOD PRESSURE: 112 MMHG | HEIGHT: 65 IN

## 2021-06-29 DIAGNOSIS — G89.29 CHRONIC RIGHT-SIDED LOW BACK PAIN WITHOUT SCIATICA: Primary | ICD-10-CM

## 2021-06-29 DIAGNOSIS — M54.50 CHRONIC RIGHT-SIDED LOW BACK PAIN WITHOUT SCIATICA: Primary | ICD-10-CM

## 2021-06-29 PROCEDURE — 99207 PR BACK PROGRAM: CPT | Performed by: CHIROPRACTOR

## 2021-06-29 RX ORDER — SULFASALAZINE 500 MG/1
TABLET ORAL
COMMUNITY
Start: 2021-06-17

## 2021-06-29 ASSESSMENT — PAIN SCALES - GENERAL: PAINLEVEL: MODERATE PAIN (4)

## 2021-06-29 ASSESSMENT — MIFFLIN-ST. JEOR: SCORE: 1446.17

## 2021-06-29 NOTE — NURSING NOTE
Patient presenting for follow up on the spine program.  Medication Reconciliation: complete    Noemi Ch LPN  6/29/2021 2:31 PM

## 2021-07-01 ENCOUNTER — HOSPITAL ENCOUNTER (OUTPATIENT)
Dept: PHYSICAL THERAPY | Facility: OTHER | Age: 31
Setting detail: THERAPIES SERIES
End: 2021-07-01
Attending: CHIROPRACTOR
Payer: COMMERCIAL

## 2021-07-01 PROCEDURE — 97110 THERAPEUTIC EXERCISES: CPT | Mod: GP

## 2021-07-08 ENCOUNTER — HOSPITAL ENCOUNTER (OUTPATIENT)
Dept: PHYSICAL THERAPY | Facility: OTHER | Age: 31
Setting detail: THERAPIES SERIES
End: 2021-07-08
Attending: CHIROPRACTOR
Payer: COMMERCIAL

## 2021-07-08 PROCEDURE — 97110 THERAPEUTIC EXERCISES: CPT | Mod: GP

## 2021-07-12 ENCOUNTER — HOSPITAL ENCOUNTER (OUTPATIENT)
Dept: PHYSICAL THERAPY | Facility: OTHER | Age: 31
Setting detail: THERAPIES SERIES
End: 2021-07-12
Attending: CHIROPRACTOR
Payer: COMMERCIAL

## 2021-07-12 PROCEDURE — 97110 THERAPEUTIC EXERCISES: CPT | Mod: GP

## 2021-07-15 ENCOUNTER — HOSPITAL ENCOUNTER (OUTPATIENT)
Dept: PHYSICAL THERAPY | Facility: OTHER | Age: 31
Setting detail: THERAPIES SERIES
End: 2021-07-15
Attending: CHIROPRACTOR
Payer: COMMERCIAL

## 2021-07-15 PROCEDURE — 97110 THERAPEUTIC EXERCISES: CPT | Mod: GP

## 2021-07-19 ENCOUNTER — HOSPITAL ENCOUNTER (OUTPATIENT)
Dept: PHYSICAL THERAPY | Facility: OTHER | Age: 31
Setting detail: THERAPIES SERIES
End: 2021-07-19
Attending: CHIROPRACTOR
Payer: COMMERCIAL

## 2021-07-19 PROCEDURE — 97110 THERAPEUTIC EXERCISES: CPT | Mod: GP

## 2021-07-26 ENCOUNTER — HOSPITAL ENCOUNTER (OUTPATIENT)
Dept: PHYSICAL THERAPY | Facility: OTHER | Age: 31
Setting detail: THERAPIES SERIES
End: 2021-07-26
Attending: CHIROPRACTOR
Payer: COMMERCIAL

## 2021-07-26 PROCEDURE — 97110 THERAPEUTIC EXERCISES: CPT | Mod: GP

## 2021-07-26 NOTE — PROGRESS NOTES
Outpatient Physical Therapy Discharge Note     Patient: Yohana Robertson  : 1990    Beginning/End Dates of Reporting Period:  2021 to 2021    Referring Provider: Dr. Yandel Molina DC (Primary Marcelo, Oscar BUCK MD)     Therapy Diagnosis: Low back and R hip pain     Client Self Report: Today is patient's last visit. She reports that she feels comfortable with discharge at this time. She has completed 8 weeks of the spine program and feels better. She reports that she feels less pain, stronger, and she can do more activities and activities longer without flaring up her back pain. She has plans to continue with her stretching/core work at home and get a membership at the local [employer].     Objective Measurements:  Objective Measure: Pain level   Details: 4/10  Objective Measure: Lumbar ROM  Details: Able to reach the floor without much discomfort  Objective Measure: SLR  Details: R: 95; L: 90 degrees  Objective Measure: Oswestry:   Details: 24.44%  Objective Measure: Flexibility  Details:  L = R iliopsoas and quadriceps in Supine Milo postion. More discomfort on left side  Objective Measure: Core strength  Details: Improved Lower abdominals, glut med, and glut max     Outcome Measures (most recent score):    Oswestry Score (%): 24.44 %    Goals:  Goal Identifier Pain   Goal Description Pt will have minimal to no pain   Target Date 21   Date Met      Progress (detail required for progress note): Rates her pain at 4/10 and states that it has been staying right around those levels.      Goal Identifier Sitting   Goal Description Pt will tolerate sitting 45-60 minutes without increasing pain > 0-2/10   Target Date 21   Date Met  21   Progress (detail required for progress note):     Goal Identifier Sleeping   Goal Description Pt will tolerate sleeping 6 hours without waking due to pain   Target Date 21   Date Met      Progress (detail required for progress note): Reports that it is  still pretty frequent that she gets woken up but some nights she isn't gettnig woken up. Getting woken up near every four hours vs every 2 when we first started.      Goal Identifier Traveling   Goal Description Pt will tolerate driving or sitting in car for 2 hours without increasing pain > 0-2/10   Target Date 07/20/21   Date Met      Progress (detail required for progress note): She reports that hour long trips are really good. She is able to sit for 60 minutes without an increase in pain. Still gets an increase with 2+ hour trips       Plan:  Discharge from therapy.    Discharge:    Reason for Discharge: Patient has reached the end of our spine program in physical therapy. She is appropriate for discharge at this time with the recommendation to continue her HEP and strengthening at home and the [employer] once she gets her membership    Equipment Issued: none    Discharge Plan: Patient to continue home program.

## 2021-07-28 ENCOUNTER — PATIENT OUTREACH (OUTPATIENT)
Dept: FAMILY MEDICINE | Facility: OTHER | Age: 31
End: 2021-07-28

## 2021-07-28 ENCOUNTER — OFFICE VISIT (OUTPATIENT)
Dept: FAMILY MEDICINE | Facility: OTHER | Age: 31
End: 2021-07-28
Attending: CHIROPRACTOR
Payer: COMMERCIAL

## 2021-07-28 VITALS
DIASTOLIC BLOOD PRESSURE: 68 MMHG | BODY MASS INDEX: 26.29 KG/M2 | OXYGEN SATURATION: 97 % | SYSTOLIC BLOOD PRESSURE: 110 MMHG | WEIGHT: 158 LBS | HEART RATE: 97 BPM | TEMPERATURE: 98.4 F | RESPIRATION RATE: 16 BRPM

## 2021-07-28 DIAGNOSIS — M54.50 CHRONIC RIGHT-SIDED LOW BACK PAIN WITHOUT SCIATICA: Primary | ICD-10-CM

## 2021-07-28 DIAGNOSIS — G89.29 CHRONIC RIGHT-SIDED LOW BACK PAIN WITHOUT SCIATICA: Primary | ICD-10-CM

## 2021-07-28 PROCEDURE — G0463 HOSPITAL OUTPT CLINIC VISIT: HCPCS

## 2021-07-28 PROCEDURE — 99207 PR BACK PROGRAM: CPT | Performed by: CHIROPRACTOR

## 2021-07-28 ASSESSMENT — PAIN SCALES - GENERAL: PAINLEVEL: MILD PAIN (2)

## 2021-07-28 NOTE — TELEPHONE ENCOUNTER
Patient Quality Outreach      Summary:    Patient has the following on her problem list/HM:     Depression / Dysthymia review    6 Month Remission: 4-8 month window range: no  12 Month Remission: 10-14 month window range: no       PHQ-9 SCORE 5/8/2019 7/15/2020 10/22/2020   PHQ-9 Total Score 18 14 19       If PHQ-9 recheck is 5 or more, route to provider for next steps.    Asthma review       ACT Total Scores 10/22/2020   ACT TOTAL SCORE (Goal Greater than or Equal to 20) 25   In the past 12 months, how many times did you visit the emergency room for your asthma without being admitted to the hospital? 0   In the past 12 months, how many times were you hospitalized overnight because of your asthma? 0          Patient is due/failing the following:   ACT needed and Asthma follow-up visit, Cervical Cancer Screening - PAP Needed, PHQ-9 Needed and Depression follow-up visit and Adult/Adolescent physical, date due: now    Type of outreach:    Mail    Questions for provider review:    None                                                                                                                                     Noemi Ch LPN  7/28/2021 12:53 PM      Chart routed to n/aNoble

## 2021-07-28 NOTE — PROGRESS NOTES
Spine Therapy Program Final Report      Completion of Spine Therapy Program with good results.  Reports pain at 2/10 with residual right sided SI joint discomfort, albeit less frequent and intense.  Denies any sensory or motor loss.      Assessment finds mild weakness of normal left ankle dorsiflexion.  Segmental joint dysfunction at Right SI joint is also mild but apparent.    Yohana will be starting a work out program at the [facility].  She is encouraged to continue HEP as instructed and notify me in approximately 6 months with update.  Should she have any regression of symptoms or increased motor loss, we will consider referral for possible spinal injection and/or neuro consult.  Based on her attitude today and enthusiasm of staying well, I do not anticipate this occurring.  There may be, however, a need for additional physical therapy in the future should her pelvic pain not resolve after a few months.    Yohana demonstrated full understanding of the plan going forward and was very appreciative after today's visit and happy to have done the spinal therapy program.  She had no additional questions for me at this time but again was encouraged to contact me should any arise or her condition regress in any way.        Yandel Molina DC, GERTRUDIS  Director - Occupational Medicine Department  [facility] Board Certified  [facility]  [address] [place] [id number]  Phone [phone]  Fax [phone]    3:53 PM 7/28/2021

## 2021-07-28 NOTE — LETTER
July 28, 2021      Yohana Robertson  [address] [place] [address]      Your healthcare team cares about your health. To provide you with the best care,   we have reviewed your chart and based on our findings, we see that you are due to:     - ASTHMA FOLLOW UP:  Complete and return the attached Asthma Control Test.  If your total score is 19 or less or you have been to the ER or urgent care for your asthma, then please schedule an asthma follow-up appointment.   - DEPRESSION FOLLOW UP: Complete the attached questionnaires to ensure your mental health needs are being properly met.  Please fill them out and send back to us via [employer], and feel free to call us with any questions or concerns at [phone].    - CERVICAL CANCER SCREENING: Schedule a Cervical Cancer Screening, with Pap and wellness exam.     If you have already completed these items, please contact the clinic via phone or   [employer] so your care team can review and update your records. Thank you for   choosing [facility] for your healthcare needs. For any questions,   concerns, or to schedule an appointment please contact the clinic.       Healthy Regards,      Your [facility] Care Team          Enclosure: ACT and PHQ9

## 2021-07-28 NOTE — NURSING NOTE
Yohana is presenting for her 8 week follow up for the Spine Program.  Medication Reconciliation: nancy Ch LPN  7/28/2021 3:28 PM

## 2021-08-16 ENCOUNTER — MEDICAL CORRESPONDENCE (OUTPATIENT)
Dept: HEALTH INFORMATION MANAGEMENT | Facility: OTHER | Age: 31
End: 2021-08-16

## 2021-08-16 ENCOUNTER — TRANSFERRED RECORDS (OUTPATIENT)
Dept: HEALTH INFORMATION MANAGEMENT | Facility: OTHER | Age: 31
End: 2021-08-16

## 2021-09-23 ASSESSMENT — ASTHMA QUESTIONNAIRES
QUESTION_5 LAST FOUR WEEKS HOW WOULD YOU RATE YOUR ASTHMA CONTROL: POORLY CONTROLLED
ACT_TOTALSCORE: 11
QUESTION_2 LAST FOUR WEEKS HOW OFTEN HAVE YOU HAD SHORTNESS OF BREATH: ONCE A DAY
QUESTION_3 LAST FOUR WEEKS HOW OFTEN DID YOUR ASTHMA SYMPTOMS (WHEEZING, COUGHING, SHORTNESS OF BREATH, CHEST TIGHTNESS OR PAIN) WAKE YOU UP AT NIGHT OR EARLIER THAN USUAL IN THE MORNING: FOUR OR MORE NIGHTS A WEEK
QUESTION_1 LAST FOUR WEEKS HOW MUCH OF THE TIME DID YOUR ASTHMA KEEP YOU FROM GETTING AS MUCH DONE AT WORK, SCHOOL OR AT HOME: SOME OF THE TIME
QUESTION_4 LAST FOUR WEEKS HOW OFTEN HAVE YOU USED YOUR RESCUE INHALER OR NEBULIZER MEDICATION (SUCH AS ALBUTEROL): TWO OR THREE TIMES PER WEEK

## 2021-09-23 ASSESSMENT — PATIENT HEALTH QUESTIONNAIRE - PHQ9: SUM OF ALL RESPONSES TO PHQ QUESTIONS 1-9: 19

## 2021-09-24 ASSESSMENT — ASTHMA QUESTIONNAIRES: ACT_TOTALSCORE: 11

## 2021-10-09 ENCOUNTER — HEALTH MAINTENANCE LETTER (OUTPATIENT)
Age: 31
End: 2021-10-09

## 2022-03-24 ENCOUNTER — HOSPITAL ENCOUNTER (OUTPATIENT)
Dept: ULTRASOUND IMAGING | Facility: OTHER | Age: 32
Discharge: HOME | End: 2022-03-24
Attending: PHYSICIAN ASSISTANT
Payer: COMMERCIAL

## 2022-03-24 ENCOUNTER — NURSE TRIAGE (OUTPATIENT)
Dept: FAMILY MEDICINE | Facility: OTHER | Age: 32
End: 2022-03-24
Payer: COMMERCIAL

## 2022-03-24 ENCOUNTER — OFFICE VISIT (OUTPATIENT)
Dept: FAMILY MEDICINE | Facility: OTHER | Age: 32
End: 2022-03-24
Attending: PHYSICIAN ASSISTANT
Payer: COMMERCIAL

## 2022-03-24 VITALS
TEMPERATURE: 98.3 F | BODY MASS INDEX: 27.27 KG/M2 | RESPIRATION RATE: 16 BRPM | WEIGHT: 163.9 LBS | SYSTOLIC BLOOD PRESSURE: 138 MMHG | OXYGEN SATURATION: 100 % | HEART RATE: 74 BPM | DIASTOLIC BLOOD PRESSURE: 76 MMHG

## 2022-03-24 DIAGNOSIS — I80.02 THROMBOPHLEBITIS OF SUPERFICIAL VEINS OF LEFT LOWER EXTREMITY: ICD-10-CM

## 2022-03-24 DIAGNOSIS — Z91.89: Primary | ICD-10-CM

## 2022-03-24 PROCEDURE — 93971 EXTREMITY STUDY: CPT | Mod: LT

## 2022-03-24 PROCEDURE — 99213 OFFICE O/P EST LOW 20 MIN: CPT | Performed by: PHYSICIAN ASSISTANT

## 2022-03-24 PROCEDURE — G0463 HOSPITAL OUTPT CLINIC VISIT: HCPCS

## 2022-03-24 RX ORDER — CYCLOBENZAPRINE HCL 5 MG
5 TABLET ORAL 3 TIMES DAILY PRN
COMMUNITY
Start: 2022-03-18

## 2022-03-24 ASSESSMENT — PAIN SCALES - GENERAL: PAINLEVEL: MODERATE PAIN (4)

## 2022-03-24 NOTE — TELEPHONE ENCOUNTER
Likely is a superficial thrombophlebitis, but should probably go to [facility] for evaluation.  Oscar Robertson MD on 3/24/2022 at 12:59 PM

## 2022-03-24 NOTE — TELEPHONE ENCOUNTER
Provider Recommendation Follow Up:   Reached patient/caregiver. Informed of provider's recommendations. Patient verbalized understanding and agrees with the plan.     Kelly Eden RN .............. 3/24/2022  1:53 PM

## 2022-03-24 NOTE — PATIENT INSTRUCTIONS
Patient Education     Superficial Thrombophlebitis   The superficial veins are the veins near the surface of the skin. Superficial thrombophlebitis is a problem that occurs when one or more of these veins become red, irritated, and swollen. This is most often because of a blood clot.   Causes  The problem may occur after injury to a vein. It may also occur after having an intravenous (IV) line placed. Other factors that can make the problem more likely include:     Immunocompromised state    Varicose veins    Venous insufficiency    Bleeding disorders    Prolonged periods of rest and not moving around    IV drug abuse    Pregnancy    Use of birth control pills or estrogen therapy    Symptoms  Symptoms may appear in the affected area. They can include:    Pain    Tenderness    Redness    Warmth    Swelling    Hardening of the vein  In most cases, superficial thrombophlebitis resolves on its own with no problems. Treatment is focused on relieving symptoms.   Sometimes, there is a risk that the deep veins in the body may also be involved. This can lead to more serious problems. In such cases, further testing and treatments may be needed. Your healthcare provider can tell you more about this.     Home care  To help relieve pain and swelling, you may be told to:    Apply heat or cold to the affected area. Do this for up to 10 minutes as often as directed.  ? Heat: Use a warm compress, such as a heating pad.  ? Cold: Use a cold compress, such as a cold pack or bag of ice wrapped in a thin towel.    Take nonsteroidal anti-inflammatory drugs (NSAIDS), such as ibuprofen. In some cases, other pain medicines may be prescribed.    Keep the affected limb (arm or leg) raised above heart level as directed.    Wear elastic compression stockings or bandages as directed.    Don't sit or stand for long periods. Get up and walk often.    Follow-up care  Follow up with your healthcare provider as advised. If imaging tests are done,  they will be reviewed by a doctor. You ll be told the results and any new findings that may affect your treatment.     When to seek medical advice  Call your healthcare provider right away if any of these occur:    Fever of 100.4 F (38 C) or higher, or as directed by your healthcare provider    Increasing pain, swelling, or tenderness in the affected area    Spreading warmth or redness in the affected area    Call 911  Call 911 if any of these occur:     Trouble breathing    Chest pain or discomfort that worsens with deep breathing or coughing    Coughing (may cough up blood)    Fast or irregular heartbeat    Sweating    Anxiety    Lightheadedness, dizziness, or fainting    Extreme confusion    Extreme drowsiness or trouble waking up    New pain in the chest, arm, shoulder, neck, or upper back  [employer] last reviewed this educational content on 4/1/2018 2000-2021 [employer]. All rights reserved. This information is not intended as a substitute for professional medical care. Always follow your healthcare professional's instructions.

## 2022-03-24 NOTE — PROGRESS NOTES
ASSESSMENT/PLAN:    I have reviewed the nursing notes.  I have reviewed the findings, diagnosis, plan and need for follow up with the patient.    1. Thrombophlebitis of superficial veins of left lower extremity  2. At risk for thrombophlebitis  - US Lower Extremity Venous Duplex Left  - Vitals stable. PE consistent with superficial thrombophlebitis. Due to high risk of DVT, US was ordered, this returned negative/normal which is reassuring.   - I recommend the following to help relieve pain and swelling:    Apply heat or cold to the affected area. Do this for up to 10 minutes as often as directed.  ? Heat: Use a warm compress, such as a heating pad.  ? Cold: Use a cold compress, such as a cold pack or bag of ice wrapped in a thin towel.    Take nonsteroidal anti-inflammatory drugs (NSAIDS), such as ibuprofen. In some cases, other pain medicines may be prescribed.    Keep the affected limb (arm or leg) raised above heart level as directed.    Wear elastic compression stockings or bandages as directed.    Don't sit or stand for long periods. Get up and walk often.  - Follow up with primary care if symptoms persist.   - Patient is in agreement and understanding of the above treatment plan. All questions and concerns were addressed and answered to patient's satisfaction. AVS reviewed with patient.     Discussed warning signs/symptoms indicative of need to f/u    Follow up if symptoms persist or worsen or concerns    I explained my diagnostic considerations and recommendations to the patient, who voiced understanding and agreement with the treatment plan. All questions were answered. We discussed potential side effects of any prescribed or recommended therapies, as well as expectations for response to treatments.    Lisa Hernandez PA-C  3/24/2022  3:17 PM    HPI:    Yohana Robertson is a 31 year old female  who presents to [facility] today for concerns of left upper leg vein pain. She was recently at a hotel going  "from hotel to pool, hit knee on pool, back and forth between the two. She had a \"large, bulging vein\" with difficulty walking. Pain was 10/10 at worst, currently 4/10. Worse with walking and activity.     Her primary recommended come in due to her RA she is at higher risk for DVT. He is recommending she be evaluated her and rule out DVT.     She has never had a DVT or PE. Declines SOB or chest pain. History of heart defects in her family (at birth or later on in life develop a heart defect).     Patient is not anticoagulated. Former smoker, no OCP use. No recent surgeries or travel.     Past Medical History:   Diagnosis Date     Bunion of great toe of left foot     No Comments Provided     Contraceptive management     started on ALYSSIA     Endometriosis 07/2018    diagnosed laparoscopically     Past Surgical History:   Procedure Laterality Date     ARTHROSCOPY ANKLE, OPEN REPAIR LIGAMENT, COMBINED Right 8/6/2020    Procedure: Posterior ARTHROSCOPY, ANKLE, WITH OPEN REPAIR OF ANKLE LIGAMENT, resection of os trigonum, FHL tenosyonovectomy;  Surgeon: Sean Salcedo DPM;  Location:  OR     BUNIONECTOMY      4/2005,Left     COLONOSCOPY      7/2013,Normal     CYSTOSCOPY N/A 7/26/2018    Procedure: CYSTOSCOPY;;  Surgeon: Renate Ch MD;  Location:  OR     DENTAL SURGERY      wisdom teeth     FRACTURE SURGERY      6/13,L Foot-tarsometatarsal realignment arthrodesis with plate and screw fixation, capsule tendon balancing procedures about the first tarsometatarsal joint, Landry Gibson DPM  [facility]     hardware removal Left     7/26/2006,205448,REMOVAL OF FOREIGN BODY,Removal of harware L foot after bunionectomy [Other][[[[     LAPAROSCOPIC HYSTERECTOMY TOTAL, SALPINGECTOMY N/A 7/26/2018    Procedure: LAPAROSCOPIC HYSTERECTOMY TOTAL, SALPINGECTOMY;  Total Laparoscopic Hysterectomy & Bilateral Salpingectomy, Cystoscopy.;  Surgeon: Renate Ch MD;  Location:  OR     TONSILLECTOMY      12/2005 "     Social History     Tobacco Use     Smoking status: Former Smoker     Years: 9.00     Types: Vaping Device     Smokeless tobacco: Never Used     Tobacco comment: using e-cig.. approx 25 mg daily   Substance Use Topics     Alcohol use: Yes     Alcohol/week: 21.0 standard drinks     Comment: couple of brandys a night     Current Outpatient Medications   Medication Sig Dispense Refill     cyclobenzaprine (FLEXERIL) 5 MG tablet Take 5 mg by mouth 3 times daily as needed       sulfaSALAzine (AZULFIDINE) 500 MG tablet TAKE 1 TABLET BY MOUTH TWO TIMES A DAY FOR 7 DAYS, THEN 2 TABLETS TWO TIMES A DAY  DAYS.       ibuprofen (ADVIL/MOTRIN) 600 MG tablet Take 1 tablet (600 mg) by mouth every 8 hours as needed for moderate pain (Patient not taking: Reported on 3/24/2022) 90 tablet 3     Lidocaine (LIDOCARE) 4 % Patch Use for 12 hours and remove for 12 hours.  To prevent lidocaine toxicity, patient should be patch free for 12 hrs daily. (Patient not taking: Reported on 3/24/2022) 15 patch 3     tiZANidine (ZANAFLEX) 2 MG tablet TAKE 1 TO 2 TABLETS BY MOUTH THREE TIMES DAILY AS NEEDED FOR MUSCLESPASM (Patient not taking: Reported on 3/24/2022) 30 tablet 1     Allergies   Allergen Reactions     Codeine Shortness Of Breath and Hives     itching     Peanut-Derived Shortness Of Breath     Hives     Peanuts [Nuts] Shortness Of Breath     Hives     Past medical history, past surgical history, current medications and allergies reviewed and accurate to the best of my knowledge.      ROS:  Refer to HPI    /76   Pulse 74   Temp 98.3  F (36.8  C) (Tympanic)   Resp 16   Wt 74.3 kg (163 lb 14.4 oz)   LMP 07/01/2018 (LMP Unknown)   SpO2 100%   BMI 27.27 kg/m      EXAM:  General Appearance: Well appearing 31 year old female, appropriate appearance for age. No acute distress   Respiratory: normal chest wall and respirations.  Normal effort.  Clear to auscultation bilaterally, no wheezing, crackles or rhonchi.  No increased  work of breathing.  No cough appreciated.  Cardiac: RRR with no murmurs   Musculoskeletal:  Equal movement of bilateral upper extremities.  Equal movement of bilateral lower extremities.  Normal gait.  NTTP over left calf, thigh and popliteal fossa.   Dermatological: there is a 2.2 inch x 1.8 inch ecchymotic region which is TTP over left anterolateral shin. No calor, edema, fluctuance noted, no signs of infection  Psychological: normal affect, alert, oriented, and pleasant.     Labs:  None     Xray:  Results for orders placed or performed in visit on 03/24/22   US Lower Extremity Venous Duplex Left     Status: None    Narrative    Exam: US LOWER EXTREMITY VENOUS DUPLEX LEFT    Exam reason: rule out DVT LLE; At risk for thrombophlebitis    Comparison: None.    TECHNIQUE: Deep veins were evaluated using B-mode, color flow Doppler  and pulse wave spectral Doppler.      FINDINGS:    The common femoral, saphenofemoral junction, femoral and popliteal  veins are patent. There is no evidence of venous thrombus. These  vessels exhibited normal compressibility.    Flow is present in the posterior tibial and peroneal veins. These  vessels are compressible.       Impression    IMPRESSION:    No acute deep venous thrombosis.         MARYURI RUELAS MD         SYSTEM ID:  [id number]

## 2022-03-24 NOTE — TELEPHONE ENCOUNTER
"Nurse Triage SBAR    Is this a 2nd Level Triage? YES, LICENSED PRACTITIONER REVIEW IS REQUIRED    Situation:   Vein in leg was huge and dropped Pt to her knees. Leg now warm, tender, red, black and blue.    Background:   31 y.o. female. HLA B27 positive, RA, under care of rheumotology and neurology. Pt states, \"Half of my family is born with heart defects and have heart attacks and strokes, and the other half are not born with it, but develop it later in life.     Assessment:   Pt states she was at hotel in [place] Friday, when a vein in her left leg (near calf) swelled up and it dropped her to her knees. She couldn't walk immediately following, and sx improved with elevation and warm packs; ice was painful. Pt also noted intermittent fever and chills. Swelling has improved. Area by vein now black/blue/green, warm to touch and tender (achey, pain rated 3/10, increased with walking, and some pain also now present behind left knee), mild tingling/weakness. Denies: difficulty walking, chest pain, SOB, cold/blue appearance of left foot compared to right foot, back pain, rash    Protocol Recommended Disposition:   Go To ED/UCC Now (Or To Office With PCP Approval)    Recommendation:   Review and advise. Please call Pt at [phone]; ok to leave detailed message.     Routed to provider    Does the patient meet one of the following criteria for ADS visit consideration? No     Kelly Eden RN .............. 3/24/2022  11:55 AM      Reason for Disposition    Thigh or calf pain in only one leg and present > 1 hour    History of inherited increased risk of blood clots (e.g., factor 5 Leiden, antithrombin 3, protein C or protein S deficiency, prothrombin mutation)    Additional Information    Negative: Looks like a broken bone or dislocated joint (e.g., crooked or deformed)    Negative: Sounds like a life-threatening emergency to the triager    Negative: History of prior 'blood clot' in leg or lungs (i.e., deep vein " thrombosis, pulmonary embolism)    Negative: Chest pain    Negative: Difficulty breathing    Negative: Entire foot is cool or blue in comparison to other side    Negative: Unable to walk    Negative: Fever and red area (or area very tender to touch)    Negative: Fever and swollen joint    Negative: Thigh, calf, or ankle swelling in only one leg    Negative: Thigh, calf, or ankle swelling in both legs, but one side is definitely more swollen    Negative: Followed a hip injury    Negative: Followed a knee injury    Negative: Followed an ankle or foot injury    Negative: Back pain radiating (shooting) into leg(s)    Negative: Foot pain is the main symptom    Negative: Ankle pain is the main symptom    Negative: Knee pain is the main symptom    Negative: Leg swelling is the main symptom    Protocols used: LEG PAIN-A-OH
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
## 2022-04-04 ENCOUNTER — OFFICE VISIT (OUTPATIENT)
Dept: FAMILY MEDICINE | Facility: OTHER | Age: 32
End: 2022-04-04
Attending: CHIROPRACTOR
Payer: COMMERCIAL

## 2022-04-04 VITALS
OXYGEN SATURATION: 98 % | WEIGHT: 165.6 LBS | BODY MASS INDEX: 27.56 KG/M2 | SYSTOLIC BLOOD PRESSURE: 110 MMHG | HEART RATE: 84 BPM | DIASTOLIC BLOOD PRESSURE: 72 MMHG | RESPIRATION RATE: 18 BRPM | TEMPERATURE: 98.4 F

## 2022-04-04 DIAGNOSIS — Z53.9 ERRONEOUS ENCOUNTER--DISREGARD: Primary | ICD-10-CM

## 2022-04-04 PROCEDURE — G0463 HOSPITAL OUTPT CLINIC VISIT: HCPCS

## 2022-04-04 ASSESSMENT — PAIN SCALES - GENERAL: PAINLEVEL: EXTREME PAIN (8)
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
## 2022-04-04 NOTE — NURSING NOTE
Pt presents to clinic today for follow up 6 months hip and spine.      Medication Reconciliation: complete  Veronica Gurrola LPN

## 2022-04-06 ENCOUNTER — OFFICE VISIT (OUTPATIENT)
Dept: FAMILY MEDICINE | Facility: OTHER | Age: 32
End: 2022-04-06
Attending: CHIROPRACTOR
Payer: COMMERCIAL

## 2022-04-06 VITALS
SYSTOLIC BLOOD PRESSURE: 134 MMHG | WEIGHT: 167.6 LBS | HEART RATE: 103 BPM | DIASTOLIC BLOOD PRESSURE: 86 MMHG | RESPIRATION RATE: 20 BRPM | TEMPERATURE: 97.5 F | BODY MASS INDEX: 27.89 KG/M2 | OXYGEN SATURATION: 100 %

## 2022-04-06 DIAGNOSIS — Z53.9 ERRONEOUS ENCOUNTER--DISREGARD: Primary | ICD-10-CM

## 2022-04-06 PROCEDURE — G0463 HOSPITAL OUTPT CLINIC VISIT: HCPCS

## 2022-04-06 ASSESSMENT — PAIN SCALES - GENERAL: PAINLEVEL: EXTREME PAIN (8)
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
## 2022-04-21 ENCOUNTER — HOSPITAL ENCOUNTER (OUTPATIENT)
Dept: GENERAL RADIOLOGY | Facility: OTHER | Age: 32
Discharge: HOME | End: 2022-04-21
Attending: FAMILY MEDICINE
Payer: COMMERCIAL

## 2022-04-21 ENCOUNTER — OFFICE VISIT (OUTPATIENT)
Dept: FAMILY MEDICINE | Facility: OTHER | Age: 32
End: 2022-04-21
Attending: FAMILY MEDICINE
Payer: COMMERCIAL

## 2022-04-21 VITALS
HEART RATE: 82 BPM | SYSTOLIC BLOOD PRESSURE: 138 MMHG | RESPIRATION RATE: 16 BRPM | TEMPERATURE: 97.7 F | BODY MASS INDEX: 26.79 KG/M2 | DIASTOLIC BLOOD PRESSURE: 88 MMHG | WEIGHT: 161 LBS | OXYGEN SATURATION: 98 %

## 2022-04-21 DIAGNOSIS — M25.511 ACUTE PAIN OF RIGHT SHOULDER: Primary | ICD-10-CM

## 2022-04-21 DIAGNOSIS — M47.812 CERVICAL FACET JOINT SYNDROME: ICD-10-CM

## 2022-04-21 DIAGNOSIS — M25.511 ACUTE PAIN OF RIGHT SHOULDER: ICD-10-CM

## 2022-04-21 PROCEDURE — 20610 DRAIN/INJ JOINT/BURSA W/O US: CPT | Performed by: FAMILY MEDICINE

## 2022-04-21 PROCEDURE — 250N000009 HC RX 250: Performed by: FAMILY MEDICINE

## 2022-04-21 PROCEDURE — G0463 HOSPITAL OUTPT CLINIC VISIT: HCPCS | Mod: 25

## 2022-04-21 PROCEDURE — 73030 X-RAY EXAM OF SHOULDER: CPT | Mod: RT

## 2022-04-21 PROCEDURE — 250N000011 HC RX IP 250 OP 636: Performed by: FAMILY MEDICINE

## 2022-04-21 PROCEDURE — 99213 OFFICE O/P EST LOW 20 MIN: CPT | Mod: 25 | Performed by: FAMILY MEDICINE

## 2022-04-21 RX ORDER — TRIAMCINOLONE ACETONIDE 40 MG/ML
80 INJECTION, SUSPENSION INTRA-ARTICULAR; INTRAMUSCULAR ONCE
Status: COMPLETED | OUTPATIENT
Start: 2022-04-21 | End: 2022-04-21

## 2022-04-21 RX ORDER — BUPIVACAINE HYDROCHLORIDE 5 MG/ML
2 INJECTION, SOLUTION EPIDURAL; INTRACAUDAL ONCE
Status: COMPLETED | OUTPATIENT
Start: 2022-04-21 | End: 2022-04-21

## 2022-04-21 RX ORDER — LIDOCAINE HYDROCHLORIDE 10 MG/ML
2 INJECTION, SOLUTION EPIDURAL; INFILTRATION; INTRACAUDAL; PERINEURAL ONCE
Status: COMPLETED | OUTPATIENT
Start: 2022-04-21 | End: 2022-04-21

## 2022-04-21 RX ORDER — ALBUTEROL SULFATE 90 UG/1
2 AEROSOL, METERED RESPIRATORY (INHALATION) EVERY 4 HOURS PRN
COMMUNITY
End: 2022-09-14

## 2022-04-21 RX ADMIN — TRIAMCINOLONE ACETONIDE 80 MG: 40 INJECTION, SUSPENSION INTRA-ARTICULAR; INTRAMUSCULAR at 12:05

## 2022-04-21 RX ADMIN — BUPIVACAINE HYDROCHLORIDE 50 MG: 5 INJECTION, SOLUTION EPIDURAL; INTRACAUDAL; PERINEURAL at 12:07

## 2022-04-21 RX ADMIN — LIDOCAINE HYDROCHLORIDE 5 ML: 10 INJECTION, SOLUTION EPIDURAL; INFILTRATION; INTRACAUDAL; PERINEURAL at 12:09

## 2022-04-21 ASSESSMENT — PATIENT HEALTH QUESTIONNAIRE - PHQ9
SUM OF ALL RESPONSES TO PHQ QUESTIONS 1-9: 20
SUM OF ALL RESPONSES TO PHQ QUESTIONS 1-9: 20
10. IF YOU CHECKED OFF ANY PROBLEMS, HOW DIFFICULT HAVE THESE PROBLEMS MADE IT FOR YOU TO DO YOUR WORK, TAKE CARE OF THINGS AT HOME, OR GET ALONG WITH OTHER PEOPLE: VERY DIFFICULT

## 2022-04-21 ASSESSMENT — PAIN SCALES - GENERAL: PAINLEVEL: SEVERE PAIN (6)

## 2022-04-21 NOTE — LETTER
My Asthma Action Plan    Name: Yohana Robertson   YOB: 1990  Date: 4/21/2022   My doctor: Oscar Robertson MD   My clinic: [facility]        My Rescue Medicine:   Albuterol inhaler (Proair/Ventolin/Proventil HFA)  2-4 puffs EVERY 4 HOURS as needed. Use a spacer if recommended by your provider.   My Asthma Severity:   Intermittent / Exercise Induced  Know your asthma triggers: upper respiratory infections, pollens, emotions and pain             GREEN ZONE   Good Control    I feel good    No cough or wheeze    Can work, sleep and play without asthma symptoms       Take your asthma control medicine every day.     1. If exercise triggers your asthma, take your rescue medication    15 minutes before exercise or sports, and    During exercise if you have asthma symptoms  2. Spacer to use with inhaler: If you have a spacer, make sure to use it with your inhaler             YELLOW ZONE Getting Worse  I have ANY of these:    I do not feel good    Cough or wheeze    Chest feels tight    Wake up at night   1. Keep taking your Green Zone medications  2. Start taking your rescue medicine:    every 20 minutes for up to 1 hour. Then every 4 hours for 24-48 hours.  3. If you stay in the Yellow Zone for more than 12-24 hours, contact your doctor.  4. If you do not return to the Green Zone in 12-24 hours or you get worse, start taking your oral steroid medicine if prescribed by your provider.           RED ZONE Medical Alert - Get Help  I have ANY of these:    I feel awful    Medicine is not helping    Breathing getting harder    Trouble walking or talking    Nose opens wide to breathe       1. Take your rescue medicine NOW  2. If your provider has prescribed an oral steroid medicine, start taking it NOW  3. Call your doctor NOW  4. If you are still in the Red Zone after 20 minutes and you have not reached your doctor:    Take your rescue medicine again and    Call 911 or go to the emergency  room right away    See your regular doctor within 2 weeks of an Emergency Room or Urgent Care visit for follow-up treatment.          Annual Reminders:  Meet with Asthma Educator,  Flu Shot in the Fall, consider Pneumonia Vaccination for patients with asthma (aged 19 and older).    Pharmacy: [id number] - [place], [place] - [address]    Electronically signed by Oscar Robertson MD   Date: 04/21/22                    Asthma Triggers  How To Control Things That Make Your Asthma Worse    Triggers are things that make your asthma worse.  Look at the list below to help you find your triggers and   what you can do about them. You can help prevent asthma flare-ups by staying away from your triggers.      Trigger                                                          What you can do   Cigarette Smoke  Tobacco smoke can make asthma worse. Do not allow smoking in your home, car or around you.  Be sure no one smokes at a child s day care or school.  If you smoke, ask your health care provider for ways to help you quit.  Ask family members to quit too.  Ask your health care provider for a referral to Quit Plan to help you quit smoking, or call 0-568-594-PLAN.     Colds, Flu, Bronchitis  These are common triggers of asthma. Wash your hands often.  Don t touch your eyes, nose or mouth.  Get a flu shot every year.     Dust Mites  These are tiny bugs that live in cloth or carpet. They are too small to see. Wash sheets and blankets in hot water every week.   Encase pillows and mattress in dust mite proof covers.  Avoid having carpet if you can. If you have carpet, vacuum weekly.   Use a dust mask and HEPA vacuum.   Pollen and Outdoor Mold  Some people are allergic to trees, grass, or weed pollen, or molds. Try to keep your windows closed.  Limit time out doors when pollen count is high.   Ask you health care provider about taking medicine during allergy season.     Animal Dander  Some people are allergic  to skin flakes, urine or saliva from pets with fur or feathers. Keep pets with fur or feathers out of your home.    If you can t keep the pet outdoors, then keep the pet out of your bedroom.  Keep the bedroom door closed.  Keep pets off cloth furniture and away from stuffed toys.     Mice, Rats, and Cockroaches  Some people are allergic to the waste from these pests.   Cover food and garbage.  Clean up spills and food crumbs.  Store grease in the refrigerator.   Keep food out of the bedroom.   Indoor Mold  This can be a trigger if your home has high moisture. Fix leaking faucets, pipes, or other sources of water.   Clean moldy surfaces.  Dehumidify basement if it is damp and smelly.   Smoke, Strong Odors, and Sprays  These can reduce air quality. Stay away from strong odors and sprays, such as perfume, powder, hair spray, paints, smoke incense, paint, cleaning products, candles and new carpet.   Exercise or Sports  Some people with asthma have this trigger. Be active!  Ask your doctor about taking medicine before sports or exercise to prevent symptoms.    Warm up for 5-10 minutes before and after sports or exercise.     Other Triggers of Asthma  Cold air:  Cover your nose and mouth with a scarf.  Sometimes laughing or crying can be a trigger.  Some medicines and food can trigger asthma.

## 2022-04-21 NOTE — PROGRESS NOTES
"Nursing Notes:   Jessica Guerrero LPN  4/21/2022 10:59 AM  Signed  Chief Complaint   Patient presents with     Fall     2-3 months ago still having pain   She was rock climbing 2-3 months ago and was trying to come done and the safety strap malfunctioned and she fell 50 feet. She is still having pain in her neck and right shoulder area.  Jessica Guerrero LPN..................4/21/2022   10:39 AM      Initial /88   Pulse 82   Temp 97.7  F (36.5  C) (Temporal)   Resp 16   Wt 73 kg (161 lb)   LMP 07/01/2018 (LMP Unknown)   SpO2 98%   Breastfeeding No   BMI 26.79 kg/m   Estimated body mass index is 26.79 kg/m  as calculated from the following:    Height as of 6/29/21: 1.651 m (5' 5\").    Weight as of this encounter: 73 kg (161 lb).  Medication Reconciliation: complete    FOOD SECURITY SCREENING QUESTIONS  Hunger Vital Signs:  Within the past 12 months we worried whether our food would run out before we got money to buy more. Never  Within the past 12 months the food we bought just didn't last and we didn't have money to get more. Never        Advance care directive on file? no  Advance care directive provided to patient? declined     Jessica Guerrero LPN      SUBJECTIVE:  Yohana Robertson  is a 31 year old female who was rockclimbing at a camp 2 to 3 months ago and when she was coming down the safety strap malfunctioned and she free fell 50 feet. It grabbed a few feet from the ground. Someone helped flip her and she landed on her back and they kept her head from hitting.  She has been having pain in her neck and her right shoulder since then. She also was arm wrestling her dad and boyfriend. She has not really gotten better. She has been trying ice, heat, stretching, rest    She is HLA-B27 positive and has ankylosing spondylitis and has been followed by rheumatology.  She sees Dr. Benitez and is on sulfasalazine.  She saw him again in about a month ago and he increased her sulfasalazine.  They talked " about potentially adding Humira.  She did the spine program last year and completed that and did well.    Past Medical, Family, and Social History reviewed and updated as noted below.   ROS is negative except as noted above       Allergies   Allergen Reactions     Codeine Shortness Of Breath and Hives     itching     Peanut-Derived Shortness Of Breath     Hives     Peanuts [Nuts] Shortness Of Breath     Hives   ,   Family History   Problem Relation Age of Onset     Heart Disease Mother         Heart Disease,Aortic stenosis and valve replaced     Family History Negative Father         Good Health     Heart Disease Maternal Grandfather         Heart Disease     Asthma Maternal Grandmother         Asthma   ,   Current Outpatient Medications   Medication     albuterol (VENTOLIN HFA) 108 (90 Base) MCG/ACT inhaler     cyclobenzaprine (FLEXERIL) 5 MG tablet     ibuprofen (ADVIL/MOTRIN) 600 MG tablet     sulfaSALAzine (AZULFIDINE) 500 MG tablet     Lidocaine (LIDOCARE) 4 % Patch     tiZANidine (ZANAFLEX) 2 MG tablet     No current facility-administered medications for this visit.   ,   Past Medical History:   Diagnosis Date     Bunion of great toe of left foot     No Comments Provided     Contraceptive management     started on ALYSSIA     Endometriosis 07/2018    diagnosed laparoscopically   ,   Patient Active Problem List    Diagnosis Date Noted     HLA B27 (HLA B27 positive) 01/25/2021     Priority: Medium     Tear of talofibular ligament of right lower extremity, subsequent encounter 06/26/2020     Priority: Medium     Added automatically from request for surgery [id number]       S/P laparoscopic hysterectomy 07/26/2018     Priority: Medium     Former smoker 07/03/2018     Priority: Medium     Asthma 02/11/2018     Priority: Medium     Attention deficit disorder 02/11/2018     Priority: Medium     Acute bilateral low back pain with sciatica 07/27/2017     Priority: Medium     Anxiety 01/09/2014     Priority: Medium     Severe  episode of recurrent major depressive disorder (H) 12/26/2013     Priority: Medium     Nonallopathic lesion of lumbar region 08/08/2013     Priority: Medium     IBS (irritable bowel syndrome) 03/28/2013     Priority: Medium     Bunion, left foot 04/11/2012     Priority: Medium     Common migraine 04/20/2011     Priority: Medium   ,   Past Surgical History:   Procedure Laterality Date     ARTHROSCOPY ANKLE, OPEN REPAIR LIGAMENT, COMBINED Right 8/6/2020    Procedure: Posterior ARTHROSCOPY, ANKLE, WITH OPEN REPAIR OF ANKLE LIGAMENT, resection of os trigonum, FHL tenosyonovectomy;  Surgeon: Sean Salcedo DPM;  Location:  OR     BUNIONECTOMY      4/2005,Left     COLONOSCOPY      7/2013,Normal     CYSTOSCOPY N/A 7/26/2018    Procedure: CYSTOSCOPY;;  Surgeon: Renate Ch MD;  Location:  OR     DENTAL SURGERY      wisdom teeth     FRACTURE SURGERY      6/13,L Foot-tarsometatarsal realignment arthrodesis with plate and screw fixation, capsule tendon balancing procedures about the first tarsometatarsal joint, Landry Gibson DPM  [facility]     hardware removal Left     7/26/2006,640007,REMOVAL OF FOREIGN BODY,Removal of harware L foot after bunionectomy [Other][[[[     LAPAROSCOPIC HYSTERECTOMY TOTAL, SALPINGECTOMY N/A 7/26/2018    Procedure: LAPAROSCOPIC HYSTERECTOMY TOTAL, SALPINGECTOMY;  Total Laparoscopic Hysterectomy & Bilateral Salpingectomy, Cystoscopy.;  Surgeon: Renate Ch MD;  Location:  OR     TONSILLECTOMY      12/2005    and   Social History     Tobacco Use     Smoking status: Former Smoker     Years: 9.00     Types: Vaping Device     Smokeless tobacco: Never Used     Tobacco comment: using e-cig.. approx 25 mg daily   Substance Use Topics     Alcohol use: Yes     Alcohol/week: 21.0 standard drinks     Comment: couple of brandys a night     OBJECTIVE:  /88   Pulse 82   Temp 97.7  F (36.5  C) (Temporal)   Resp 16   Wt 73 kg (161 lb)   LMP 07/01/2018 (LMP Unknown)    SpO2 98%   Breastfeeding No   BMI 26.79 kg/m     EXAM:  Alert cooperative, no distress.  Neck range of motion is diminished to the left on lateral bending and rotation.  Some myofascial knots and tightness in the trapezius and neck strap muscles are noted.  She has no focal neurologic findings in her upper extremities.  Shoulder range of motion is preserved but she has pain on both abduction and flexion and positive impingement testing with no weakness on isolation of the supraspinatus is noted.  X-rays negative for fracture.  ASSESSMENT/Plan :    Yohana was seen today for fall.    Diagnoses and all orders for this visit:    Acute pain of right shoulder  -     XR Shoulder Right G/E 3 Views; Future  -     Large Joint/Bursa injection and/or drainage (Shoulder, Knee)  -     triamcinolone (KENALOG-40) injection 80 mg  -     Physical Therapy Referral; Future  -     OMNICELL bupivacaine (MARCAINE) preservative free injection 0.5%  -     lidocaine (PF) (XYLOCAINE) 1 % injection 2 mL    Cervical facet joint syndrome  -     Physical Therapy Referral; Future      I think she likely has a lot of cervical facet and would benefit from manual therapy and referral was sent for same.    Discussed PT for her shoulder as well but I think she benefit from subacromial space injection and she like to go ahead with that.     RIGHT SHOULDER INJECTION: Risks, benefits, alternatives discussed, consent obtained Pt wishes to proceed.  Chloraprep used for sterile prep x2 prior to injection. Timeout is performed. Ethyl Chloride used as skin refrigerant for topical anesthesia. A posterior approach is used.  The subacromial space is injected with a mixture of 2ml each of 1% lidocaine without epinephrine, 0.5% Marcaine, and Kenalog 40mg/ml. After injection, bandaid placed. Patient noted improvement in symptoms.  Ice 20 min tid and prn.  RTC if not improved over next 7 days.     Oscar Robertson MD            Answers for HPI/ROS submitted by the  patient on 4/21/2022  If you checked off any problems, how difficult have these problems made it for you to do your work, take care of things at home, or get along with other people?: Very difficult  PHQ9 TOTAL SCORE: 20  Headache Symptoms are: no change  How often are you getting headaches or migraines? : Daily  Are you able to do normal daily activities when you have a migraine?: Yes  Migraine Rescue/Relief Medications:: no rescue/relief medications  Effectiveness of rescue/relief medications:: I get no relief  Migraine Preventative Medications:: no medications to prevent migraines  ER or UC Visits:: 0 times  Do you have a cough?: Yes  Are you experiencing any wheezing in your chest?: Yes  Do you have any shortness of breath?: Yes  Use of rescue inhaler:: a few times a week  Taking Asthma medication as prescribed:: 0  Asthma triggers:: unaware of any triggers, emotions, smoke  Have you had any Emergency Room visits, Urgent Care visits, or Hospital Admissions since your last office visit?: No  Your back pain is: chronic  Where is your back pain located? : right lower back, right middle of back, right side of neck, right shoulder, right hip  How would you describe your back pain? : burning, cramping, dull ache, sharp, shooting  Where does your back pain spread? : right buttocks, right thigh, right knee, right shoulder, right side of neck  Since you noticed your back pain, how has it changed? : always present, but gets better and worse  Does your back pain interfere with your job?: Not applicable  How many servings of fruits and vegetables do you eat daily?: 2-3  On average, how many sweetened beverages do you drink each day (Examples: soda, juice, sweet tea, etc.  Do NOT count diet or artificially sweetened beverages)?: 3  How many minutes a day do you exercise enough to make your heart beat faster?: 10 to 19  How many days a week do you exercise enough to make your heart beat faster?: 3 or less  How many days per  week do you miss taking your medication?: 2  What makes it hard for you to take your medication every day?: remembering to take  What is the reason for your visit today?: Neck/shoulder pain and refill on inhaler  When did your symptoms begin?: 3-4 weeks ago  What are your symptoms?: Headache weakness numbness tingling  How would you describe these symptoms?: Severe  Are your symptoms:: Staying the same  Have you had these symptoms before?: No  Is there anything that makes you feel worse?: Movement  Is there anything that makes you feel better?: Not moving but no

## 2022-04-21 NOTE — NURSING NOTE
"Chief Complaint   Patient presents with     Fall     2-3 months ago still having pain   She was rock climbing 2-3 months ago and was trying to come done and the safety strap malfunctioned and she fell 50 feet. She is still having pain in her neck and right shoulder area.  Jessica Guerrero LPN..................4/21/2022   10:39 AM      Initial /88   Pulse 82   Temp 97.7  F (36.5  C) (Temporal)   Resp 16   Wt 73 kg (161 lb)   LMP 07/01/2018 (LMP Unknown)   SpO2 98%   Breastfeeding No   BMI 26.79 kg/m   Estimated body mass index is 26.79 kg/m  as calculated from the following:    Height as of 6/29/21: 1.651 m (5' 5\").    Weight as of this encounter: 73 kg (161 lb).  Medication Reconciliation: complete    FOOD SECURITY SCREENING QUESTIONS  Hunger Vital Signs:  Within the past 12 months we worried whether our food would run out before we got money to buy more. Never  Within the past 12 months the food we bought just didn't last and we didn't have money to get more. Never        Advance care directive on file? no  Advance care directive provided to patient? declined     Jessica Guerrero LPN  "

## 2022-05-03 ENCOUNTER — OFFICE VISIT (OUTPATIENT)
Dept: FAMILY MEDICINE | Facility: OTHER | Age: 32
End: 2022-05-03
Attending: STUDENT IN AN ORGANIZED HEALTH CARE EDUCATION/TRAINING PROGRAM
Payer: COMMERCIAL

## 2022-05-03 VITALS
HEART RATE: 78 BPM | DIASTOLIC BLOOD PRESSURE: 78 MMHG | RESPIRATION RATE: 16 BRPM | WEIGHT: 159 LBS | BODY MASS INDEX: 26.49 KG/M2 | HEIGHT: 65 IN | SYSTOLIC BLOOD PRESSURE: 120 MMHG | TEMPERATURE: 98.5 F

## 2022-05-03 DIAGNOSIS — B37.0 THRUSH: Primary | ICD-10-CM

## 2022-05-03 PROCEDURE — G0463 HOSPITAL OUTPT CLINIC VISIT: HCPCS

## 2022-05-03 PROCEDURE — 99213 OFFICE O/P EST LOW 20 MIN: CPT | Performed by: STUDENT IN AN ORGANIZED HEALTH CARE EDUCATION/TRAINING PROGRAM

## 2022-05-03 RX ORDER — NYSTATIN 100000/ML
500000 SUSPENSION, ORAL (FINAL DOSE FORM) ORAL 4 TIMES DAILY
Qty: 140 ML | Refills: 1 | Status: SHIPPED | OUTPATIENT
Start: 2022-05-03 | End: 2023-06-29

## 2022-05-03 ASSESSMENT — PAIN SCALES - GENERAL: PAINLEVEL: NO PAIN (0)

## 2022-05-03 NOTE — NURSING NOTE
"Chief Complaint   Patient presents with     Mouth Problem     Patient presented to the clinic with swelling and rash on her tongue that she noticed this morning.     Initial /78 (BP Location: Right arm, Patient Position: Sitting, Cuff Size: Adult Regular)   Pulse 78   Temp 98.5  F (36.9  C) (Tympanic)   Resp 16   Ht 1.651 m (5' 5\")   Wt 72.1 kg (159 lb)   LMP 07/01/2018 (LMP Unknown)   Breastfeeding No   BMI 26.46 kg/m   Estimated body mass index is 26.46 kg/m  as calculated from the following:    Height as of this encounter: 1.651 m (5' 5\").    Weight as of this encounter: 72.1 kg (159 lb).       FOOD SECURITY SCREENING QUESTIONS:    The next two questions are to help us understand your food security.  If you are feeling you need any assistance in this area, we have resources available to support you today.    Hunger Vital Signs:  Within the past 12 months we worried whether our food would run out before we got money to buy more. Never  Within the past 12 months the food we bought just didn't last and we didn't have money to get more. Never      Medication Reconciliation: Complete      Rose Mccormack LPN  "

## 2022-05-03 NOTE — PROGRESS NOTES
"  Assessment & Plan     Thrush  Exam and symptoms consistent with oral thrush. Dentures increases her risk. Discussed treatment as below, may need to extend to 14 days total if on going symptoms. Advised to get a new toothbrush, good oral hygiene and cleaning of dentures. Keep mouth moisturized  - nystatin (MYCOSTATIN) 344897 UNIT/ML suspension; Take 5 mLs (500,000 Units) by mouth 4 times daily for 7 days    Mandie Stephenson MD  [facility]    [facility]   Yohana is a 31 year old who presents for the following health issues     HPI     1 day of throat and cheek irritation  - start earlier today  - feels like tongue and cheeks or irritated and have something covering them   - no pain   - some throat irritation   - no new products  - feels like mouth is dry right now but usually doesn't suffer from dry mouth   - has partial upper dentures          Review of Systems   Constitutional, HEENT, cardiovascular, pulmonary, gi and gu systems are negative, except as otherwise noted.      Objective    /78 (BP Location: Right arm, Patient Position: Sitting, Cuff Size: Adult Regular)   Pulse 78   Temp 98.5  F (36.9  C) (Tympanic)   Resp 16   Ht 1.651 m (5' 5\")   Wt 72.1 kg (159 lb)   LMP 07/01/2018 (LMP Unknown)   Breastfeeding No   BMI 26.46 kg/m    Body mass index is 26.46 kg/m .  Physical Exam   GENERAL: healthy, alert and no distress  HENT: normal cephalic/atraumatic, ear canals and TM's normal. Tongue erythematous with pink scattered papules and patches, cheeks with white plaque bilaterally. Posterior oropharynx clear  NECK: no adenopathy, no asymmetry, masses, or scars and thyroid normal to palpation  PSYCH: mentation appears normal, affect normal/bright                "

## 2022-05-21 ENCOUNTER — HEALTH MAINTENANCE LETTER (OUTPATIENT)
Age: 32
End: 2022-05-21

## 2022-09-14 ENCOUNTER — HOSPITAL ENCOUNTER (OUTPATIENT)
Dept: GENERAL RADIOLOGY | Facility: OTHER | Age: 32
Discharge: HOME | End: 2022-09-14
Attending: PHYSICIAN ASSISTANT
Payer: COMMERCIAL

## 2022-09-14 ENCOUNTER — OFFICE VISIT (OUTPATIENT)
Dept: FAMILY MEDICINE | Facility: OTHER | Age: 32
End: 2022-09-14
Attending: PHYSICIAN ASSISTANT
Payer: COMMERCIAL

## 2022-09-14 VITALS
HEART RATE: 84 BPM | DIASTOLIC BLOOD PRESSURE: 70 MMHG | BODY MASS INDEX: 27.82 KG/M2 | WEIGHT: 167.2 LBS | RESPIRATION RATE: 16 BRPM | OXYGEN SATURATION: 98 % | SYSTOLIC BLOOD PRESSURE: 110 MMHG | TEMPERATURE: 98.4 F

## 2022-09-14 DIAGNOSIS — M25.522 LEFT ELBOW PAIN: ICD-10-CM

## 2022-09-14 DIAGNOSIS — M89.8X2 PAIN OF LEFT HUMERUS: Primary | ICD-10-CM

## 2022-09-14 DIAGNOSIS — M89.8X2 PAIN OF LEFT HUMERUS: ICD-10-CM

## 2022-09-14 DIAGNOSIS — J45.20 MILD INTERMITTENT ASTHMA WITHOUT COMPLICATION: ICD-10-CM

## 2022-09-14 PROCEDURE — 73080 X-RAY EXAM OF ELBOW: CPT | Mod: LT

## 2022-09-14 PROCEDURE — 73060 X-RAY EXAM OF HUMERUS: CPT | Mod: LT

## 2022-09-14 PROCEDURE — G0463 HOSPITAL OUTPT CLINIC VISIT: HCPCS | Mod: 25

## 2022-09-14 PROCEDURE — G0463 HOSPITAL OUTPT CLINIC VISIT: HCPCS

## 2022-09-14 PROCEDURE — 99213 OFFICE O/P EST LOW 20 MIN: CPT | Performed by: PHYSICIAN ASSISTANT

## 2022-09-14 RX ORDER — ALBUTEROL SULFATE 90 UG/1
2 AEROSOL, METERED RESPIRATORY (INHALATION) EVERY 4 HOURS PRN
Qty: 18 G | Refills: 3 | Status: SHIPPED | OUTPATIENT
Start: 2022-09-14 | End: 2023-05-16

## 2022-09-14 ASSESSMENT — ANXIETY QUESTIONNAIRES
7. FEELING AFRAID AS IF SOMETHING AWFUL MIGHT HAPPEN: SEVERAL DAYS
7. FEELING AFRAID AS IF SOMETHING AWFUL MIGHT HAPPEN: SEVERAL DAYS
GAD7 TOTAL SCORE: 8
2. NOT BEING ABLE TO STOP OR CONTROL WORRYING: MORE THAN HALF THE DAYS
5. BEING SO RESTLESS THAT IT IS HARD TO SIT STILL: SEVERAL DAYS
6. BECOMING EASILY ANNOYED OR IRRITABLE: SEVERAL DAYS
GAD7 TOTAL SCORE: 8
IF YOU CHECKED OFF ANY PROBLEMS ON THIS QUESTIONNAIRE, HOW DIFFICULT HAVE THESE PROBLEMS MADE IT FOR YOU TO DO YOUR WORK, TAKE CARE OF THINGS AT HOME, OR GET ALONG WITH OTHER PEOPLE: SOMEWHAT DIFFICULT
1. FEELING NERVOUS, ANXIOUS, OR ON EDGE: SEVERAL DAYS
GAD7 TOTAL SCORE: 8
8. IF YOU CHECKED OFF ANY PROBLEMS, HOW DIFFICULT HAVE THESE MADE IT FOR YOU TO DO YOUR WORK, TAKE CARE OF THINGS AT HOME, OR GET ALONG WITH OTHER PEOPLE?: SOMEWHAT DIFFICULT
3. WORRYING TOO MUCH ABOUT DIFFERENT THINGS: SEVERAL DAYS
4. TROUBLE RELAXING: SEVERAL DAYS

## 2022-09-14 ASSESSMENT — PATIENT HEALTH QUESTIONNAIRE - PHQ9
10. IF YOU CHECKED OFF ANY PROBLEMS, HOW DIFFICULT HAVE THESE PROBLEMS MADE IT FOR YOU TO DO YOUR WORK, TAKE CARE OF THINGS AT HOME, OR GET ALONG WITH OTHER PEOPLE: SOMEWHAT DIFFICULT
SUM OF ALL RESPONSES TO PHQ QUESTIONS 1-9: 12
SUM OF ALL RESPONSES TO PHQ QUESTIONS 1-9: 12

## 2022-09-14 ASSESSMENT — ASTHMA QUESTIONNAIRES: ACT_TOTALSCORE: 17

## 2022-09-14 ASSESSMENT — PAIN SCALES - GENERAL: PAINLEVEL: EXTREME PAIN (8)

## 2022-09-14 NOTE — PATIENT INSTRUCTIONS
Encouraged to take ibuprofen for relief up to 4 times per day.  Encouraged rest and elevation.  Encouraged to use ice or heat 15 minutes at a time several times per day to decrease pain. Return to clinic in 1-2 weeks as necessary for persistent pain. Return to clinic with any change or worsening of symptoms.   Increase activity as tolerated.
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
## 2022-09-17 ENCOUNTER — HEALTH MAINTENANCE LETTER (OUTPATIENT)
Age: 32
End: 2022-09-17

## 2022-10-07 ENCOUNTER — MEDICAL CORRESPONDENCE (OUTPATIENT)
Dept: HEALTH INFORMATION MANAGEMENT | Facility: OTHER | Age: 32
End: 2022-10-07

## 2022-10-21 ENCOUNTER — HOSPITAL ENCOUNTER (EMERGENCY)
Facility: OTHER | Age: 32
Discharge: HOME | End: 2022-10-21
Attending: PHYSICIAN ASSISTANT | Admitting: PHYSICIAN ASSISTANT
Payer: COMMERCIAL

## 2022-10-21 ENCOUNTER — APPOINTMENT (OUTPATIENT)
Dept: GENERAL RADIOLOGY | Facility: OTHER | Age: 32
End: 2022-10-21
Attending: STUDENT IN AN ORGANIZED HEALTH CARE EDUCATION/TRAINING PROGRAM
Payer: COMMERCIAL

## 2022-10-21 VITALS
WEIGHT: 167 LBS | RESPIRATION RATE: 16 BRPM | HEART RATE: 95 BPM | TEMPERATURE: 99 F | SYSTOLIC BLOOD PRESSURE: 138 MMHG | HEIGHT: 65 IN | OXYGEN SATURATION: 98 % | BODY MASS INDEX: 27.82 KG/M2 | DIASTOLIC BLOOD PRESSURE: 93 MMHG

## 2022-10-21 DIAGNOSIS — S62.175A: ICD-10-CM

## 2022-10-21 PROCEDURE — 73110 X-RAY EXAM OF WRIST: CPT | Mod: LT

## 2022-10-21 PROCEDURE — 99283 EMERGENCY DEPT VISIT LOW MDM: CPT | Performed by: PHYSICIAN ASSISTANT

## 2022-10-21 NOTE — DISCHARGE INSTRUCTIONS
Get plenty of fluids and rest.  Alternate Tylenol and ibuprofen every 4 hours, use rest, ice, compression, elevation.  Wear your thumb spica splint is much as possible.  I spoke with orthopedic surgeon, they recommend that she follow-up next week in clinic, this referral was placed and they will be contacting you.  You may need a cast at that time.  Return if there are worsening concerning symptoms.
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
## 2022-10-23 ASSESSMENT — ENCOUNTER SYMPTOMS
ABDOMINAL PAIN: 0
NECK STIFFNESS: 0
CONFUSION: 0
DIFFICULTY URINATING: 0
SHORTNESS OF BREATH: 0
HEADACHES: 0
ARTHRALGIAS: 0
VOMITING: 0
EYE REDNESS: 0
DYSURIA: 0
FEVER: 0
COLOR CHANGE: 0
NAUSEA: 0
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
## 2022-10-27 ENCOUNTER — OFFICE VISIT (OUTPATIENT)
Dept: FAMILY MEDICINE | Facility: OTHER | Age: 32
End: 2022-10-27
Attending: PHYSICIAN ASSISTANT
Payer: COMMERCIAL

## 2022-10-27 ENCOUNTER — HOSPITAL ENCOUNTER (OUTPATIENT)
Dept: GENERAL RADIOLOGY | Facility: OTHER | Age: 32
Discharge: HOME | End: 2022-10-27
Attending: FAMILY MEDICINE
Payer: COMMERCIAL

## 2022-10-27 VITALS
OXYGEN SATURATION: 99 % | BODY MASS INDEX: 29.55 KG/M2 | RESPIRATION RATE: 16 BRPM | HEART RATE: 78 BPM | DIASTOLIC BLOOD PRESSURE: 76 MMHG | SYSTOLIC BLOOD PRESSURE: 118 MMHG | WEIGHT: 177.6 LBS | TEMPERATURE: 98.7 F

## 2022-10-27 DIAGNOSIS — S62.175A CLOSED NONDISPLACED FRACTURE OF TRAPEZIUM OF LEFT WRIST, INITIAL ENCOUNTER: Primary | ICD-10-CM

## 2022-10-27 PROCEDURE — 25630 CLTX CARPL FX W/O MNPJ EA B1: CPT | Performed by: FAMILY MEDICINE

## 2022-10-27 PROCEDURE — 73140 X-RAY EXAM OF FINGER(S): CPT | Mod: LT

## 2022-10-27 PROCEDURE — G0463 HOSPITAL OUTPT CLINIC VISIT: HCPCS

## 2022-10-27 ASSESSMENT — PAIN SCALES - GENERAL: PAINLEVEL: SEVERE PAIN (7)
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
## 2022-11-10 ENCOUNTER — OFFICE VISIT (OUTPATIENT)
Dept: FAMILY MEDICINE | Facility: OTHER | Age: 32
End: 2022-11-10
Attending: FAMILY MEDICINE
Payer: COMMERCIAL

## 2022-11-10 ENCOUNTER — HOSPITAL ENCOUNTER (OUTPATIENT)
Dept: GENERAL RADIOLOGY | Facility: OTHER | Age: 32
Discharge: HOME | End: 2022-11-10
Attending: FAMILY MEDICINE
Payer: COMMERCIAL

## 2022-11-10 VITALS
RESPIRATION RATE: 18 BRPM | HEART RATE: 90 BPM | OXYGEN SATURATION: 99 % | SYSTOLIC BLOOD PRESSURE: 131 MMHG | DIASTOLIC BLOOD PRESSURE: 87 MMHG | WEIGHT: 175.6 LBS | TEMPERATURE: 98 F | BODY MASS INDEX: 29.22 KG/M2

## 2022-11-10 DIAGNOSIS — S62.341D CLOSED NONDISPLACED FRACTURE OF BASE OF SECOND METACARPAL BONE OF LEFT HAND WITH ROUTINE HEALING, SUBSEQUENT ENCOUNTER: Primary | ICD-10-CM

## 2022-11-10 PROCEDURE — G0463 HOSPITAL OUTPT CLINIC VISIT: HCPCS

## 2022-11-10 PROCEDURE — 99207 PR FRACTURE CARE IN GLOBAL PERIOD: CPT | Performed by: FAMILY MEDICINE

## 2022-11-10 PROCEDURE — 73110 X-RAY EXAM OF WRIST: CPT | Mod: LT

## 2022-11-10 ASSESSMENT — PAIN SCALES - GENERAL: PAINLEVEL: MILD PAIN (2)
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
## 2022-11-10 NOTE — NURSING NOTE
Pt presents to clinic today for A FOLLOW UP ON FX OF TRAPEZIUM OF LEFT WRIST. Patient states she has had numbness and tingling of her thumb since getting her cast on.       FOOD SECURITY SCREENING QUESTIONS:    The next two questions are to help us understand your food security.  If you are feeling you need any assistance in this area, we have resources available to support you today.    Hunger Vital Signs:  Within the past 12 months we worried whether our food would run out before we got money to buy more. Never  Within the past 12 months the food we bought just didn't last and we didn't have money to get more. Never          Medication Reconciliation: complete  Grover Palmer, LEX,LPN on 11/10/2022 at 10:27 AM

## 2022-11-18 ENCOUNTER — MYC MEDICAL ADVICE (OUTPATIENT)
Dept: FAMILY MEDICINE | Facility: OTHER | Age: 32
End: 2022-11-18

## 2022-11-18 ASSESSMENT — ASTHMA QUESTIONNAIRES
ACT_TOTALSCORE: 12
QUESTION_2 LAST FOUR WEEKS HOW OFTEN HAVE YOU HAD SHORTNESS OF BREATH: ONCE A DAY
ACT_TOTALSCORE: 12
QUESTION_3 LAST FOUR WEEKS HOW OFTEN DID YOUR ASTHMA SYMPTOMS (WHEEZING, COUGHING, SHORTNESS OF BREATH, CHEST TIGHTNESS OR PAIN) WAKE YOU UP AT NIGHT OR EARLIER THAN USUAL IN THE MORNING: TWO OR THREE NIGHTS A WEEK
QUESTION_5 LAST FOUR WEEKS HOW WOULD YOU RATE YOUR ASTHMA CONTROL: POORLY CONTROLLED
QUESTION_4 LAST FOUR WEEKS HOW OFTEN HAVE YOU USED YOUR RESCUE INHALER OR NEBULIZER MEDICATION (SUCH AS ALBUTEROL): TWO OR THREE TIMES PER WEEK
QUESTION_1 LAST FOUR WEEKS HOW MUCH OF THE TIME DID YOUR ASTHMA KEEP YOU FROM GETTING AS MUCH DONE AT WORK, SCHOOL OR AT HOME: SOME OF THE TIME

## 2022-12-07 ENCOUNTER — HOSPITAL ENCOUNTER (OUTPATIENT)
Dept: GENERAL RADIOLOGY | Facility: OTHER | Age: 32
Discharge: HOME | End: 2022-12-07
Attending: FAMILY MEDICINE
Payer: COMMERCIAL

## 2022-12-07 ENCOUNTER — OFFICE VISIT (OUTPATIENT)
Dept: FAMILY MEDICINE | Facility: OTHER | Age: 32
End: 2022-12-07
Attending: FAMILY MEDICINE
Payer: COMMERCIAL

## 2022-12-07 VITALS
SYSTOLIC BLOOD PRESSURE: 110 MMHG | HEART RATE: 83 BPM | DIASTOLIC BLOOD PRESSURE: 72 MMHG | TEMPERATURE: 99.2 F | WEIGHT: 177.2 LBS | OXYGEN SATURATION: 100 % | RESPIRATION RATE: 16 BRPM | BODY MASS INDEX: 29.49 KG/M2

## 2022-12-07 DIAGNOSIS — S62.341D CLOSED NONDISPLACED FRACTURE OF BASE OF SECOND METACARPAL BONE OF LEFT HAND WITH ROUTINE HEALING, SUBSEQUENT ENCOUNTER: Primary | ICD-10-CM

## 2022-12-07 PROCEDURE — 99207 PR FRACTURE CARE IN GLOBAL PERIOD: CPT | Performed by: FAMILY MEDICINE

## 2022-12-07 PROCEDURE — 73130 X-RAY EXAM OF HAND: CPT | Mod: LT

## 2022-12-07 PROCEDURE — G0463 HOSPITAL OUTPT CLINIC VISIT: HCPCS

## 2022-12-07 ASSESSMENT — PAIN SCALES - GENERAL: PAINLEVEL: MILD PAIN (2)
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
## 2022-12-07 NOTE — NURSING NOTE
"Chief Complaint   Patient presents with     Follow Up     Fracture base of 2nd metacarpal bone left hand      Patient is here for follow up fracture of base of 2nd metacarpal bone of left hand. Pain 1-2/10. She has an intact thumb spica cast on at appointment today.     Initial /72 (BP Location: Right arm, Patient Position: Sitting, Cuff Size: Adult Regular)   Pulse 83   Temp 99.2  F (37.3  C) (Tympanic)   Resp 16   Wt 80.4 kg (177 lb 3.2 oz)   LMP 07/01/2018 (LMP Unknown)   SpO2 100%   BMI 29.49 kg/m   Estimated body mass index is 29.49 kg/m  as calculated from the following:    Height as of 10/21/22: 1.651 m (5' 5\").    Weight as of this encounter: 80.4 kg (177 lb 3.2 oz).       Medication Reconciliation: Complete    Autumn Mosqueda LPN .......  12/7/2022  3:30 PM   "

## 2022-12-19 ENCOUNTER — HOSPITAL ENCOUNTER (OUTPATIENT)
Dept: OCCUPATIONAL THERAPY | Facility: OTHER | Age: 32
Setting detail: THERAPIES SERIES
Discharge: HOME | End: 2022-12-19
Attending: FAMILY MEDICINE
Payer: COMMERCIAL

## 2022-12-19 DIAGNOSIS — S62.341D CLOSED NONDISPLACED FRACTURE OF BASE OF SECOND METACARPAL BONE OF LEFT HAND WITH ROUTINE HEALING, SUBSEQUENT ENCOUNTER: ICD-10-CM

## 2022-12-19 PROCEDURE — 97035 APP MDLTY 1+ULTRASOUND EA 15: CPT | Mod: GO

## 2022-12-19 PROCEDURE — 97110 THERAPEUTIC EXERCISES: CPT | Mod: GO

## 2022-12-19 PROCEDURE — 97140 MANUAL THERAPY 1/> REGIONS: CPT | Mod: GO

## 2022-12-19 PROCEDURE — 97166 OT EVAL MOD COMPLEX 45 MIN: CPT | Mod: GO
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
## 2022-12-29 ENCOUNTER — HOSPITAL ENCOUNTER (OUTPATIENT)
Dept: OCCUPATIONAL THERAPY | Facility: OTHER | Age: 32
Setting detail: THERAPIES SERIES
Discharge: HOME | End: 2022-12-29
Attending: FAMILY MEDICINE
Payer: COMMERCIAL

## 2022-12-29 PROCEDURE — 97140 MANUAL THERAPY 1/> REGIONS: CPT | Mod: GO

## 2022-12-29 PROCEDURE — 97110 THERAPEUTIC EXERCISES: CPT | Mod: GO

## 2023-01-02 ENCOUNTER — HOSPITAL ENCOUNTER (OUTPATIENT)
Dept: OCCUPATIONAL THERAPY | Facility: OTHER | Age: 33
Setting detail: THERAPIES SERIES
Discharge: HOME | End: 2023-01-02
Attending: FAMILY MEDICINE
Payer: COMMERCIAL

## 2023-01-02 PROCEDURE — 97110 THERAPEUTIC EXERCISES: CPT | Mod: GO | Performed by: OCCUPATIONAL THERAPIST

## 2023-01-02 PROCEDURE — 97140 MANUAL THERAPY 1/> REGIONS: CPT | Mod: GO | Performed by: OCCUPATIONAL THERAPIST

## 2023-01-02 PROCEDURE — 97035 APP MDLTY 1+ULTRASOUND EA 15: CPT | Mod: GO | Performed by: OCCUPATIONAL THERAPIST

## 2023-01-05 ENCOUNTER — HOSPITAL ENCOUNTER (OUTPATIENT)
Dept: OCCUPATIONAL THERAPY | Facility: OTHER | Age: 33
Setting detail: THERAPIES SERIES
Discharge: HOME | End: 2023-01-05
Attending: FAMILY MEDICINE
Payer: COMMERCIAL

## 2023-01-05 PROCEDURE — 97110 THERAPEUTIC EXERCISES: CPT | Mod: GO

## 2023-01-05 PROCEDURE — 97140 MANUAL THERAPY 1/> REGIONS: CPT | Mod: GO

## 2023-01-19 ENCOUNTER — MYC MEDICAL ADVICE (OUTPATIENT)
Dept: FAMILY MEDICINE | Facility: OTHER | Age: 33
End: 2023-01-19
Payer: COMMERCIAL

## 2023-01-19 PROBLEM — Z72.0 TOBACCO ABUSE: Status: ACTIVE | Noted: 2023-01-19

## 2023-01-19 ASSESSMENT — ASTHMA QUESTIONNAIRES
QUESTION_4 LAST FOUR WEEKS HOW OFTEN HAVE YOU USED YOUR RESCUE INHALER OR NEBULIZER MEDICATION (SUCH AS ALBUTEROL): ONE OR TWO TIMES PER DAY
QUESTION_1 LAST FOUR WEEKS HOW MUCH OF THE TIME DID YOUR ASTHMA KEEP YOU FROM GETTING AS MUCH DONE AT WORK, SCHOOL OR AT HOME: MOST OF THE TIME
QUESTION_2 LAST FOUR WEEKS HOW OFTEN HAVE YOU HAD SHORTNESS OF BREATH: ONCE A DAY
ACT_TOTALSCORE: 8
QUESTION_3 LAST FOUR WEEKS HOW OFTEN DID YOUR ASTHMA SYMPTOMS (WHEEZING, COUGHING, SHORTNESS OF BREATH, CHEST TIGHTNESS OR PAIN) WAKE YOU UP AT NIGHT OR EARLIER THAN USUAL IN THE MORNING: FOUR OR MORE NIGHTS A WEEK
ACT_TOTALSCORE: 8
EMERGENCY_ROOM_LAST_YEAR_TOTAL: ONE
QUESTION_5 LAST FOUR WEEKS HOW WOULD YOU RATE YOUR ASTHMA CONTROL: NOT CONTROLLED AT ALL

## 2023-01-19 NOTE — PROGRESS NOTES
SUBJECTIVE:   CC: Yohana is an 32 year old who presents for preventive health visit.     History of Present Illness       Reason for visit:  Pervent care an asthma problems plus heart  Symptom onset:  More than a month  Symptoms include:  Tired,pain in cheast leg arm, shortness of breath, headache, dizzy ness  Symptom intensity:  Severe  Symptom progression:  Staying the same  Had these symptoms before:  Yes  Has tried/received treatment for these symptoms:  Yes  Previous treatment was successful:  No  What makes it worse:  Moving doing stuff  What makes it better:  Rest    She eats 0-1 servings of fruits and vegetables daily.She consumes 4 sweetened beverage(s) daily.She exercises with enough effort to increase her heart rate 10 to 19 minutes per day.  She exercises with enough effort to increase her heart rate 3 or less days per week. She is missing 2 dose(s) of medications per week.  She is not taking prescribed medications regularly due to remembering to take.    Here for annual physical. Concerns as below.     Asthma:   Patient reports a longstanding history of asthma that for many years had been well controlled.  Currently only using albuterol inhaler as needed.  Does report that she previously had been on a daily controller medication but that is been several years ago.  More recently she has been noticing increased in frequency and severity of asthma symptoms.  For the last several months she has been noticing on and off difficulties with shortness of breath, associated chest pains, fatigue, arm pains.  Reports last Saturday she had a significant asthma attack.  Admits she was drinking quite a bit, had an episode of shortness of breath and chest pain. Subsequent EMS was called but patient declined transfer to ED/hospital for additional evaluation.  Symptoms have improved.  She is using albuterol inhaler as needed which does seem to improve her symptoms.    RA:  Patient follows closely with rheumatology  for management of ankylosing spondylosis, rheumatoid arthritis.  Continues on sulfasalazine.       Contraception: Hysterectomy  Risk for STI?: No  Last pap: 05/2018, NIL, s/p hysterectomy  Any hx of abnormal paps:  No  Cholesterol/DM concerns/screening: Due  Tobacco?: Vaping  Calcium intake: Dietary  DEXA: Not indicated based on patient age and health status.   Last mammo: Not indicated based on patient age and health status.   Colonoscopy: Not indicated based on patient age and health status.   Hepatitis C screen: Low risk, declines  HIV screen: Low risk, declines  Immunizations: Influenza, pneumonia- declines        Today's PHQ-2 Score:   PHQ-2 ( 1999 [employer]) 1/20/2023   Q1: Little interest or pleasure in doing things 1   Q2: Feeling down, depressed or hopeless 1   PHQ-2 Score 2   PHQ-2 Total Score (12-17 Years)- Positive if 3 or more points; Administer PHQ-A if positive -   Q1: Little interest or pleasure in doing things Several days   Q2: Feeling down, depressed or hopeless Several days   PHQ-2 Score 2         Social History     Tobacco Use     Smoking status: Former     Types: Vaping Device     Smokeless tobacco: Never     Tobacco comments:     using e-cig.. approx 25 mg daily   Substance Use Topics     Alcohol use: Yes     Alcohol/week: 21.0 standard drinks     Comment: couple of brandys a night         Reviewed orders with patient.  Reviewed health maintenance and updated orders accordingly - Yes      Breast Cancer Screening:    FHS-7: No flowsheet data found.    Patient under 40 years of age: Routine Mammogram Screening not recommended.   Pertinent mammograms are reviewed under the imaging tab.    History of abnormal Pap smear: Last 3 Pap and HPV Results:       Reviewed and updated as needed this visit by clinical staff   Tobacco  Allergies  Meds  Problems  Med Hx  Surg Hx  Fam Hx  Soc   Hx        Reviewed and updated as needed this visit by Provider   Tobacco  Allergies  Meds  Problems  Med Hx   "Surg Hx  Fam Hx         Past Medical History:   Diagnosis Date     Bunion of great toe of left foot     No Comments Provided     Endometriosis 2018    diagnosed laparoscopically      Past Surgical History:   Procedure Laterality Date     ARTHROSCOPY ANKLE, OPEN REPAIR LIGAMENT, COMBINED Right 2020    Procedure: Posterior ARTHROSCOPY, ANKLE, WITH OPEN REPAIR OF ANKLE LIGAMENT, resection of os trigonum, FHL tenosyonovectomy;  Surgeon: Sean Salcedo DPM;  Location: [facility] OR     BUNIONECTOMY      2005,Left     COLONOSCOPY      2013,Normal     CYSTOSCOPY N/A 2018    Procedure: CYSTOSCOPY;;  Surgeon: Renate Ch MD;  Location:  OR     DENTAL SURGERY      wisdom teeth     FRACTURE SURGERY      ,L Foot-tarsometatarsal realignment arthrodesis with plate and screw fixation, capsule tendon balancing procedures about the first tarsometatarsal joint, Landry Gibson DPM  [facility]     hardware removal Left     2006,2054,REMOVAL OF FOREIGN BODY,Removal of harware L foot after bunionectomy [Other][[[[     HYSTERECTOMY, PAP NO LONGER INDICATED       LAPAROSCOPIC HYSTERECTOMY TOTAL, SALPINGECTOMY N/A 2018    Procedure: LAPAROSCOPIC HYSTERECTOMY TOTAL, SALPINGECTOMY;  Total Laparoscopic Hysterectomy & Bilateral Salpingectomy, Cystoscopy.;  Surgeon: Renate Ch MD;  Location:  OR     TONSILLECTOMY      2005     OB History    Para Term  AB Living   4 2 2 0 2 2   SAB IAB Ectopic Multiple Live Births   2 0 0 0 2      # Outcome Date GA Lbr Gopal/2nd Weight Sex Delivery Anes PTL Lv   4 SAB            3 SAB            2 Term    2.722 kg (6 lb) F    AMY   1 Term    4.706 kg (10 lb 6 oz) M    AMY       Review of Systems  Per HPI       OBJECTIVE:   /74   Pulse 71   Temp 97.5  F (36.4  C)   Resp 12   Ht 1.651 m (5' 5\")   Wt 81.5 kg (179 lb 9.6 oz)   LMP 2018 (LMP Unknown)   SpO2 100%   BMI 29.89 kg/m    Physical Exam  GENERAL: " healthy, alert and no distress  EYES: Eyes grossly normal to inspection, PERRL and conjunctivae and sclerae normal  HENT: ear canals and TM's normal, nose and mouth without ulcers or lesions  NECK: no adenopathy, no asymmetry, masses, or scars and thyroid normal to palpation  RESP: lungs clear to auscultation - no rales, rhonchi or wheezes  CV: regular rate and rhythm, normal S1 S2, no S3 or S4, no murmur, click or rub, no peripheral edema and peripheral pulses strong  MS: no gross musculoskeletal defects noted, no edema  SKIN: no suspicious lesions or rashes  NEURO: Normal strength and tone, mentation intact and speech normal  PSYCH: mentation appears normal, affect normal/bright    Results for orders placed or performed in visit on 01/20/23   Hemoglobin A1c     Status: Normal   Result Value Ref Range    Hemoglobin A1C 4.8 4.0 - 6.2 %   Lipid Panel     Status: Abnormal   Result Value Ref Range    Cholesterol 204 (H) <200 mg/dL    Triglycerides 112 <150 mg/dL    Direct Measure HDL 63 >=50 mg/dL    LDL Cholesterol Calculated 119 (H) <=100 mg/dL    Non HDL Cholesterol 141 (H) <130 mg/dL    Narrative    Cholesterol  Desirable:  <200 mg/dL    Triglycerides  Normal:  Less than 150 mg/dL  Borderline High:  150-199 mg/dL  High:  200-499 mg/dL  Very High:  Greater than or equal to 500 mg/dL    Direct Measure HDL  Female:  Greater than or equal to 50 mg/dL   Male:  Greater than or equal to 40 mg/dL    LDL Cholesterol  Desirable:  <100mg/dL  Above Desirable:  100-129 mg/dL   Borderline High:  130-159 mg/dL   High:  160-189 mg/dL   Very High:  >= 190 mg/dL    Non HDL Cholesterol  Desirable:  130 mg/dL  Above Desirable:  130-159 mg/dL  Borderline High:  160-189 mg/dL  High:  190-219 mg/dL  Very High:  Greater than or equal to 220 mg/dL   Comprehensive Metabolic Panel     Status: Normal   Result Value Ref Range    Sodium 141 136 - 145 mmol/L    Potassium 4.2 3.4 - 5.3 mmol/L    Chloride 102 98 - 107 mmol/L    Carbon Dioxide  (CO2) 26 22 - 29 mmol/L    Anion Gap 13 7 - 15 mmol/L    Urea Nitrogen 8.2 6.0 - 20.0 mg/dL    Creatinine 0.70 0.51 - 0.95 mg/dL    Calcium 9.5 8.6 - 10.0 mg/dL    Glucose 87 70 - 99 mg/dL    Alkaline Phosphatase 68 35 - 104 U/L    AST 26 10 - 35 U/L    ALT 26 10 - 35 U/L    Protein Total 7.3 6.4 - 8.3 g/dL    Albumin 4.7 3.5 - 5.2 g/dL    Bilirubin Total 0.4 <=1.2 mg/dL    GFR Estimate >90 >60 mL/min/1.73m2   CBC with platelets and differential     Status: None   Result Value Ref Range    WBC Count 7.1 4.0 - 11.0 10e3/uL    RBC Count 4.44 3.80 - 5.20 10e6/uL    Hemoglobin 14.4 11.7 - 15.7 g/dL    Hematocrit 42.5 35.0 - 47.0 %    MCV 96 78 - 100 fL    MCH 32.4 26.5 - 33.0 pg    MCHC 33.9 31.5 - 36.5 g/dL    RDW 12.4 10.0 - 15.0 %    Platelet Count 272 150 - 450 10e3/uL    % Neutrophils 70 %    % Lymphocytes 22 %    % Monocytes 7 %    % Eosinophils 0 %    % Basophils 1 %    % Immature Granulocytes 0 %    NRBCs per 100 WBC 0 <1 /100    Absolute Neutrophils 4.9 1.6 - 8.3 10e3/uL    Absolute Lymphocytes 1.5 0.8 - 5.3 10e3/uL    Absolute Monocytes 0.5 0.0 - 1.3 10e3/uL    Absolute Eosinophils 0.0 0.0 - 0.7 10e3/uL    Absolute Basophils 0.1 0.0 - 0.2 10e3/uL    Absolute Immature Granulocytes 0.0 <=0.4 10e3/uL    Absolute NRBCs 0.0 10e3/uL   EKG 12-lead, tracing only     Status: None   Result Value Ref Range    Systolic Blood Pressure  mmHg    Diastolic Blood Pressure  mmHg    Ventricular Rate 59 BPM    Atrial Rate 59 BPM    ME Interval 144 ms    QRS Duration 88 ms     ms    QTc 413 ms    P Axis 67 degrees    R AXIS 79 degrees    T Axis 67 degrees    Interpretation ECG       Sinus bradycardia  Otherwise normal ECG  No previous ECGs available  Confirmed by Chai Michelle ([id number]) on 1/20/2023 2:42:49 PM     CBC and Differential     Status: None    Narrative    The following orders were created for panel order CBC and Differential.  Procedure                               Abnormality         Status                    "  ---------                               -----------         ------                     CBC with platelets and d...[[id number]]                      Final result                 Please view results for these tests on the individual orders.         ASSESSMENT/PLAN:       ICD-10-CM    1. Routine history and physical examination of adult  Z00.00 CBC and Differential     Comprehensive Metabolic Panel     Lipid Panel     Hemoglobin A1c     Hemoglobin A1c     Lipid Panel     Comprehensive Metabolic Panel     CBC and Differential      2. Chest pain, unspecified type  R07.9 EKG 12-lead, tracing only      3. Moderate persistent asthma without complication  J45.40 fluticasone (FLOVENT HFA) 110 MCG/ACT inhaler        1.  Pap smear no longer indicated due to status post hysterectomy, otherwise up-to-date on preventative exams.  Lab work stable as above.  Declines updating immunizations.  Encouraged healthy lifestyle.    2, 3.  Differential includes related to poorly controlled asthma, underlying cardiac cause, musculoskeletal cause, related to mental health, etc.  Lab work stable, EKG stable showing sinus bradycardia, otherwise unremarkable.  Described symptoms and exam consistent with progression of asthma.  Recommend Flovent for daily controller inhaler in addition to as needed albuterol.  If minimal improvement can consider trying combination inhaler such as Advair.  Follow-up for recheck in 4 weeks or sooner if needed.  Avoid asthma triggers, treat allergy symptoms if needed.        Patient has been advised of split billing requirements and indicates understanding: Yes      COUNSELING:  Reviewed preventive health counseling, as reflected in patient instructions      BMI:   Estimated body mass index is 29.89 kg/m  as calculated from the following:    Height as of this encounter: 1.651 m (5' 5\").    Weight as of this encounter: 81.5 kg (179 lb 9.6 oz).   Encouraged healthy lifestyle      She reports that she has quit smoking. " Her smoking use included vaping device. She has never used smokeless tobacco.      Andie Topete PA-C  [facility]

## 2023-01-20 ENCOUNTER — OFFICE VISIT (OUTPATIENT)
Dept: FAMILY MEDICINE | Facility: OTHER | Age: 33
End: 2023-01-20
Attending: PHYSICIAN ASSISTANT
Payer: COMMERCIAL

## 2023-01-20 VITALS
TEMPERATURE: 97.5 F | DIASTOLIC BLOOD PRESSURE: 74 MMHG | OXYGEN SATURATION: 100 % | SYSTOLIC BLOOD PRESSURE: 122 MMHG | HEIGHT: 65 IN | HEART RATE: 71 BPM | WEIGHT: 179.6 LBS | BODY MASS INDEX: 29.92 KG/M2 | RESPIRATION RATE: 12 BRPM

## 2023-01-20 DIAGNOSIS — J45.40 MODERATE PERSISTENT ASTHMA WITHOUT COMPLICATION: ICD-10-CM

## 2023-01-20 DIAGNOSIS — Z00.00 ROUTINE HISTORY AND PHYSICAL EXAMINATION OF ADULT: Primary | ICD-10-CM

## 2023-01-20 DIAGNOSIS — R07.9 CHEST PAIN, UNSPECIFIED TYPE: ICD-10-CM

## 2023-01-20 LAB
ALBUMIN SERPL BCG-MCNC: 4.7 G/DL (ref 3.5–5.2)
ALP SERPL-CCNC: 68 U/L (ref 35–104)
ALT SERPL W P-5'-P-CCNC: 26 U/L (ref 10–35)
ANION GAP SERPL CALCULATED.3IONS-SCNC: 13 MMOL/L (ref 7–15)
AST SERPL W P-5'-P-CCNC: 26 U/L (ref 10–35)
ATRIAL RATE - MUSE: 59 BPM
BASOPHILS # BLD AUTO: 0.1 10E3/UL (ref 0–0.2)
BASOPHILS NFR BLD AUTO: 1 %
BILIRUB SERPL-MCNC: 0.4 MG/DL
BUN SERPL-MCNC: 8.2 MG/DL (ref 6–20)
CALCIUM SERPL-MCNC: 9.5 MG/DL (ref 8.6–10)
CHLORIDE SERPL-SCNC: 102 MMOL/L (ref 98–107)
CHOLEST SERPL-MCNC: 204 MG/DL
CREAT SERPL-MCNC: 0.7 MG/DL (ref 0.51–0.95)
DEPRECATED HCO3 PLAS-SCNC: 26 MMOL/L (ref 22–29)
DIASTOLIC BLOOD PRESSURE - MUSE: NORMAL MMHG
EOSINOPHIL # BLD AUTO: 0 10E3/UL (ref 0–0.7)
EOSINOPHIL NFR BLD AUTO: 0 %
ERYTHROCYTE [DISTWIDTH] IN BLOOD BY AUTOMATED COUNT: 12.4 % (ref 10–15)
GFR SERPL CREATININE-BSD FRML MDRD: >90 ML/MIN/1.73M2
GLUCOSE SERPL-MCNC: 87 MG/DL (ref 70–99)
HBA1C MFR BLD: 4.8 % (ref 4–6.2)
HCT VFR BLD AUTO: 42.5 % (ref 35–47)
HDLC SERPL-MCNC: 63 MG/DL
HGB BLD-MCNC: 14.4 G/DL (ref 11.7–15.7)
IMM GRANULOCYTES # BLD: 0 10E3/UL
IMM GRANULOCYTES NFR BLD: 0 %
INTERPRETATION ECG - MUSE: NORMAL
LDLC SERPL CALC-MCNC: 119 MG/DL
LYMPHOCYTES # BLD AUTO: 1.5 10E3/UL (ref 0.8–5.3)
LYMPHOCYTES NFR BLD AUTO: 22 %
MCH RBC QN AUTO: 32.4 PG (ref 26.5–33)
MCHC RBC AUTO-ENTMCNC: 33.9 G/DL (ref 31.5–36.5)
MCV RBC AUTO: 96 FL (ref 78–100)
MONOCYTES # BLD AUTO: 0.5 10E3/UL (ref 0–1.3)
MONOCYTES NFR BLD AUTO: 7 %
NEUTROPHILS # BLD AUTO: 4.9 10E3/UL (ref 1.6–8.3)
NEUTROPHILS NFR BLD AUTO: 70 %
NONHDLC SERPL-MCNC: 141 MG/DL
NRBC # BLD AUTO: 0 10E3/UL
NRBC BLD AUTO-RTO: 0 /100
P AXIS - MUSE: 67 DEGREES
PLATELET # BLD AUTO: 272 10E3/UL (ref 150–450)
POTASSIUM SERPL-SCNC: 4.2 MMOL/L (ref 3.4–5.3)
PR INTERVAL - MUSE: 144 MS
PROT SERPL-MCNC: 7.3 G/DL (ref 6.4–8.3)
QRS DURATION - MUSE: 88 MS
QT - MUSE: 418 MS
QTC - MUSE: 413 MS
R AXIS - MUSE: 79 DEGREES
RBC # BLD AUTO: 4.44 10E6/UL (ref 3.8–5.2)
SODIUM SERPL-SCNC: 141 MMOL/L (ref 136–145)
SYSTOLIC BLOOD PRESSURE - MUSE: NORMAL MMHG
T AXIS - MUSE: 67 DEGREES
TRIGL SERPL-MCNC: 112 MG/DL
VENTRICULAR RATE- MUSE: 59 BPM
WBC # BLD AUTO: 7.1 10E3/UL (ref 4–11)

## 2023-01-20 PROCEDURE — 83036 HEMOGLOBIN GLYCOSYLATED A1C: CPT | Mod: ZL | Performed by: PHYSICIAN ASSISTANT

## 2023-01-20 PROCEDURE — 93010 ELECTROCARDIOGRAM REPORT: CPT | Performed by: STUDENT IN AN ORGANIZED HEALTH CARE EDUCATION/TRAINING PROGRAM

## 2023-01-20 PROCEDURE — 36415 COLL VENOUS BLD VENIPUNCTURE: CPT | Mod: ZL | Performed by: PHYSICIAN ASSISTANT

## 2023-01-20 PROCEDURE — 93005 ELECTROCARDIOGRAM TRACING: CPT | Performed by: PHYSICIAN ASSISTANT

## 2023-01-20 PROCEDURE — 99395 PREV VISIT EST AGE 18-39: CPT | Performed by: PHYSICIAN ASSISTANT

## 2023-01-20 PROCEDURE — 80053 COMPREHEN METABOLIC PANEL: CPT | Mod: ZL | Performed by: PHYSICIAN ASSISTANT

## 2023-01-20 PROCEDURE — G0463 HOSPITAL OUTPT CLINIC VISIT: HCPCS

## 2023-01-20 PROCEDURE — 85025 COMPLETE CBC W/AUTO DIFF WBC: CPT | Mod: ZL | Performed by: PHYSICIAN ASSISTANT

## 2023-01-20 PROCEDURE — 80061 LIPID PANEL: CPT | Mod: ZL | Performed by: PHYSICIAN ASSISTANT

## 2023-01-20 PROCEDURE — 99213 OFFICE O/P EST LOW 20 MIN: CPT | Mod: 25 | Performed by: PHYSICIAN ASSISTANT

## 2023-01-20 ASSESSMENT — PAIN SCALES - GENERAL: PAINLEVEL: MILD PAIN (2)

## 2023-01-20 NOTE — NURSING NOTE
Patient presents to clinic for annual physical.  Tiera Orourke LPN ....................  1/20/2023   8:52 AM

## 2023-01-20 NOTE — NURSING NOTE
Patient presents to clinic for annual physical.  Tiera Orourke LPN ....................  1/20/2023   8:57 AM

## 2023-01-21 RX ORDER — FLUTICASONE PROPIONATE 110 UG/1
1 AEROSOL, METERED RESPIRATORY (INHALATION) 2 TIMES DAILY
Qty: 12 G | Refills: 1 | Status: SHIPPED | OUTPATIENT
Start: 2023-01-21 | End: 2024-07-31

## 2023-02-06 ENCOUNTER — MYC MEDICAL ADVICE (OUTPATIENT)
Dept: FAMILY MEDICINE | Facility: OTHER | Age: 33
End: 2023-02-06
Payer: COMMERCIAL

## 2023-02-06 DIAGNOSIS — N63.10 MASS OF RIGHT BREAST, UNSPECIFIED QUADRANT: Primary | ICD-10-CM

## 2023-02-06 DIAGNOSIS — M54.41 ACUTE RIGHT-SIDED LOW BACK PAIN WITH RIGHT-SIDED SCIATICA: ICD-10-CM

## 2023-02-07 RX ORDER — IBUPROFEN 600 MG/1
TABLET, FILM COATED ORAL
Qty: 270 TABLET | Refills: 0 | OUTPATIENT
Start: 2023-02-07

## 2023-02-07 RX ORDER — IBUPROFEN 600 MG/1
600 TABLET, FILM COATED ORAL EVERY 8 HOURS PRN
Qty: 90 TABLET | Refills: 1 | Status: SHIPPED | OUTPATIENT
Start: 2023-02-07

## 2023-02-08 ENCOUNTER — HOSPITAL ENCOUNTER (OUTPATIENT)
Dept: PHYSICAL THERAPY | Facility: OTHER | Age: 33
Setting detail: THERAPIES SERIES
Discharge: HOME | End: 2023-02-08
Attending: INTERNAL MEDICINE
Payer: COMMERCIAL

## 2023-02-08 DIAGNOSIS — M45.9 ANKYLOSING SPONDYLITIS (H): ICD-10-CM

## 2023-02-08 PROCEDURE — 97110 THERAPEUTIC EXERCISES: CPT | Mod: GP | Performed by: PHYSICAL THERAPIST

## 2023-02-08 PROCEDURE — 97162 PT EVAL MOD COMPLEX 30 MIN: CPT | Mod: GP | Performed by: PHYSICAL THERAPIST

## 2023-02-09 NOTE — PROGRESS NOTES
[facility]    OUTPATIENT PHYSICAL THERAPY ORTHOPEDIC EVALUATION  PLAN OF TREATMENT FOR OUTPATIENT REHABILITATION  (COMPLETE FOR INITIAL CLAIMS ONLY)  Patient's Last Name, First Name, M.I.  YOB: 1990  Yohana Robertson    Provider s Name:  [facility]   Medical Record No.  [id number]   Start of Care Date:  02/08/23   Onset Date:      Type:     _X__PT   ___OT   ___SLP Medical Diagnosis:  Ankylosing spondylitis (H) (M45.9)     PT Diagnosis:  Left rotator cuff strain thoracic spine immobility lumbar spine and SI strain   Visits from SOC:  1      _________________________________________________________________________________  Plan of Treatment/Functional Goals:  joint mobilization, manual therapy, neuromuscular re-education, ROM, strengthening, transfer training     Electrical stimulation, Hot packs, Cryotherapy, Ultrasound     Goals  Goal Identifier: Home exercise program  Goal Description: Patient will maintain home exercise program at least 3 times a week to improve range of motion strength and decrease pain  Target Date: 05/09/23    Goal Identifier: Posture  Goal Description: Patient will demonstrate proper lifting and bending mechanics and posture to reduce strain on lumbar spine and improve tolerance of household tasks and chores  Target Date: 05/09/23    Goal Identifier: Strength  Goal Description: Rotator cuff strength will improve to 4+ out of 5 and core strength and hip strength 4+ out of 5 to better support and reduce strain on injured areas  Target Date: 05/09/23                 Therapy Frequency:  2 times/Week  Predicted Duration of Therapy Intervention:  12 weeks    Orion Thomson, PT                 I CERTIFY THE NEED FOR THESE SERVICES FURNISHED UNDER        THIS PLAN OF TREATMENT AND WHILE UNDER MY CARE .             Physician Signature                Date    X_____________________________________________________                         Certification Date From:  02/08/23   Certification Date To:  05/09/23    Referring Provider:  Cristo Benitez    Initial Assessment        See Epic Evaluation Start of Care Date: 02/08/23

## 2023-02-09 NOTE — INITIAL ASSESSMENTS
02/08/23 1200   General Information   Type of Visit Initial OP Ortho PT Evaluation   Start of Care Date 02/08/23   Referring Physician Cristo Benitez   Patient/Family Goals Statement get back into spine program and then stay on top of her HEP to reduce flare ups.   Orders Evaluate and Treat   Certification Required? Yes   Medical Diagnosis Ankylosing spondylitis (H) (M45.9)   Surgical/Medical history reviewed Yes   Precautions/Limitations spinal precautions   Body Part(s)   Body Part(s) Lumbar Spine/SI;Shoulder   Presentation and Etiology   Pertinent history of current problem (include personal factors and/or comorbidities that impact the POC) Patient presents to physical therapy with lower back neck and shoulder pain bilaterally.  She reports pain swelling loss of movement stiffness loss of strength trouble with balance walking and report of sensation of burning numbness and tingling.  She regularly gets headaches and migraines as well.  This has been reoccurring over multiple years.  She had physical therapy spine program in the past the year 2021 and it was helpful.  She over the last couple years decreased her tolerance and regularity of her maintenance home exercise program.  Dr. Benitez suggested return to physical therapy and eventually into the spine program as well.  Her neck regularly feels stiff and gets migraines or headaches at least 2 times a week at times the migraines are significant enough to cause her to vomit.  She has bilateral shoulder pain with popping and cracking tightness with movement right is worse than left.  Overall pain is rated between 4 and 8 out of 10 described as sharp aching burning shooting and cramping.  She has some sort of discomfort all the time but made worse with walking lifting and carrying household tasks.  Other things that bother her are lifting bending and reaching overhead.  She has occasional significant amount of pain that causes her to have lumbar spasms and  changing position is very difficult.  Pain is improved with rest changing positions while sleeping or sitting hot packs and cold packs and utilizing a lumbar support brace at times.  She is currently unemployed but does a lot of house chores as she is a single mother.  She has difficulty with bending and twisting especially doing laundry and reaching up overhead to put dishes into cabinetry.  She enjoys hobbies in the outdoors of fishing hiking camping and riding bike.   Fall Risk Screen   Fall screen completed by PT   Is patient a fall risk? No   Abuse Screen (yes response referral indicated)   Feels Unsafe at Home or Work/School no   Feels Threatened by Someone no   Does Anyone Try to Keep You From Having Contact with Others or Doing Things Outside Your Home? no   Physical Signs of Abuse Present no   Patient needs abuse support services and resources No   Lumbar Spine/SI Objective Findings   Gait/Locomotion Normal   Flexion ROM Within normal   Extension ROM Mildly painful   Repeated Extension-Standing ROM No change   Repeated Flexion-Standing ROM Slight increase in pain   Pelvic Screen No noted alignment issues today   Hip Screen Clear   Lumbar/Hip/Knee/Foot Strength Comments Weakness in her core and glutes 4 out of  5   SLR Negative   Prone Instability Test Tight thoracic spine fairly well moving lumbar spine   Spring Test Positive   Palpation Very tender SI joints bilaterally.   Slump Test Negative   Observation tight traps and slightly rounded shoulders.   Shoulder Objective Findings   Side (if bilateral, select both right and left) Right;Left   Cervical Screen (ROM, quadrant) Good range of motion in all planes   Shoulder ROM Comment Pain with left full range of motion flexion and behind the back reach   Shoulder Flexibility Comments Good scapular mobility weakness and mild pain 4 out of 5 with left supraspinatus and infraspinatus.   Hallman-Lobito Test Positive   Dallam's Test Negative   Palpation Tender and  painful supra and infraspinatus of the left shoulder   Posture Slightly rounded shoulders   Planned Therapy Interventions   Planned Therapy Interventions joint mobilization;manual therapy;neuromuscular re-education;ROM;strengthening;transfer training   Planned Modality Interventions   Planned Modality Interventions Electrical stimulation;Hot packs;Cryotherapy;Ultrasound   Clinical Impression   Criteria for Skilled Therapeutic Interventions Met yes, treatment indicated   PT Diagnosis Left rotator cuff strain thoracic spine immobility lumbar spine and SI strain   Influenced by the following impairments pain   Functional limitations due to impairments house hold chores   Clinical Presentation Evolving/Changing   Clinical Presentation Rationale A.S.   Clinical Decision Making (Complexity) Moderate complexity   Therapy Frequency 2 times/Week   Predicted Duration of Therapy Intervention (days/wks) 12 weeks   Risk & Benefits of therapy have been explained Yes   Patient, Family & other staff in agreement with plan of care Yes   Clinical Impression Comments Physical therapy will work on shoulder strength and posture as well as lumbar spine and core stability and hip strength to improve core control and reduce lumbar spine strain.   ORTHO GOALS   PT Ortho Eval Goals 1;2;3   Ortho Goal 1   Goal Identifier Home exercise program   Goal Description Patient will maintain home exercise program at least 3 times a week to improve range of motion strength and decrease pain   Target Date 05/09/23   Ortho Goal 2   Goal Identifier Posture   Goal Description Patient will demonstrate proper lifting and bending mechanics and posture to reduce strain on lumbar spine and improve tolerance of household tasks and chores   Target Date 05/09/23   Ortho Goal 3   Goal Identifier Strength   Goal Description Rotator cuff strength will improve to 4+ out of 5 and core strength and hip strength 4+ out of 5 to better support and reduce strain on injured  areas   Target Date 05/09/23   Total Evaluation Time   PT Eval, Moderate Complexity Minutes (76797) 40   Therapy Certification   Certification date from 02/08/23   Certification date to 05/09/23   Medical Diagnosis Ankylosing spondylitis (H) (M45.9)

## 2023-02-10 ENCOUNTER — HOSPITAL ENCOUNTER (OUTPATIENT)
Dept: PHYSICAL THERAPY | Facility: OTHER | Age: 33
Setting detail: THERAPIES SERIES
Discharge: HOME | End: 2023-02-10
Attending: INTERNAL MEDICINE
Payer: COMMERCIAL

## 2023-02-10 PROCEDURE — 97110 THERAPEUTIC EXERCISES: CPT | Mod: GP | Performed by: PHYSICAL THERAPIST

## 2023-02-10 PROCEDURE — 97140 MANUAL THERAPY 1/> REGIONS: CPT | Mod: GP | Performed by: PHYSICAL THERAPIST

## 2023-02-14 ENCOUNTER — HOSPITAL ENCOUNTER (OUTPATIENT)
Dept: PHYSICAL THERAPY | Facility: OTHER | Age: 33
Setting detail: THERAPIES SERIES
Discharge: HOME | End: 2023-02-14
Attending: INTERNAL MEDICINE
Payer: COMMERCIAL

## 2023-02-14 PROCEDURE — 97110 THERAPEUTIC EXERCISES: CPT | Mod: GP | Performed by: PHYSICAL THERAPIST

## 2023-02-14 PROCEDURE — 97140 MANUAL THERAPY 1/> REGIONS: CPT | Mod: GP | Performed by: PHYSICAL THERAPIST

## 2023-02-15 ENCOUNTER — MYC MEDICAL ADVICE (OUTPATIENT)
Dept: FAMILY MEDICINE | Facility: OTHER | Age: 33
End: 2023-02-15
Payer: COMMERCIAL

## 2023-02-17 ENCOUNTER — HOSPITAL ENCOUNTER (OUTPATIENT)
Dept: PHYSICAL THERAPY | Facility: OTHER | Age: 33
Setting detail: THERAPIES SERIES
Discharge: HOME | End: 2023-02-17
Attending: INTERNAL MEDICINE
Payer: COMMERCIAL

## 2023-02-17 PROCEDURE — 97140 MANUAL THERAPY 1/> REGIONS: CPT | Mod: GP | Performed by: PHYSICAL THERAPIST

## 2023-02-17 PROCEDURE — 97110 THERAPEUTIC EXERCISES: CPT | Mod: GP | Performed by: PHYSICAL THERAPIST

## 2023-02-21 ENCOUNTER — HOSPITAL ENCOUNTER (OUTPATIENT)
Dept: PHYSICAL THERAPY | Facility: OTHER | Age: 33
Setting detail: THERAPIES SERIES
Discharge: HOME | End: 2023-02-21
Attending: INTERNAL MEDICINE
Payer: COMMERCIAL

## 2023-02-21 PROCEDURE — 97140 MANUAL THERAPY 1/> REGIONS: CPT | Mod: GP | Performed by: PHYSICAL THERAPIST

## 2023-02-21 PROCEDURE — 97110 THERAPEUTIC EXERCISES: CPT | Mod: GP | Performed by: PHYSICAL THERAPIST

## 2023-02-24 ENCOUNTER — HOSPITAL ENCOUNTER (OUTPATIENT)
Dept: PHYSICAL THERAPY | Facility: OTHER | Age: 33
Setting detail: THERAPIES SERIES
Discharge: HOME | End: 2023-02-24
Attending: INTERNAL MEDICINE
Payer: COMMERCIAL

## 2023-02-24 PROCEDURE — 97140 MANUAL THERAPY 1/> REGIONS: CPT | Mod: GP | Performed by: PHYSICAL THERAPIST

## 2023-02-24 PROCEDURE — 97110 THERAPEUTIC EXERCISES: CPT | Mod: GP | Performed by: PHYSICAL THERAPIST

## 2023-02-28 ENCOUNTER — HOSPITAL ENCOUNTER (OUTPATIENT)
Dept: PHYSICAL THERAPY | Facility: OTHER | Age: 33
Setting detail: THERAPIES SERIES
Discharge: HOME | End: 2023-02-28
Attending: INTERNAL MEDICINE
Payer: COMMERCIAL

## 2023-02-28 PROCEDURE — 97140 MANUAL THERAPY 1/> REGIONS: CPT | Mod: GP | Performed by: PHYSICAL THERAPIST

## 2023-02-28 PROCEDURE — 97110 THERAPEUTIC EXERCISES: CPT | Mod: GP | Performed by: PHYSICAL THERAPIST

## 2023-03-14 ENCOUNTER — HOSPITAL ENCOUNTER (OUTPATIENT)
Dept: PHYSICAL THERAPY | Facility: OTHER | Age: 33
Setting detail: THERAPIES SERIES
Discharge: HOME | End: 2023-03-14
Attending: INTERNAL MEDICINE
Payer: COMMERCIAL

## 2023-03-14 PROCEDURE — 97140 MANUAL THERAPY 1/> REGIONS: CPT | Mod: GP

## 2023-03-14 PROCEDURE — 97110 THERAPEUTIC EXERCISES: CPT | Mod: GP

## 2023-03-15 ENCOUNTER — HOSPITAL ENCOUNTER (OUTPATIENT)
Dept: ULTRASOUND IMAGING | Facility: OTHER | Age: 33
Discharge: HOME | End: 2023-03-15
Attending: PHYSICIAN ASSISTANT
Payer: COMMERCIAL

## 2023-03-15 ENCOUNTER — HOSPITAL ENCOUNTER (OUTPATIENT)
Dept: MAMMOGRAPHY | Facility: OTHER | Age: 33
Discharge: HOME | End: 2023-03-15
Attending: PHYSICIAN ASSISTANT
Payer: COMMERCIAL

## 2023-03-15 DIAGNOSIS — N63.10 MASS OF RIGHT BREAST, UNSPECIFIED QUADRANT: ICD-10-CM

## 2023-03-15 PROCEDURE — 76642 ULTRASOUND BREAST LIMITED: CPT | Mod: RT

## 2023-03-15 PROCEDURE — 77062 BREAST TOMOSYNTHESIS BI: CPT

## 2023-03-31 ENCOUNTER — HOSPITAL ENCOUNTER (OUTPATIENT)
Dept: PHYSICAL THERAPY | Facility: OTHER | Age: 33
Setting detail: THERAPIES SERIES
Discharge: HOME | End: 2023-03-31
Attending: INTERNAL MEDICINE
Payer: COMMERCIAL

## 2023-03-31 PROCEDURE — 97110 THERAPEUTIC EXERCISES: CPT | Mod: GP | Performed by: PHYSICAL THERAPIST

## 2023-03-31 PROCEDURE — 97140 MANUAL THERAPY 1/> REGIONS: CPT | Mod: GP | Performed by: PHYSICAL THERAPIST

## 2023-04-03 ENCOUNTER — HOSPITAL ENCOUNTER (OUTPATIENT)
Dept: PHYSICAL THERAPY | Facility: OTHER | Age: 33
Setting detail: THERAPIES SERIES
Discharge: HOME | End: 2023-04-03
Attending: INTERNAL MEDICINE
Payer: COMMERCIAL

## 2023-04-03 PROCEDURE — 97110 THERAPEUTIC EXERCISES: CPT | Mod: GP | Performed by: PHYSICAL THERAPIST

## 2023-04-03 PROCEDURE — 97140 MANUAL THERAPY 1/> REGIONS: CPT | Mod: GP | Performed by: PHYSICAL THERAPIST

## 2023-04-03 NOTE — PROGRESS NOTES
[facility]    Outpatient Physical Therapy Progress Note  Patient: Yohana Robertson  : 1990    Beginning/End Dates of Reporting Period:   to 4/3/23    Referring Provider: Cristo Benitez Diagnosis: Left rotator cuff strain thoracic spine immobility lumbar spine and SI strain     Client Self Report: Had a headache last night and this morning but doing better.  Eager to continue to work out and get stronger after being ill for couple weeks and feeling weak.    Goals:  Goal Identifier Home exercise program   Goal Description Patient will maintain home exercise program at least 3 times a week to improve range of motion strength and decrease pain   Target Date 23   Date Met      Progress (detail required for progress note): Patient states compliance     Goal Identifier Posture   Goal Description Patient will demonstrate proper lifting and bending mechanics and posture to reduce strain on lumbar spine and improve tolerance of household tasks and chores   Target Date 23   Date Met      Progress (detail required for progress note): Has improved with posture understanding and protecting her lumbar spine to decrease flareups.     Goal Identifier Strength   Goal Description Rotator cuff strength will improve to 4+ out of 5 and core strength and hip strength 4+ out of 5 to better support and reduce strain on injured areas   Target Date 23   Date Met      Progress (detail required for progress note): Overall improving strength but not met goal yet.           Plan:  Continue therapy per current plan of care.    Discharge:  No

## 2023-05-15 NOTE — PROGRESS NOTES
Assessment & Plan     1. Vaginal itching  Wet prep negative. Ok to start Humira as prescribed by rheumatologist.   - Wet Prep, Genital    2. Mild intermittent asthma without complication  Chronic, improved with Flovent. Refilled albuterol.   - albuterol (PROAIR HFA/PROVENTIL HFA/VENTOLIN HFA) 108 (90 Base) MCG/ACT inhaler; Inhale 2 puffs into the lungs every 4 hours as needed for shortness of breath or wheezing  Dispense: 18 g; Refill: 3    3. Fall, initial encounter  4. Left wrist pain  Left wrist x-ray showing not fully healed trapezium fracture from 10/2022. No new acute injury. If continuing to struggle with pain recommend orthopedic follow up.   - XR Wrist Left G/E 3 Views; Future    5. Right ankle pain, unspecified chronicity  X-ray negative for acute injury. Continue with symptomatic management. Follow up if symptoms persist or worsen.   - XR Ankle Right G/E 3 Views; Future       Return if symptoms worsen or fail to improve.    Andie Topete PA-C  [facility] is a 32 year old, presenting for the following health issues:  Musculoskeletal Problem      History of Present Illness       Reason for visit:  Brusing issue but think solved, having issue with right ankle and left had might have reinjured and id like to [facility] for a yeast infection make sure im clear to start humara    She eats 2-3 servings of fruits and vegetables daily.She consumes 6 sweetened beverage(s) daily.She exercises with enough effort to increase her heart rate 30 to 60 minutes per day.  She exercises with enough effort to increase her heart rate 7 days per week. She is missing 2 dose(s) of medications per week.  She is not taking prescribed medications regularly due to remembering to take.    Today's PHQ-9         PHQ-9 Total Score: 15    PHQ-9 Q9 Thoughts of better off dead/self-harm past 2 weeks :   Not at all    How difficult have these problems made it for you to do your work, take care  of things at home, or get along with other people: Somewhat difficult     Here for evaluation of multiple concerns as outlined below.     Vaginal itching:  Patient has been struggling with vaginal itching and some discharge changes more recently. Concerned for possible yeast infection and wanted to be checked as she is scheduled to start Humira this week for management of her RA and ankylosing spondylosis. Wants to ensure she does not have active infection prior to start.     Fall:  Slipped on deck at home a few weeks ago. Injured her left wrist and right ankle. Reports she had previously broken her right ankle and had surgery. At that time reports she had been walking on the fractured ankle for quite some time not knowing the extent of the injury. Has been struggling with persistent left hand and wrist pain as well. Did fracture her trapezium in 10/2022.     Asthma:  Restarted on daily controller medication in January. Noticed significant improvement. Needing refills of albuterol inhaler for prn use.     PAST MEDICAL HISTORY:   Past Medical History:   Diagnosis Date     Bunion of great toe of left foot     No Comments Provided     Endometriosis 07/2018    diagnosed laparoscopically       PAST SURGICAL HISTORY:   Past Surgical History:   Procedure Laterality Date     ARTHROSCOPY ANKLE, OPEN REPAIR LIGAMENT, COMBINED Right 08/06/2020    Procedure: Posterior ARTHROSCOPY, ANKLE, WITH OPEN REPAIR OF ANKLE LIGAMENT, resection of os trigonum, [facility] tenosyonovectomy;  Surgeon: Sean Salcedo DPM;  Location:  OR     BUNIONECTOMY      4/2005,Left     COLONOSCOPY      7/2013,Normal     CYSTOSCOPY N/A 07/26/2018    Procedure: CYSTOSCOPY;;  Surgeon: Renate Ch MD;  Location:  OR     DENTAL SURGERY      wisdom teeth     FRACTURE SURGERY      6/13,L Foot-tarsometatarsal realignment arthrodesis with plate and screw fixation, capsule tendon balancing procedures about the first tarsometatarsal joint, Landry Gibson DPM   [facility]     hardware removal Left     7/26/2006,[id number],REMOVAL OF FOREIGN BODY,Removal of harware L foot after bunionectomy [Other][[[[     HYSTERECTOMY, PAP NO LONGER INDICATED       LAPAROSCOPIC HYSTERECTOMY TOTAL, SALPINGECTOMY N/A 07/26/2018    Procedure: LAPAROSCOPIC HYSTERECTOMY TOTAL, SALPINGECTOMY;  Total Laparoscopic Hysterectomy & Bilateral Salpingectomy, Cystoscopy.;  Surgeon: Renate Ch MD;  Location: [facility] OR     TONSILLECTOMY      12/2005       FAMILY HISTORY:   Family History   Problem Relation Age of Onset     Heart Disease Mother         Heart Disease,Aortic stenosis and valve replaced     Family History Negative Father         Good Health     Asthma Maternal Grandmother         Asthma     Heart Disease Maternal Grandfather         Heart Disease     Cerebrovascular Disease Paternal Grandfather        SOCIAL HISTORY:   Social History     Tobacco Use     Smoking status: Former     Types: Vaping Device     Smokeless tobacco: Never     Tobacco comments:     using e-cig.. approx 25 mg daily   Vaping Use     Vaping status: Every Day     Substances: Nicotine, Flavoring     Devices: Refillable tank     Passive vaping exposure: Yes   Substance Use Topics     Alcohol use: Yes     Alcohol/week: 21.0 standard drinks of alcohol     Comment: couple of brandys a night        Allergies   Allergen Reactions     Codeine Shortness Of Breath and Hives     itching     Peanut-Containing Drug Products Shortness Of Breath     Hives     Peanuts [Nuts] Shortness Of Breath     Hives     Morphine      Current Outpatient Medications   Medication     albuterol (PROAIR HFA/PROVENTIL HFA/VENTOLIN HFA) 108 (90 Base) MCG/ACT inhaler     cyclobenzaprine (FLEXERIL) 5 MG tablet     fluticasone (FLOVENT HFA) 110 MCG/ACT inhaler     ibuprofen (ADVIL/MOTRIN) 600 MG tablet     sulfaSALAzine (AZULFIDINE) 500 MG tablet     No current facility-administered medications for this visit.         Review of Systems   Per HPI      "   Objective    /76   Pulse 84   Temp 97.6  F (36.4  C)   Resp 14   Ht 1.651 m (5' 5\")   Wt 80.3 kg (177 lb)   LMP 07/01/2018 (LMP Unknown)   SpO2 99%   BMI 29.45 kg/m    Body mass index is 29.45 kg/m .  Physical Exam   General: Pleasant, in no apparent distress.  Musculoskeletal: Tenderness to palpation over ventral left wrist extending into base of hand. Limited ROM of wrist and hand on left compared to right. Full ROM of fingers. Tenderness to palpation below medial and lateral malleoli on right ankle. Pain with ROM but no limitations to right dorsi or plantar flexion. Non antalgic gait in clinic.   Neurologic Exam: Normal gross motor, tone coordination and no visible tremor.  Skin: No jaundice, pallor, rashes, or lesions on exposed skin.   Psych: Appropriate mood and affect.    Results for orders placed or performed during the hospital encounter of 05/16/23   XR Wrist Left G/E 3 Views     Status: None    Narrative    PROCEDURE:  XR WRIST LEFT G/E 3 VIEWS    HISTORY: Left wrist pain    COMPARISON:  11/10/2022, 10/21/2022    TECHNIQUE:  XR WRIST LEFT 4 VIEWS    FINDINGS:   No new acute fracture or dislocation is identified. Demineralization  along the fracture plane between small trapezium fracture fragment and  donor trapezium cortex. No bony bridging. Stable alignment of fracture  fragment. The joint spaces are preserved.     No foreign body is seen.     Soft tissues are within normal limits.       Impression    IMPRESSION:   No substantial bony bridging of avulsive type trapezium fracture.    MIRACLE MONTERO MD         SYSTEM ID:  [id number]   Results for orders placed or performed during the hospital encounter of 05/16/23   XR Ankle Right G/E 3 Views     Status: None    Narrative    PROCEDURE:  XR ANKLE RIGHT G/E 3 VIEWS    HISTORY: Right ankle pain, unspecified chronicity.    COMPARISON:  6/22/2020    TECHNIQUE:  3 views right ankle.    FINDINGS:  Osteophytosis of the mortise joint is redemonstrated. " There  is a small ossific joint body projecting over the anterior mortise  joint, less well seen on prior studies from 2020. The previously  questioned talar osteochondral defect was not seen on prior MRI in the  appearance is similar, likely related to tibial osteophytes. No acute  fracture is identified.       Impression    IMPRESSION: Progressive osteoarthritis of the right ankle.      TALIA BISWAS MD         SYSTEM ID:  [id number]   Results for orders placed or performed in visit on 05/16/23   Wet Prep, Genital     Status: Normal    Specimen: Vagina; Swab   Result Value Ref Range    Trichomonas Absent Absent    Yeast Absent Absent    Clue Cells Absent Absent    WBCs/high power field None None

## 2023-05-16 ENCOUNTER — HOSPITAL ENCOUNTER (OUTPATIENT)
Dept: GENERAL RADIOLOGY | Facility: OTHER | Age: 33
Discharge: HOME | End: 2023-05-16
Attending: PHYSICIAN ASSISTANT
Payer: COMMERCIAL

## 2023-05-16 ENCOUNTER — OFFICE VISIT (OUTPATIENT)
Dept: FAMILY MEDICINE | Facility: OTHER | Age: 33
End: 2023-05-16
Attending: PHYSICIAN ASSISTANT
Payer: COMMERCIAL

## 2023-05-16 VITALS
HEIGHT: 65 IN | BODY MASS INDEX: 29.49 KG/M2 | OXYGEN SATURATION: 99 % | DIASTOLIC BLOOD PRESSURE: 76 MMHG | HEART RATE: 84 BPM | RESPIRATION RATE: 14 BRPM | TEMPERATURE: 97.6 F | SYSTOLIC BLOOD PRESSURE: 124 MMHG | WEIGHT: 177 LBS

## 2023-05-16 DIAGNOSIS — J45.20 MILD INTERMITTENT ASTHMA WITHOUT COMPLICATION: ICD-10-CM

## 2023-05-16 DIAGNOSIS — M25.532 LEFT WRIST PAIN: ICD-10-CM

## 2023-05-16 DIAGNOSIS — M25.571 RIGHT ANKLE PAIN, UNSPECIFIED CHRONICITY: ICD-10-CM

## 2023-05-16 DIAGNOSIS — N89.8 VAGINAL ITCHING: Primary | ICD-10-CM
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
## 2023-05-16 PROBLEM — M45.9 ANKYLOSING SPONDYLITIS, UNSPECIFIED SITE OF SPINE (H): Status: ACTIVE | Noted: 2023-04-26

## 2023-05-16 LAB
CLUE CELLS: NORMAL
TRICHOMONAS, WET PREP: NORMAL
WBC'S/HIGH POWER FIELD, WET PREP: NORMAL
YEAST, WET PREP: NORMAL

## 2023-05-16 PROCEDURE — 73110 X-RAY EXAM OF WRIST: CPT | Mod: LT

## 2023-05-16 PROCEDURE — G0463 HOSPITAL OUTPT CLINIC VISIT: HCPCS | Mod: 25 | Performed by: PHYSICIAN ASSISTANT

## 2023-05-16 PROCEDURE — 99214 OFFICE O/P EST MOD 30 MIN: CPT | Performed by: PHYSICIAN ASSISTANT

## 2023-05-16 PROCEDURE — 87210 SMEAR WET MOUNT SALINE/INK: CPT | Mod: ZL | Performed by: PHYSICIAN ASSISTANT

## 2023-05-16 PROCEDURE — 73610 X-RAY EXAM OF ANKLE: CPT | Mod: RT

## 2023-05-16 RX ORDER — ALBUTEROL SULFATE 90 UG/1
2 AEROSOL, METERED RESPIRATORY (INHALATION) EVERY 4 HOURS PRN
Qty: 18 G | Refills: 3 | Status: SHIPPED | OUTPATIENT
Start: 2023-05-16 | End: 2024-07-31

## 2023-05-16 ASSESSMENT — ASTHMA QUESTIONNAIRES
QUESTION_1 LAST FOUR WEEKS HOW MUCH OF THE TIME DID YOUR ASTHMA KEEP YOU FROM GETTING AS MUCH DONE AT WORK, SCHOOL OR AT HOME: A LITTLE OF THE TIME
QUESTION_2 LAST FOUR WEEKS HOW OFTEN HAVE YOU HAD SHORTNESS OF BREATH: THREE TO SIX TIMES A WEEK
QUESTION_5 LAST FOUR WEEKS HOW WOULD YOU RATE YOUR ASTHMA CONTROL: SOMEWHAT CONTROLLED
ACT_TOTALSCORE: 16
QUESTION_3 LAST FOUR WEEKS HOW OFTEN DID YOUR ASTHMA SYMPTOMS (WHEEZING, COUGHING, SHORTNESS OF BREATH, CHEST TIGHTNESS OR PAIN) WAKE YOU UP AT NIGHT OR EARLIER THAN USUAL IN THE MORNING: ONCE A WEEK
QUESTION_4 LAST FOUR WEEKS HOW OFTEN HAVE YOU USED YOUR RESCUE INHALER OR NEBULIZER MEDICATION (SUCH AS ALBUTEROL): TWO OR THREE TIMES PER WEEK
ACT_TOTALSCORE: 16

## 2023-05-16 ASSESSMENT — PATIENT HEALTH QUESTIONNAIRE - PHQ9
SUM OF ALL RESPONSES TO PHQ QUESTIONS 1-9: 15
10. IF YOU CHECKED OFF ANY PROBLEMS, HOW DIFFICULT HAVE THESE PROBLEMS MADE IT FOR YOU TO DO YOUR WORK, TAKE CARE OF THINGS AT HOME, OR GET ALONG WITH OTHER PEOPLE: SOMEWHAT DIFFICULT
SUM OF ALL RESPONSES TO PHQ QUESTIONS 1-9: 15

## 2023-05-16 ASSESSMENT — PAIN SCALES - GENERAL: PAINLEVEL: MODERATE PAIN (4)

## 2023-05-16 NOTE — NURSING NOTE
Patient presents to clinic for right ankle pain, a few weeks ago she fell and she is having pain. She is having vaginal itching.  Tiera Orourke LPN ....................  5/16/2023   8:50 AM

## 2023-06-29 ENCOUNTER — MYC MEDICAL ADVICE (OUTPATIENT)
Dept: FAMILY MEDICINE | Facility: OTHER | Age: 33
End: 2023-06-29
Payer: COMMERCIAL

## 2023-06-29 DIAGNOSIS — B37.0 THRUSH: ICD-10-CM

## 2023-06-29 RX ORDER — NYSTATIN 100000/ML
500000 SUSPENSION, ORAL (FINAL DOSE FORM) ORAL 4 TIMES DAILY
Qty: 140 ML | Refills: 1 | Status: SHIPPED | OUTPATIENT
Start: 2023-06-29 | End: 2024-06-02

## 2023-10-02 ENCOUNTER — MYC MEDICAL ADVICE (OUTPATIENT)
Dept: FAMILY MEDICINE | Facility: OTHER | Age: 33
End: 2023-10-02
Payer: COMMERCIAL

## 2023-10-05 ENCOUNTER — TRANSFERRED RECORDS (OUTPATIENT)
Dept: HEALTH INFORMATION MANAGEMENT | Facility: OTHER | Age: 33
End: 2023-10-05
Payer: COMMERCIAL

## 2023-10-19 NOTE — NURSING NOTE
Patient Information     Patient Name MRN Sex Yohana Harvey [id number] Female 1990      Nursing Note by Noemi Seth at 2017  3:30 PM     Author:  Noemi Seth Service:  (none) Author Type:  (none)     Filed:  2017  3:33 PM Encounter Date:  2017 Status:  Signed     :  Noemi Seth            Patient here for follow up from ED on 17 for back strain that is not better the pain is increasing. The left leg has shooting pain and the toes go numb.  The posion sumac is clearing nicely. Noemi Seth LPN .......................2017  3:30 PM           Pending Prescriptions:                       Disp   Refills    donepezil (ARICEPT) 5 MG tablet           90 tab*3            Sig: Take 1 tablet (5 mg) by mouth at bedtime

## 2024-02-21 ENCOUNTER — MYC REFILL (OUTPATIENT)
Dept: FAMILY MEDICINE | Facility: OTHER | Age: 34
End: 2024-02-21
Payer: COMMERCIAL

## 2024-02-21 RX ORDER — SULFASALAZINE 500 MG/1
TABLET ORAL
Status: CANCELLED | OUTPATIENT
Start: 2024-02-21

## 2024-02-21 RX ORDER — CYCLOBENZAPRINE HCL 5 MG
5 TABLET ORAL 3 TIMES DAILY PRN
Status: CANCELLED | OUTPATIENT
Start: 2024-02-21

## 2024-02-22 NOTE — TELEPHONE ENCOUNTER
[facility] sent Rx request for the following:      Requested Prescriptions   Pending Prescriptions Disp Refills    sulfaSALAzine (AZULFIDINE) 500 MG tablet         There is no refill protocol information for this order       cyclobenzaprine (FLEXERIL) 5 MG tablet       Sig: Take 1 tablet (5 mg) by mouth 3 times daily as needed       There is no refill protocol information for this order          Last Prescription Date:   unsure both noted as historical  Last Fill Qty/Refills:         , R-    Last Office Visit:              5/16/23   Future Office visit:           5/10/24 with Dr. Benitez    Routing refill request to provider for review/approval because:  Medication is reported/historical  Unsure of diagnosis to associate    Called patient and patient states that [facility] and message should have went to Dr. Benitez at [facility].  Patient will resend over there.  Cheryl Ch RN on 2/22/2024 at 12:11 PM

## 2024-02-24 ENCOUNTER — HEALTH MAINTENANCE LETTER (OUTPATIENT)
Age: 34
End: 2024-02-24

## 2024-03-18 ENCOUNTER — HOSPITAL ENCOUNTER (OUTPATIENT)
Dept: GENERAL RADIOLOGY | Facility: OTHER | Age: 34
Discharge: HOME | End: 2024-03-18
Payer: COMMERCIAL

## 2024-03-18 ENCOUNTER — OFFICE VISIT (OUTPATIENT)
Dept: FAMILY MEDICINE | Facility: OTHER | Age: 34
End: 2024-03-18
Payer: COMMERCIAL

## 2024-03-18 VITALS
SYSTOLIC BLOOD PRESSURE: 110 MMHG | HEART RATE: 77 BPM | TEMPERATURE: 96.7 F | WEIGHT: 152 LBS | OXYGEN SATURATION: 99 % | RESPIRATION RATE: 16 BRPM | DIASTOLIC BLOOD PRESSURE: 64 MMHG | BODY MASS INDEX: 25.29 KG/M2

## 2024-03-18 DIAGNOSIS — M25.571 PAIN IN JOINT, ANKLE AND FOOT, RIGHT: Primary | ICD-10-CM

## 2024-03-18 DIAGNOSIS — S99.911A ANKLE INJURY, RIGHT, INITIAL ENCOUNTER: ICD-10-CM

## 2024-03-18 PROCEDURE — G0463 HOSPITAL OUTPT CLINIC VISIT: HCPCS

## 2024-03-18 PROCEDURE — 99213 OFFICE O/P EST LOW 20 MIN: CPT

## 2024-03-18 PROCEDURE — 73630 X-RAY EXAM OF FOOT: CPT | Mod: RT

## 2024-03-18 PROCEDURE — 73610 X-RAY EXAM OF ANKLE: CPT | Mod: RT

## 2024-03-18 RX ORDER — ADALIMUMAB 40MG/0.4ML
40 KIT SUBCUTANEOUS
COMMUNITY
Start: 2023-09-13

## 2024-03-18 ASSESSMENT — PATIENT HEALTH QUESTIONNAIRE - PHQ9: SUM OF ALL RESPONSES TO PHQ QUESTIONS 1-9: 9

## 2024-03-18 NOTE — NURSING NOTE
Patient presents to clinic today for concern about right ankle, with recent injury again end of February.  Medication review completed.    Advanced Care Planning discussed/reviewed.    FOOD SECURITY SCREENING QUESTIONS    The next two questions are to help us understand your food security.  If you are feeling you need any assistance in this area, we have resources available to support you today.    Hunger Vital Signs:  Within the past 12 months we worried whether our food would run out before we got money to buy more. Never  Within the past 12 months the food we bought just didn't last and we didn't have money to get more. Never    Radha Mohr CMA([facility])..................3/18/2024   10:59 AM

## 2024-03-18 NOTE — PROGRESS NOTES
ASSESSMENT/PLAN:    I have reviewed the nursing notes.  I have reviewed the findings, diagnosis, plan and need for follow up with the patient.    1. Ankle injury, right, initial encounter  2. Pain in joint, ankle and foot, right    - XR Foot Right G/E 3 Views-no acute osseous abnormalities noted per radiologist    - XR Ankle Right G/E 3 Views-no acute osseous abnormalities noted per radiologist    - Recommend continue to follow the RICE protocol as you have been previously as sprains can take several weeks to resolve.    - May use over-the-counter Tylenol or ibuprofen PRN    - Discussed warning signs/symptoms indicative of need to f/u    - Follow up if symptoms persist or worsen or concerns    - I explained my diagnostic considerations and recommendations to the patient, who voiced understanding and agreement with the treatment plan. All questions were answered. We discussed potential side effects of any prescribed or recommended therapies, as well as expectations for response to treatments.    Annalise Erwin, DALIA CNP  3/18/2024  11:14 AM    HPI:    Yohana Robertson is a 33 year old female who presents to [facility] today for concerns of right ankle pain.  Patient states that she slipped and fell on a patch of ice approximately 3 weeks ago.  States she heard a popping sound, instantly swollen and painful, bruised 2-3 days later.  Has been doing RICE but states still concerned that it may be broken.  Tingling and numbness on and off.  History of surgery on right ankle.       Past Medical History:   Diagnosis Date    Bunion of great toe of left foot     No Comments Provided    Endometriosis 07/2018    diagnosed laparoscopically     Past Surgical History:   Procedure Laterality Date    ARTHROSCOPY ANKLE, OPEN REPAIR LIGAMENT, COMBINED Right 08/06/2020    Procedure: Posterior ARTHROSCOPY, ANKLE, WITH OPEN REPAIR OF ANKLE LIGAMENT, resection of os trigonum, FHL tenosyonovectomy;  Surgeon: Sean Salcedo DPM;   Location: [facility] OR    BUNIONECTOMY      4/2005,Left    COLONOSCOPY      7/2013,Normal    CYSTOSCOPY N/A 07/26/2018    Procedure: CYSTOSCOPY;;  Surgeon: Renate Ch MD;  Location:  OR    DENTAL SURGERY      wisdom teeth    FRACTURE SURGERY      6/13,L Foot-tarsometatarsal realignment arthrodesis with plate and screw fixation, capsule tendon balancing procedures about the first tarsometatarsal joint, Landry Gibson DPM  [facility]    hardware removal Left     7/26/2006,[id number],REMOVAL OF FOREIGN BODY,Removal of harware L foot after bunionectomy [Other][[[[    HYSTERECTOMY, PAP NO LONGER INDICATED      LAPAROSCOPIC HYSTERECTOMY TOTAL, SALPINGECTOMY N/A 07/26/2018    Procedure: LAPAROSCOPIC HYSTERECTOMY TOTAL, SALPINGECTOMY;  Total Laparoscopic Hysterectomy & Bilateral Salpingectomy, Cystoscopy.;  Surgeon: Renate Ch MD;  Location:  OR    TONSILLECTOMY      12/2005     Social History     Tobacco Use    Smoking status: Former     Types: Vaping Device    Smokeless tobacco: Never    Tobacco comments:     using e-cig.. approx 25 mg daily   Substance Use Topics    Alcohol use: Yes     Alcohol/week: 21.0 standard drinks of alcohol     Comment: couple of brandys a night     Current Outpatient Medications   Medication Sig Dispense Refill    albuterol (PROAIR HFA/PROVENTIL HFA/VENTOLIN HFA) 108 (90 Base) MCG/ACT inhaler Inhale 2 puffs into the lungs every 4 hours as needed for shortness of breath or wheezing 18 g 3    fluticasone (FLOVENT HFA) 110 MCG/ACT inhaler Inhale 1 puff into the lungs 2 times daily 12 g 1    HUMIRA *CF* PEN 40 MG/0.4ML pen kit Inject 40 mg Subcutaneous every 14 days      sulfaSALAzine (AZULFIDINE) 500 MG tablet TAKE 1 TABLET BY MOUTH TWO TIMES A DAY FOR 7 DAYS, THEN 2 TABLETS TWO TIMES A DAY  DAYS.      cyclobenzaprine (FLEXERIL) 5 MG tablet Take 5 mg by mouth 3 times daily as needed      ibuprofen (ADVIL/MOTRIN) 600 MG tablet Take 1 tablet (600 mg) by mouth every 8  hours as needed for moderate pain (4-6) 90 tablet 1    nystatin (MYCOSTATIN) 258640 UNIT/ML suspension Take 5 mLs (500,000 Units) by mouth 4 times daily (Patient not taking: Reported on 3/18/2024) 140 mL 1     Allergies   Allergen Reactions    Codeine Shortness Of Breath and Hives     itching    Peanut-Containing Drug Products Shortness Of Breath     Hives    Peanuts [Nuts] Shortness Of Breath     Hives    Morphine      Past medical history, past surgical history, current medications and allergies reviewed and accurate to the best of my knowledge.      ROS:  Refer to HPI    /64 (BP Location: Right arm, Patient Position: Sitting, Cuff Size: Adult Regular)   Pulse 77   Temp (!) 96.7  F (35.9  C) (Tympanic)   Resp 16   Wt 68.9 kg (152 lb)   LMP 07/01/2018 (LMP Unknown)   SpO2 99%   BMI 25.29 kg/m      EXAM:  General Appearance: Well appearing 33 year old female, appropriate appearance for age. No acute distress   Respiratory: normal chest wall and respirations.  Normal effort.  Clear to auscultation bilaterally, no wheezing, crackles or rhonchi.  No increased work of breathing.  No cough appreciated.  Cardiac: RRR with no murmurs  Musculoskeletal:  Equal movement of bilateral upper extremities.  Equal movement of bilateral lower extremities.  Limping.  No edema noted.  Light ecchymosis noted on outer right ankle.  Pain upon palpation lateral ankle.  No obvious deformities noted.  Neuro: Alert and oriented to person, place, and time.     Psychological: normal affect, alert, oriented, and pleasant.     Xray:  Results for orders placed or performed in visit on 03/18/24   XR Foot Right G/E 3 Views     Status: None    Narrative    PROCEDURE:  XR FOOT RIGHT G/E 3 VIEWS    HISTORY: Ankle injury, right, initial encounter; Pain in joint, ankle  and foot, right    COMPARISON: Same-day radiographs of the right ankle    TECHNIQUE:  XR FOOT RIGHT 3 VIEWS    FINDINGS:   No acute fracture or dislocation is identified. No  suspicious osseous  lesion. The joint spaces are preserved. Mild degenerative changes  about the anterior tibiotalar joint including unchanged  well-corticated ossicle.    No foreign body or subcutaneous emphysema.        Impression    IMPRESSION:   No acute osseous abnormality.    MIRACLE MONTERO MD         SYSTEM ID:  [id number]   XR Ankle Right G/E 3 Views     Status: None    Narrative    PROCEDURE:  XR ANKLE RIGHT G/E 3 VIEWS    HISTORY: Ankle injury, right, initial encounter; Pain in joint, ankle  and foot, right    COMPARISON: Same day radiographs of the foot; right ankle radiographs  5/16/2023    TECHNIQUE:  XR ANKLE RIGHT 3 VIEWS    FINDINGS:   No acute fracture or dislocation is identified. No suspicious osseous  lesion. The joint spaces are preserved. Mild degenerative changes  along the anterior tibiotalar joint.    No foreign body or subcutaneous emphysema.        Impression    IMPRESSION:   No acute osseous abnormality.    MIRACLE MONTERO MD         SYSTEM ID:  [id number]

## 2024-04-11 NOTE — TELEPHONE ENCOUNTER
"Called and spoke to Patient after verifying last name and date of birth. It was discussed that Patient has 30 minute appointment scheduled and was reassured that her pap can be done during her visit. Also writer inquired regarding infection. Patient states she is \"just wanting to rule this out\" as the source of her pain. She states that \"they never did a urine sample or anything when I saw Dr. Robertson.\" Patient encouraged to mention this to the nurse that rooms her for her appointment so that it can be brought up to the provider. Patient verbalized agreement with this plan.    Kelly Eden RN .............. 5/15/2018  8:46 AM      " Received via fax the bilateral diagnostic mammogram report done on 12/5/23  [facility] [address] [phone]/5/23.  Health maintenance updated & report placed in Dr. Sheth in box for review.

## 2024-06-02 ENCOUNTER — MYC REFILL (OUTPATIENT)
Dept: FAMILY MEDICINE | Facility: OTHER | Age: 34
End: 2024-06-02
Payer: COMMERCIAL

## 2024-06-02 DIAGNOSIS — B37.0 THRUSH: ICD-10-CM

## 2024-06-03 RX ORDER — NYSTATIN 100000/ML
500000 SUSPENSION, ORAL (FINAL DOSE FORM) ORAL 4 TIMES DAILY
Qty: 140 ML | Refills: 1 | Status: SHIPPED | OUTPATIENT
Start: 2024-06-03 | End: 2024-06-24

## 2024-06-20 NOTE — PROGRESS NOTES
Assessment & Plan     {[facility] Picklist:506721}          No follow-ups on file.    Trudy Muller is a 33 year old, presenting for the following health issues:  No chief complaint on file.    HPI       PAST MEDICAL HISTORY:   Past Medical History:   Diagnosis Date    Bunion of great toe of left foot     No Comments Provided    Endometriosis 07/2018    diagnosed laparoscopically       PAST SURGICAL HISTORY:   Past Surgical History:   Procedure Laterality Date    ARTHROSCOPY ANKLE, OPEN REPAIR LIGAMENT, COMBINED Right 08/06/2020    Procedure: Posterior ARTHROSCOPY, ANKLE, WITH OPEN REPAIR OF ANKLE LIGAMENT, resection of os trigonum, FHL tenosyonovectomy;  Surgeon: Sean Salcedo DPM;  Location:  OR    BUNIONECTOMY      4/2005,Left    COLONOSCOPY      7/2013,Normal    CYSTOSCOPY N/A 07/26/2018    Procedure: CYSTOSCOPY;;  Surgeon: Renate Ch MD;  Location:  OR    DENTAL SURGERY      wisdom teeth    FRACTURE SURGERY      6/13,L Foot-tarsometatarsal realignment arthrodesis with plate and screw fixation, capsule tendon balancing procedures about the first tarsometatarsal joint, Landry Gibson DPM  [facility]    hardware removal Left     7/26/2006,[id number],REMOVAL OF FOREIGN BODY,Removal of harware L foot after bunionectomy [Other][[[[    HYSTERECTOMY, PAP NO LONGER INDICATED      LAPAROSCOPIC HYSTERECTOMY TOTAL, SALPINGECTOMY N/A 07/26/2018    Procedure: LAPAROSCOPIC HYSTERECTOMY TOTAL, SALPINGECTOMY;  Total Laparoscopic Hysterectomy & Bilateral Salpingectomy, Cystoscopy.;  Surgeon: Renate Ch MD;  Location:  OR    TONSILLECTOMY      12/2005       FAMILY HISTORY:   Family History   Problem Relation Age of Onset    Heart Disease Mother         Heart Disease,Aortic stenosis and valve replaced    Family History Negative Father         Good Health    Asthma Maternal Grandmother         Asthma    Heart Disease Maternal Grandfather         Heart Disease    Cerebrovascular Disease Paternal  Grandfather        SOCIAL HISTORY:   Social History     Tobacco Use    Smoking status: Former     Types: Vaping Device    Smokeless tobacco: Never    Tobacco comments:     using e-cig.. approx 25 mg daily   Substance Use Topics    Alcohol use: Yes     Alcohol/week: 21.0 standard drinks of alcohol     Comment: couple of brandys a night        Allergies   Allergen Reactions    Codeine Shortness Of Breath and Hives     itching    Peanut-Containing Drug Products Shortness Of Breath     Hives    Peanuts [Nuts] Shortness Of Breath     Hives    Morphine      Current Outpatient Medications   Medication Sig Dispense Refill    albuterol (PROAIR HFA/PROVENTIL HFA/VENTOLIN HFA) 108 (90 Base) MCG/ACT inhaler Inhale 2 puffs into the lungs every 4 hours as needed for shortness of breath or wheezing 18 g 3    cyclobenzaprine (FLEXERIL) 5 MG tablet Take 5 mg by mouth 3 times daily as needed      fluticasone (FLOVENT HFA) 110 MCG/ACT inhaler Inhale 1 puff into the lungs 2 times daily 12 g 1    HUMIRA *CF* PEN 40 MG/0.4ML pen kit Inject 40 mg Subcutaneous every 14 days      ibuprofen (ADVIL/MOTRIN) 600 MG tablet Take 1 tablet (600 mg) by mouth every 8 hours as needed for moderate pain (4-6) 90 tablet 1    nystatin (MYCOSTATIN) 546098 UNIT/ML suspension Take 5 mLs (500,000 Units) by mouth 4 times daily 140 mL 1    sulfaSALAzine (AZULFIDINE) 500 MG tablet TAKE 1 TABLET BY MOUTH TWO TIMES A DAY FOR 7 DAYS, THEN 2 TABLETS TWO TIMES A DAY  DAYS.       No current facility-administered medications for this visit.           Objective    LMP 07/01/2018 (LMP Unknown)   There is no height or weight on file to calculate BMI.  General: Pleasant, in no apparent distress.  Eyes: Sclera are white and conjunctiva are clear bilaterally. Lacrimal apparatus free of erythema, edema, and discharge bilaterally.  Ears: External ears without erythema or edema. Tympanic membranes are pearly white and without erythema, scarring or perforations  bilaterally. External auditory canals are free of foreign bodies, erythema, ulcers, and masses.  Nose: External nose is symmetrical and free of lesions and deformities. Mucosa is soft pink and without erythema, edema, bleeding, or exudate. No septal perforation or deviation.  Oropharynx: Oral mucosa is pink and without ulcers, nodules, and white patches. Tongue is symmetrical, pink, and without masses or lesions. Pharynx is pink, symmetrical, and without lesions. Uvula is midline. Tonsils are pink, symmetrical, and without edema, ulcers, or exudates, and 1+ bilaterally.  Neck: No cervical lymphadenopathy on inspection and palpation.  Thyroid: Thyroid isthmus is palpable and midline. Lobes are palpable bilaterally but not enlarged.  Cardiovascular: Regular rate and rhythm with S1 equal to S2. No murmurs, friction rubs, or gallops.   Respiratory: Lungs are resonant and clear to auscultation bilaterally. No wheezes, crackles, or rhonchi.  Abdomen: Abdomen is non-distended and without bulging flanks, prominent venous markings, or ecchymosis. Active bowel sounds heard in all four quadrants. No tenderness to palpation in all four quadrants. No rebound tenderness or guarding. No palpable masses noted. No hepatosplenomegaly.  Musculoskeletal: Back is straight, no tenderness to palpation of paraspinal and paravertebral muscles. Full ROM of back, neck, upper and lower extremities.  Neurologic Exam: Non-focal, symmetric DTRs, normal gross motor, tone coordination and no visible tremor.  Skin: No jaundice, pallor, rashes, or lesions.  Psych: Appropriate mood and affect.      Signed Electronically by: Andie Topete PA-C

## 2024-06-24 ENCOUNTER — OFFICE VISIT (OUTPATIENT)
Dept: FAMILY MEDICINE | Facility: OTHER | Age: 34
End: 2024-06-24
Attending: PHYSICIAN ASSISTANT
Payer: COMMERCIAL

## 2024-06-24 VITALS
RESPIRATION RATE: 14 BRPM | OXYGEN SATURATION: 100 % | TEMPERATURE: 97.1 F | BODY MASS INDEX: 25.29 KG/M2 | HEIGHT: 65 IN | HEART RATE: 96 BPM | DIASTOLIC BLOOD PRESSURE: 74 MMHG | SYSTOLIC BLOOD PRESSURE: 116 MMHG | WEIGHT: 151.8 LBS

## 2024-06-24 DIAGNOSIS — F41.9 ANXIETY: ICD-10-CM

## 2024-06-24 DIAGNOSIS — F33.2 SEVERE EPISODE OF RECURRENT MAJOR DEPRESSIVE DISORDER, WITHOUT PSYCHOTIC FEATURES (H): ICD-10-CM

## 2024-06-24 DIAGNOSIS — Z00.00 ROUTINE HISTORY AND PHYSICAL EXAMINATION OF ADULT: Primary | ICD-10-CM

## 2024-06-24 DIAGNOSIS — B37.0 THRUSH: ICD-10-CM

## 2024-06-24 DIAGNOSIS — J45.20 MILD INTERMITTENT ASTHMA WITHOUT COMPLICATION: ICD-10-CM

## 2024-06-24 DIAGNOSIS — F98.8 ATTENTION DEFICIT DISORDER, UNSPECIFIED HYPERACTIVITY PRESENCE: ICD-10-CM

## 2024-06-24 PROCEDURE — G0463 HOSPITAL OUTPT CLINIC VISIT: HCPCS | Performed by: PHYSICIAN ASSISTANT

## 2024-06-24 PROCEDURE — 99214 OFFICE O/P EST MOD 30 MIN: CPT | Mod: 25 | Performed by: PHYSICIAN ASSISTANT

## 2024-06-24 PROCEDURE — 99395 PREV VISIT EST AGE 18-39: CPT | Performed by: PHYSICIAN ASSISTANT

## 2024-06-24 RX ORDER — NYSTATIN 100000/ML
500000 SUSPENSION, ORAL (FINAL DOSE FORM) ORAL 4 TIMES DAILY
Qty: 140 ML | Refills: 1 | Status: SHIPPED | OUTPATIENT
Start: 2024-06-24 | End: 2024-07-31

## 2024-06-24 SDOH — HEALTH STABILITY: PHYSICAL HEALTH
ON AVERAGE, HOW MANY DAYS PER WEEK DO YOU ENGAGE IN MODERATE TO STRENUOUS EXERCISE (LIKE A BRISK WALK)?: PATIENT DECLINED

## 2024-06-24 SDOH — HEALTH STABILITY: PHYSICAL HEALTH: ON AVERAGE, HOW MANY MINUTES DO YOU ENGAGE IN EXERCISE AT THIS LEVEL?: PATIENT DECLINED

## 2024-06-24 ASSESSMENT — PATIENT HEALTH QUESTIONNAIRE - PHQ9
10. IF YOU CHECKED OFF ANY PROBLEMS, HOW DIFFICULT HAVE THESE PROBLEMS MADE IT FOR YOU TO DO YOUR WORK, TAKE CARE OF THINGS AT HOME, OR GET ALONG WITH OTHER PEOPLE: SOMEWHAT DIFFICULT
SUM OF ALL RESPONSES TO PHQ QUESTIONS 1-9: 15
SUM OF ALL RESPONSES TO PHQ QUESTIONS 1-9: 15

## 2024-06-24 ASSESSMENT — ASTHMA QUESTIONNAIRES
QUESTION_3 LAST FOUR WEEKS HOW OFTEN DID YOUR ASTHMA SYMPTOMS (WHEEZING, COUGHING, SHORTNESS OF BREATH, CHEST TIGHTNESS OR PAIN) WAKE YOU UP AT NIGHT OR EARLIER THAN USUAL IN THE MORNING: ONCE OR TWICE
QUESTION_2 LAST FOUR WEEKS HOW OFTEN HAVE YOU HAD SHORTNESS OF BREATH: ONCE OR TWICE A WEEK
ACT_TOTALSCORE: 19
QUESTION_4 LAST FOUR WEEKS HOW OFTEN HAVE YOU USED YOUR RESCUE INHALER OR NEBULIZER MEDICATION (SUCH AS ALBUTEROL): ONCE A WEEK OR LESS
QUESTION_5 LAST FOUR WEEKS HOW WOULD YOU RATE YOUR ASTHMA CONTROL: SOMEWHAT CONTROLLED
ACT_TOTALSCORE: 19
QUESTION_1 LAST FOUR WEEKS HOW MUCH OF THE TIME DID YOUR ASTHMA KEEP YOU FROM GETTING AS MUCH DONE AT WORK, SCHOOL OR AT HOME: A LITTLE OF THE TIME

## 2024-06-24 ASSESSMENT — PAIN SCALES - GENERAL: PAINLEVEL: MILD PAIN (2)

## 2024-06-24 ASSESSMENT — SOCIAL DETERMINANTS OF HEALTH (SDOH): HOW OFTEN DO YOU GET TOGETHER WITH FRIENDS OR RELATIVES?: NEVER

## 2024-06-24 NOTE — NURSING NOTE
Patient presents to clinic for annual physical and with mouth lump and pain that goes into left ear  [place] RENAE Orourke LPN ....................  6/24/2024   1:25 PM  EXT [id number]

## 2024-06-24 NOTE — PROGRESS NOTES
Preventive Care Visit  [facility]  Andie Topete PA-C, Family Medicine  Jun 24, 2024      Assessment & Plan     1. Routine history and physical examination of adult  Up-to-date on preventative exams, lab work.    2. Thrush  Symptoms and exam consistent with thrush.  Patient was unable to get nystatin that was prescribed earlier this month, repeat prescription as below.  Follow-up as needed.  - nystatin (MYCOSTATIN) 264721 UNIT/ML suspension; Take 5 mLs (500,000 Units) by mouth 4 times daily  Dispense: 140 mL; Refill: 1    3. Mild intermittent asthma without complication  Chronic, stable.    4. Attention deficit disorder, unspecified hyperactivity presence  5. Severe episode of recurrent major depressive disorder, without psychotic features (H)  6. Anxiety  Chronic, stable.  Encouraged to establish with mental health to have updated diagnostic assessment completed.      Counseling  Appropriate preventive services were discussed with this patient, including applicable screening as appropriate for fall prevention, nutrition, physical activity, Tobacco-use cessation, weight loss and cognition.  Checklist reviewing preventive services available has been given to the patient.  Reviewed patient's diet, addressing concerns and/or questions.   Patient is at risk for social isolation and has been provided with information about the benefit of social connection.   She is at risk for psychosocial distress and has been provided with information to reduce risk.   The patient reports drinking more than 3 alcoholic drinks per day and/or more than 7 drhnks per week. The patient was counseled and given information about possible harmful effects of excessive alcohol intake.The patient's PHQ-9 score is consistent with moderate depression. She was provided with information regarding depression.           No follow-ups on file.    Trudy Muller is a 33 year old, presenting for the  following:  Physical         Health Care Directive  Patient does not have a Health Care Directive or Living Will: Discussed advance care planning with patient; however, patient declined at this time.    HPI  Here for annual physical.  Concerns for recurrent thrush.  Has noticed white tongue, dry sensation in her mouth, bad taste.  She has a history of thrush and has done well with nystatin.  This was refilled earlier this month but patient reports she was unable to get it from her pharmacy.  Since then she has noticed that her glands are swollen in her throat.  They wax and wane in size.  No associated fever/chills, upper respiratory symptoms.  No known sick contacts.    Continues with albuterol as needed for management of asthma.    Has questions regarding her disability and her mental health status.  Has not met with a mental health provider in quite some time, unsure if she has ever had a diagnostic assessment before.  Not currently on any mental health medications.        6/24/2024   General Health   How would you rate your overall physical health? (!) FAIR   Feel stress (tense, anxious, or unable to sleep) Only a little      (!) STRESS CONCERN      6/24/2024   Nutrition   Three or more servings of calcium each day? (!) NO   Diet: Low salt    Gluten-free/reduced    Other   If other, please elaborate: lactose free an trying stop spicey   How many servings of fruit and vegetables per day? (!) I DON'T KNOW   How many sweetened beverages each day? (!) 4+       Multiple values from one day are sorted in reverse-chronological order         6/24/2024   Exercise   Days per week of moderate/strenous exercise Patient declined   Average minutes spent exercising at this level Patient declined            6/24/2024   Social Factors   Frequency of gathering with friends or relatives Never   Worry food won't last until get money to buy more Yes   Food not last or not have enough money for food? No   Do you have housing?  (Housing is defined as stable permanent housing and does not include staying ouside in a car, in a tent, in an abandoned building, in an overnight shelter, or couch-surfing.) Yes   Are you worried about losing your housing? No   Lack of transportation? No   Unable to get utilities (heat,electricity)? No      (!) FOOD SECURITY CONCERN PRESENT(!) SOCIAL CONNECTIONS CONCERN      6/24/2024   Dental   Dentist two times every year? Yes            6/24/2024   TB Screening   Were you born outside of the [place]? No          Today's PHQ-9 Score:       6/24/2024     1:28 PM   PHQ-9 SCORE   PHQ-9 Total Score MyChart 15 (Moderately severe depression)   PHQ-9 Total Score 15         6/24/2024   Substance Use   Alcohol more than 3/day or more than 7/wk Yes   How often do you have a drink containing alcohol 4 or more times a week   How many alcohol drinks on typical day 3 or 4   How often do you have 5+ drinks at one occasion Monthly   Audit 2/3 Score 3   How often not able to stop drinking once started Less than monthly   How often failed to do what normally expected Never   How often needed first drink in am after a heavy drinking session Never   How often feeling of guilt or remorse after drinking Less than monthly   How often unable to remember what happened the night before Less than monthly   Have you or someone else been injured because of your drinking No   Has anyone been concerned or suggested you cut down on drinking No   TOTAL SCORE - AUDIT 10   Do you use any other substances recreationally? (!) CANNABIS PRODUCTS        Social History     Tobacco Use    Smoking status: Former     Types: Vaping Device    Smokeless tobacco: Never    Tobacco comments:     using e-cig.. approx 25 mg daily   Vaping Use    Vaping status: Every Day    Substances: Nicotine, Flavoring    Devices: Refillable tank    Passive vaping exposure: Yes   Substance Use Topics    Alcohol use: Yes     Alcohol/week: 21.0 standard drinks of alcohol     Comment:  couple of brandys a night    Drug use: No           3/15/2023   LAST FHS-7 RESULTS   1st degree relative breast or ovarian cancer No   Any relative bilateral breast cancer No   Any male have breast cancer No   Any ONE woman have BOTH breast AND ovarian cancer No   Any woman with breast cancer before 50yrs No   2 or more relatives with breast AND/OR ovarian cancer No   2 or more relatives with breast AND/OR bowel cancer No           Mammogram Screening - Patient under 40 years of age: Routine Mammogram Screening not recommended.         6/24/2024   STI Screening   New sexual partner(s) since last STI/HIV test? No        History of abnormal Pap smear: Status post hysterectomy with removal of cervix and no history of CIN2 or greater or cervical cancer. Health Maintenance and Surgical History updated.            Reviewed and updated as needed this visit by Provider                    Past Medical History:   Diagnosis Date    Bunion of great toe of left foot     No Comments Provided    Endometriosis 07/2018    diagnosed laparoscopically     Past Surgical History:   Procedure Laterality Date    ARTHROSCOPY ANKLE, OPEN REPAIR LIGAMENT, COMBINED Right 08/06/2020    Procedure: Posterior ARTHROSCOPY, ANKLE, WITH OPEN REPAIR OF ANKLE LIGAMENT, resection of os trigonum, FHL tenosyonovectomy;  Surgeon: Sean Salcedo DPM;  Location:  OR    BUNIONECTOMY      4/2005,Left    COLONOSCOPY      7/2013,Normal    CYSTOSCOPY N/A 07/26/2018    Procedure: CYSTOSCOPY;;  Surgeon: Renate Ch MD;  Location:  OR    DENTAL SURGERY      wisdom teeth    FRACTURE SURGERY      6/13,L Foot-tarsometatarsal realignment arthrodesis with plate and screw fixation, capsule tendon balancing procedures about the first tarsometatarsal joint, Landry Gibson DPM  [facility]    hardware removal Left     7/26/2006,[id number],REMOVAL OF FOREIGN BODY,Removal of harware L foot after bunionectomy [Other][[[[    HYSTERECTOMY, PAP NO LONGER  "INDICATED      LAPAROSCOPIC HYSTERECTOMY TOTAL, SALPINGECTOMY N/A 2018    Procedure: LAPAROSCOPIC HYSTERECTOMY TOTAL, SALPINGECTOMY;  Total Laparoscopic Hysterectomy & Bilateral Salpingectomy, Cystoscopy.;  Surgeon: Renate Ch MD;  Location: [facility] OR    TONSILLECTOMY      2005     OB History    Para Term  AB Living   4 2 2 0 2 2   SAB IAB Ectopic Multiple Live Births   2 0 0 0 2      # Outcome Date GA Lbr Gopal/2nd Weight Sex Type Anes PTL Lv   4 SAB            3 SAB            2 Term    2.722 kg (6 lb) F    AMY   1 Term    4.706 kg (10 lb 6 oz) M    AMY            Objective    Exam  /74   Pulse 96   Temp 97.1  F (36.2  C)   Resp 14   Ht 1.651 m (5' 5\")   Wt 68.9 kg (151 lb 12.8 oz)   LMP 2018 (LMP Unknown)   SpO2 100%   BMI 25.26 kg/m     Estimated body mass index is 25.26 kg/m  as calculated from the following:    Height as of this encounter: 1.651 m (5' 5\").    Weight as of this encounter: 68.9 kg (151 lb 12.8 oz).    Physical Exam  GENERAL: alert and no distress  EYES: Eyes grossly normal to inspection, PERRL and conjunctivae and sclerae normal  HENT: normal cephalic/atraumatic, ear canals and TM's normal, nose and mouth without ulcers or lesions, and tongue with faint white patches, dry mouth.  NECK: Visible and palpable cervical lymphadenopathy bilaterally.  RESP: lungs clear to auscultation - no rales, rhonchi or wheezes  CV: regular rate and rhythm, normal S1 S2, no S3 or S4, no murmur, click or rub, no peripheral edema  MS: no gross musculoskeletal defects noted, no edema  SKIN: no suspicious lesions or rashes  PSYCH: mentation appears normal, affect normal/bright        Signed Electronically by: Andie Topete PA-C    Answers submitted by the patient for this visit:  Patient Health Questionnaire (Submitted on 2024)  If you checked off any problems, how difficult have these problems made it for you to do your work, take care of things at home, " or get along with other people?: Somewhat difficult  PHQ9 TOTAL SCORE: 15

## 2024-07-31 ENCOUNTER — MYC REFILL (OUTPATIENT)
Dept: FAMILY MEDICINE | Facility: OTHER | Age: 34
End: 2024-07-31
Payer: COMMERCIAL

## 2024-07-31 DIAGNOSIS — B37.0 THRUSH: ICD-10-CM

## 2024-07-31 DIAGNOSIS — J45.40 MODERATE PERSISTENT ASTHMA WITHOUT COMPLICATION: ICD-10-CM

## 2024-07-31 DIAGNOSIS — J45.20 MILD INTERMITTENT ASTHMA WITHOUT COMPLICATION: ICD-10-CM

## 2024-08-01 RX ORDER — ALBUTEROL SULFATE 90 UG/1
2 AEROSOL, METERED RESPIRATORY (INHALATION) EVERY 4 HOURS PRN
Qty: 18 G | Refills: 3 | Status: SHIPPED | OUTPATIENT
Start: 2024-08-01

## 2024-08-01 RX ORDER — NYSTATIN 100000/ML
500000 SUSPENSION, ORAL (FINAL DOSE FORM) ORAL 4 TIMES DAILY
Qty: 140 ML | Refills: 1 | Status: SHIPPED | OUTPATIENT
Start: 2024-08-01

## 2024-08-01 RX ORDER — FLUTICASONE PROPIONATE 110 UG/1
1 AEROSOL, METERED RESPIRATORY (INHALATION) 2 TIMES DAILY
Qty: 12 G | Refills: 1 | Status: SHIPPED | OUTPATIENT
Start: 2024-08-01

## 2024-08-01 NOTE — TELEPHONE ENCOUNTER
[id number] ([employer]) of [place] sent Rx request for the following:      Requested Prescriptions   Pending Prescriptions Disp Refills    fluticasone (FLOVENT HFA) 110 MCG/ACT inhaler 12 g 1     Sig: Inhale 1 puff into the lungs 2 times daily       Inhaled Steroids Protocol Failed - 7/31/2024 10:57 AM        Failed - Asthma control assessment score within normal limits in last 6 months     Please review ACT score.        Last Prescription Date:   1/21/23  Last Fill Qty/Refills:         12 g, R-1             albuterol (PROAIR HFA/PROVENTIL HFA/VENTOLIN HFA) 108 (90 Base) MCG/ACT inhaler 18 g 3     Sig: Inhale 2 puffs into the lungs every 4 hours as needed for shortness of breath or wheezing       Short-Acting Beta Agonist Inhalers Protocol  Failed - 7/31/2024 10:57 AM        Failed - Asthma control assessment score within normal limits in last 6 months     Please review ACT score.          Last Prescription Date:   5/16/23  Last Fill Qty/Refills:         18 g, R-3           nystatin (MYCOSTATIN) 018232 UNIT/ML suspension 140 mL 1     Sig: Take 5 mLs (500,000 Units) by mouth 4 times daily       There is no refill protocol information for this order          Last Prescription Date:   6/24/24  Last Fill Qty/Refills:         140 ml, R-1    Last Office Visit:              6/24/24   Future Office visit:           none    Routing refill request to provider for review/approval because:  Drug not on the [facility] refill protocol     Lu He RN on 8/1/2024 at 2:10 PM

## 2024-09-04 ENCOUNTER — TELEPHONE (OUTPATIENT)
Dept: FAMILY MEDICINE | Facility: OTHER | Age: 34
End: 2024-09-04
Payer: COMMERCIAL

## 2024-09-04 NOTE — TELEPHONE ENCOUNTER
Andie Topete received forms for a medical opinion. She stated patient needs an appointment. Patient contacted and informed. Patient stated she would call back to schedule. Forms put in Andie Topete's pre-visit planning folder.     Charis Victor CMA on 9/4/2024 at 11:22 AM

## 2024-11-22 NOTE — PROGRESS NOTES
Assessment & Plan     1. Foot injury, left, initial encounter (Primary)  Recent left foot injury after dropping ervinblock on foot approximately 6 to 8 weeks ago.  X-ray updated which was unremarkable for acute bony injury.  Prior to injury she continued to struggle with persistent left foot discomfort in setting of prior bunionectomy as well as prior tarsometatarsal realignment arthrodesis with plate and screw fixation.  Had previously been working with orthopedics who recommended follow-up and consideration of possible tendon surgery but did not follow-up.  Referral placed to orthopedics for her to discuss.  Continue symptomatic management in the meantime.  - XR Foot Left G/E 3 Views; Future  - Orthopedic  Referral; Future    2. Palpitations  Differential includes underlying cardiac erythremia, PVC, PAC, medication side effect, related to stress or anxiety, etc.  Vitals and exam stable.  Labs pending as below.  Zio patch ordered.  - CBC and Differential; Future  - Basic Metabolic Panel; Future  - Magnesium; Future  - TSH Reflex [facility]; Future  - HC ZIO PATCH 8-14 DAYS APPLICATION; Future  - ZIO PATCH 8-14 DAYS (additional cost to patient); Future  - CBC and Differential  - Basic Metabolic Panel  - Magnesium  - TSH Reflex [facility]        No follow-ups on file.    Trudy Muller is a 34 year old, presenting for the following health issues:  Foot Injury (Left foot/leg injury )    History of Present Illness       Reason for visit:  Foot an leg pain  Symptom onset:  More than a month  Symptoms include:  Swelling pain  Symptom intensity:  Severe  Symptom progression:  Worsening  Had these symptoms before:  Yes  Has tried/received treatment for these symptoms:  Yes  Previous treatment was successful:  Yes  Prior treatment description:  Pt surgery  What makes it worse:  Walking  What makes it better:  Rest   She is taking medications regularly.     Here for evaluation of left foot pain.  Patient has extensive  history with prior injuries to left foot including prior tarsometatarsal realignment arthrodesis with plate and screw fixation, capsule tendon balancing procedures about the first tarsometatarsal joint in 2013.  Prior bunionectomy in 2005 on left.  Reports she had continued to struggle with left foot pain that she had been following with orthopedics who recommended consideration of further tendon surgeries however she did not follow-up and has not pursued additional orthopedic evaluation.  More recently at the beginning of last month she was moving and she accidentally dropped a cinder block on the top of her left foot.  Initially became swollen and quite bruised.  Swelling and bruising has improved but pain has persisted.  Able to bear weight and ambulate.      Also reports occasional episodes of palpitations with associated dizziness.  Symptoms come and go on their own.  Happening approximately once every few weeks.  No specific trigger.  Typically last for a few minutes and resolve.  No significant chest pain or pressure, syncope, headaches, vision change, shortness of breath.    PAST MEDICAL HISTORY:   Past Medical History:   Diagnosis Date     Bunion of great toe of left foot     No Comments Provided     Endometriosis 07/2018    diagnosed laparoscopically       PAST SURGICAL HISTORY:   Past Surgical History:   Procedure Laterality Date     ARTHROSCOPY ANKLE, OPEN REPAIR LIGAMENT, COMBINED Right 08/06/2020    Procedure: Posterior ARTHROSCOPY, ANKLE, WITH OPEN REPAIR OF ANKLE LIGAMENT, resection of os trigonum, FHL tenosyonovectomy;  Surgeon: Sean Salcedo DPM;  Location:  OR     BUNIONECTOMY      4/2005,Left     COLONOSCOPY      7/2013,Normal     CYSTOSCOPY N/A 07/26/2018    Procedure: CYSTOSCOPY;;  Surgeon: Renate Ch MD;  Location:  OR     DENTAL SURGERY      wisdom teeth     FRACTURE SURGERY      6/13,L Foot-tarsometatarsal realignment arthrodesis with plate and screw fixation, capsule tendon  balancing procedures about the first tarsometatarsal joint, Landry Gibson DPM  [facility]     hardware removal Left     7/26/2006,[id number],REMOVAL OF FOREIGN BODY,Removal of harware L foot after bunionectomy [Other][[[[     HYSTERECTOMY, PAP NO LONGER INDICATED       LAPAROSCOPIC HYSTERECTOMY TOTAL, SALPINGECTOMY N/A 07/26/2018    Procedure: LAPAROSCOPIC HYSTERECTOMY TOTAL, SALPINGECTOMY;  Total Laparoscopic Hysterectomy & Bilateral Salpingectomy, Cystoscopy.;  Surgeon: Renate Ch MD;  Location: [facility] OR     TONSILLECTOMY      12/2005       FAMILY HISTORY:   Family History   Problem Relation Age of Onset     Heart Disease Mother         Heart Disease,Aortic stenosis and valve replaced     Family History Negative Father         Good Health     Asthma Maternal Grandmother         Asthma     Heart Disease Maternal Grandfather         Heart Disease     Cerebrovascular Disease Paternal Grandfather        SOCIAL HISTORY:   Social History     Tobacco Use     Smoking status: Every Day     Smokeless tobacco: Never     Tobacco comments:     Less than 0.5 packs a day    Substance Use Topics     Alcohol use: Yes     Alcohol/week: 4.0 standard drinks of alcohol     Types: 4 Standard drinks or equivalent per week        Allergies   Allergen Reactions     Codeine Shortness Of Breath and Hives     itching     Peanut-Containing Drug Products Shortness Of Breath     Hives     Peanuts [Nuts] Shortness Of Breath     Hives     Morphine      Current Outpatient Medications   Medication Sig Dispense Refill     albuterol (PROAIR HFA/PROVENTIL HFA/VENTOLIN HFA) 108 (90 Base) MCG/ACT inhaler Inhale 2 puffs into the lungs every 4 hours as needed for shortness of breath or wheezing 18 g 3     cyclobenzaprine (FLEXERIL) 5 MG tablet Take 5 mg by mouth 3 times daily as needed       fluticasone (FLOVENT HFA) 110 MCG/ACT inhaler Inhale 1 puff into the lungs 2 times daily 12 g 1     HUMIRA *CF* PEN 40 MG/0.4ML pen kit Inject 40 mg  "Subcutaneous every 14 days       ibuprofen (ADVIL/MOTRIN) 600 MG tablet Take 1 tablet (600 mg) by mouth every 8 hours as needed for moderate pain (4-6) 90 tablet 1     nystatin (MYCOSTATIN) 681110 UNIT/ML suspension Take 5 mLs (500,000 Units) by mouth 4 times daily 140 mL 1     sulfaSALAzine (AZULFIDINE) 500 MG tablet TAKE 1 TABLET BY MOUTH TWO TIMES A DAY FOR 7 DAYS, THEN 2 TABLETS TWO TIMES A DAY  DAYS.       No current facility-administered medications for this visit.           Objective    /78   Pulse 63   Temp 97.2  F (36.2  C) (Tympanic)   Resp 20   Ht 1.651 m (5' 5\")   Wt 73.9 kg (163 lb)   LMP 07/01/2018 (LMP Unknown)   SpO2 100%   BMI 27.12 kg/m    Body mass index is 27.12 kg/m .  Physical Exam   General: Pleasant, in no apparent distress.  Cardiovascular: Regular rate and rhythm with S1 equal to S2. No murmurs, friction rubs, or gallops.   Respiratory: Lungs are resonant and clear to auscultation bilaterally. No wheezes, crackles, or rhonchi.  Musculoskeletal: Tenderness palpation throughout the mid left foot, worst over mid to distal first and second metatarsals.  No significant limited range of motion to toes, ankle on left.  Nonantalgic gait in clinic.  Skin: No jaundice, pallor, rashes, or lesions.  Psych: Appropriate mood and affect.    Results for orders placed or performed during the hospital encounter of 11/25/24   XR Foot Left G/E 3 Views     Status: None    Narrative    Exam: XR FOOT LEFT G/E 3 VIEWS     History:Female, age 34 years, Foot injury, left, initial encounter    Comparison:  1/4/2021    Technique: Three views are submitted.    Findings: Bones are normally mineralized. No evidence of acute or  subacute fracture.  No evidence of dislocation.  Postoperative changes  seen consistent with first metatarsophalangeal joint osteotomy and  fusion of the first tarsometatarsal joint.           Impression    Impression:  No evidence of acute or subacute bony " abnormality.    THAD LORENZ MD         SYSTEM ID:  [id number]   Results for orders placed or performed in visit on 11/25/24   Basic Metabolic Panel     Status: Abnormal   Result Value Ref Range    Sodium 141 135 - 145 mmol/L    Potassium 4.0 3.4 - 5.3 mmol/L    Chloride 103 98 - 107 mmol/L    Carbon Dioxide (CO2) 30 (H) 22 - 29 mmol/L    Anion Gap 8 7 - 15 mmol/L    Urea Nitrogen 7.1 6.0 - 20.0 mg/dL    Creatinine 0.72 0.51 - 0.95 mg/dL    GFR Estimate >90 >60 mL/min/1.73m2    Calcium 9.6 8.8 - 10.4 mg/dL    Glucose 92 70 - 99 mg/dL   Magnesium     Status: Normal   Result Value Ref Range    Magnesium 2.0 1.7 - 2.3 mg/dL   TSH Reflex [facility]     Status: Normal   Result Value Ref Range    TSH 2.55 0.30 - 4.20 uIU/mL   CBC with platelets and differential     Status: None   Result Value Ref Range    WBC Count 8.0 4.0 - 11.0 10e3/uL    RBC Count 4.38 3.80 - 5.20 10e6/uL    Hemoglobin 14.1 11.7 - 15.7 g/dL    Hematocrit 42.1 35.0 - 47.0 %    MCV 96 78 - 100 fL    MCH 32.2 26.5 - 33.0 pg    MCHC 33.5 31.5 - 36.5 g/dL    RDW 12.1 10.0 - 15.0 %    Platelet Count 272 150 - 450 10e3/uL    % Neutrophils 64 %    % Lymphocytes 27 %    % Monocytes 8 %    % Eosinophils 1 %    % Basophils 1 %    % Immature Granulocytes 0 %    NRBCs per 100 WBC 0 <1 /100    Absolute Neutrophils 5.1 1.6 - 8.3 10e3/uL    Absolute Lymphocytes 2.2 0.8 - 5.3 10e3/uL    Absolute Monocytes 0.6 0.0 - 1.3 10e3/uL    Absolute Eosinophils 0.0 0.0 - 0.7 10e3/uL    Absolute Basophils 0.1 0.0 - 0.2 10e3/uL    Absolute Immature Granulocytes 0.0 <=0.4 10e3/uL    Absolute NRBCs 0.0 10e3/uL   CBC and Differential     Status: None    Narrative    The following orders were created for panel order CBC and Differential.  Procedure                               Abnormality         Status                     ---------                               -----------         ------                     CBC with platelets and d...[[id number]]                      Final result                  Please view results for these tests on the individual orders.       Signed Electronically by: Andie Topete PA-C    Answers submitted by the patient for this visit:  Patient Health Questionnaire (Submitted on 11/25/2024)  If you checked off any problems, how difficult have these problems made it for you to do your work, take care of things at home, or get along with other people?: Somewhat difficult  PHQ9 TOTAL SCORE: 14

## 2024-11-25 ENCOUNTER — HOSPITAL ENCOUNTER (OUTPATIENT)
Dept: GENERAL RADIOLOGY | Facility: OTHER | Age: 34
Discharge: HOME | End: 2024-11-25
Attending: PHYSICIAN ASSISTANT
Payer: COMMERCIAL

## 2024-11-25 ENCOUNTER — OFFICE VISIT (OUTPATIENT)
Dept: FAMILY MEDICINE | Facility: OTHER | Age: 34
End: 2024-11-25
Attending: PHYSICIAN ASSISTANT
Payer: COMMERCIAL

## 2024-11-25 VITALS
WEIGHT: 163 LBS | RESPIRATION RATE: 20 BRPM | SYSTOLIC BLOOD PRESSURE: 128 MMHG | HEIGHT: 65 IN | OXYGEN SATURATION: 100 % | BODY MASS INDEX: 27.16 KG/M2 | DIASTOLIC BLOOD PRESSURE: 78 MMHG | HEART RATE: 63 BPM | TEMPERATURE: 97.2 F

## 2024-11-25 DIAGNOSIS — S99.922A FOOT INJURY, LEFT, INITIAL ENCOUNTER: Primary | ICD-10-CM

## 2024-11-25 DIAGNOSIS — R00.2 PALPITATIONS: ICD-10-CM

## 2024-11-25 DIAGNOSIS — S99.922A FOOT INJURY, LEFT, INITIAL ENCOUNTER: ICD-10-CM

## 2024-11-25 LAB
ANION GAP SERPL CALCULATED.3IONS-SCNC: 8 MMOL/L (ref 7–15)
BASOPHILS # BLD AUTO: 0.1 10E3/UL (ref 0–0.2)
BASOPHILS NFR BLD AUTO: 1 %
BUN SERPL-MCNC: 7.1 MG/DL (ref 6–20)
CALCIUM SERPL-MCNC: 9.6 MG/DL (ref 8.8–10.4)
CHLORIDE SERPL-SCNC: 103 MMOL/L (ref 98–107)
CREAT SERPL-MCNC: 0.72 MG/DL (ref 0.51–0.95)
EGFRCR SERPLBLD CKD-EPI 2021: >90 ML/MIN/1.73M2
EOSINOPHIL # BLD AUTO: 0 10E3/UL (ref 0–0.7)
EOSINOPHIL NFR BLD AUTO: 1 %
ERYTHROCYTE [DISTWIDTH] IN BLOOD BY AUTOMATED COUNT: 12.1 % (ref 10–15)
GLUCOSE SERPL-MCNC: 92 MG/DL (ref 70–99)
HCO3 SERPL-SCNC: 30 MMOL/L (ref 22–29)
HCT VFR BLD AUTO: 42.1 % (ref 35–47)
HGB BLD-MCNC: 14.1 G/DL (ref 11.7–15.7)
IMM GRANULOCYTES # BLD: 0 10E3/UL
IMM GRANULOCYTES NFR BLD: 0 %
LYMPHOCYTES # BLD AUTO: 2.2 10E3/UL (ref 0.8–5.3)
LYMPHOCYTES NFR BLD AUTO: 27 %
MAGNESIUM SERPL-MCNC: 2 MG/DL (ref 1.7–2.3)
MCH RBC QN AUTO: 32.2 PG (ref 26.5–33)
MCHC RBC AUTO-ENTMCNC: 33.5 G/DL (ref 31.5–36.5)
MCV RBC AUTO: 96 FL (ref 78–100)
MONOCYTES # BLD AUTO: 0.6 10E3/UL (ref 0–1.3)
MONOCYTES NFR BLD AUTO: 8 %
NEUTROPHILS # BLD AUTO: 5.1 10E3/UL (ref 1.6–8.3)
NEUTROPHILS NFR BLD AUTO: 64 %
NRBC # BLD AUTO: 0 10E3/UL
NRBC BLD AUTO-RTO: 0 /100
PLATELET # BLD AUTO: 272 10E3/UL (ref 150–450)
POTASSIUM SERPL-SCNC: 4 MMOL/L (ref 3.4–5.3)
RBC # BLD AUTO: 4.38 10E6/UL (ref 3.8–5.2)
SODIUM SERPL-SCNC: 141 MMOL/L (ref 135–145)
TSH SERPL DL<=0.005 MIU/L-ACNC: 2.55 UIU/ML (ref 0.3–4.2)
WBC # BLD AUTO: 8 10E3/UL (ref 4–11)

## 2024-11-25 PROCEDURE — 85014 HEMATOCRIT: CPT | Mod: ZL | Performed by: PHYSICIAN ASSISTANT

## 2024-11-25 PROCEDURE — 83735 ASSAY OF MAGNESIUM: CPT | Mod: ZL | Performed by: PHYSICIAN ASSISTANT

## 2024-11-25 PROCEDURE — 85049 AUTOMATED PLATELET COUNT: CPT | Mod: ZL | Performed by: PHYSICIAN ASSISTANT

## 2024-11-25 PROCEDURE — 73630 X-RAY EXAM OF FOOT: CPT | Mod: LT

## 2024-11-25 PROCEDURE — 36415 COLL VENOUS BLD VENIPUNCTURE: CPT | Mod: ZL | Performed by: PHYSICIAN ASSISTANT

## 2024-11-25 PROCEDURE — 82310 ASSAY OF CALCIUM: CPT | Mod: ZL | Performed by: PHYSICIAN ASSISTANT

## 2024-11-25 PROCEDURE — 84443 ASSAY THYROID STIM HORMONE: CPT | Mod: ZL | Performed by: PHYSICIAN ASSISTANT

## 2024-11-25 PROCEDURE — 80048 BASIC METABOLIC PNL TOTAL CA: CPT | Mod: ZL | Performed by: PHYSICIAN ASSISTANT

## 2024-11-25 PROCEDURE — 85004 AUTOMATED DIFF WBC COUNT: CPT | Mod: ZL | Performed by: PHYSICIAN ASSISTANT

## 2024-11-25 PROCEDURE — G0463 HOSPITAL OUTPT CLINIC VISIT: HCPCS

## 2024-11-25 ASSESSMENT — PATIENT HEALTH QUESTIONNAIRE - PHQ9
SUM OF ALL RESPONSES TO PHQ QUESTIONS 1-9: 14
SUM OF ALL RESPONSES TO PHQ QUESTIONS 1-9: 14
10. IF YOU CHECKED OFF ANY PROBLEMS, HOW DIFFICULT HAVE THESE PROBLEMS MADE IT FOR YOU TO DO YOUR WORK, TAKE CARE OF THINGS AT HOME, OR GET ALONG WITH OTHER PEOPLE: SOMEWHAT DIFFICULT

## 2024-11-25 ASSESSMENT — PAIN SCALES - GENERAL: PAINLEVEL_OUTOF10: MODERATE PAIN (5)

## 2024-11-25 NOTE — LETTER
My Asthma Action Plan    Name: Yohana Robertson   YOB: 1990  Date: 11/25/2024   My doctor: Andie Topete PA-C   My clinic: [facility]        My Rescue Medicine:   Albuterol inhaler (Proair/Ventolin/Proventil HFA)  2-4 puffs EVERY 4 HOURS as needed. Use a spacer if recommended by your provider.   My Asthma Severity:   Intermittent / Exercise Induced  Know your asthma triggers: upper respiratory infections, pollens, and emotions             GREEN ZONE   Good Control  I feel good  No cough or wheeze  Can work, sleep and play without asthma symptoms       Take your asthma control medicine every day.     If exercise triggers your asthma, take your rescue medication  15 minutes before exercise or sports, and  During exercise if you have asthma symptoms  Spacer to use with inhaler: If you have a spacer, make sure to use it with your inhaler             YELLOW ZONE Getting Worse  I have ANY of these:  I do not feel good  Cough or wheeze  Chest feels tight  Wake up at night   Keep taking your Green Zone medications  Start taking your rescue medicine:  every 20 minutes for up to 1 hour. Then every 4 hours for 24-48 hours.  If you stay in the Yellow Zone for more than 12-24 hours, contact your doctor.  If you do not return to the Green Zone in 12-24 hours or you get worse, start taking your oral steroid medicine if prescribed by your provider.           RED ZONE Medical Alert - Get Help  I have ANY of these:  I feel awful  Medicine is not helping  Breathing getting harder  Trouble walking or talking  Nose opens wide to breathe       Take your rescue medicine NOW  If your provider has prescribed an oral steroid medicine, start taking it NOW  Call your doctor NOW  If you are still in the Red Zone after 20 minutes and you have not reached your doctor:  Take your rescue medicine again and  Call 911 or go to the emergency room right away    See your regular doctor within 2 weeks of an  Emergency Room or Urgent Care visit for follow-up treatment.          Annual Reminders:  Meet with Asthma Educator,  Flu Shot in the Fall, consider Pneumonia Vaccination for patients with asthma (aged 19 and older).    Pharmacy: [id number] ([employer]) - [address]    Electronically signed by Andie Topete PA-C   Date: 11/25/24                    Asthma Triggers  How To Control Things That Make Your Asthma Worse    Triggers are things that make your asthma worse.  Look at the list below to help you find your triggers and   what you can do about them. You can help prevent asthma flare-ups by staying away from your triggers.      Trigger                                                          What you can do   Cigarette Smoke  Tobacco smoke can make asthma worse. Do not allow smoking in your home, car or around you.  Be sure no one smokes at a child s day care or school.  If you smoke, ask your health care provider for ways to help you quit.  Ask family members to quit too.  Ask your health care provider for a referral to Quit Plan to help you quit smoking, or call 3-695-066PLAN.     Colds, Flu, Bronchitis  These are common triggers of asthma. Wash your hands often.  Don t touch your eyes, nose or mouth.  Get a flu shot every year.     Dust Mites  These are tiny bugs that live in cloth or carpet. They are too small to see. Wash sheets and blankets in hot water every week.   Encase pillows and mattress in dust mite proof covers.  Avoid having carpet if you can. If you have carpet, vacuum weekly.   Use a dust mask and HEPA vacuum.   Pollen and Outdoor Mold  Some people are allergic to trees, grass, or weed pollen, or molds. Try to keep your windows closed.  Limit time out doors when pollen count is high.   Ask you health care provider about taking medicine during allergy season.     Animal Dander  Some people are allergic to skin flakes, urine or saliva from pets with fur  or feathers. Keep pets with fur or feathers out of your home.    If you can t keep the pet outdoors, then keep the pet out of your bedroom.  Keep the bedroom door closed.  Keep pets off cloth furniture and away from stuffed toys.     Mice, Rats, and Cockroaches  Some people are allergic to the waste from these pests.   Cover food and garbage.  Clean up spills and food crumbs.  Store grease in the refrigerator.   Keep food out of the bedroom.   Indoor Mold  This can be a trigger if your home has high moisture. Fix leaking faucets, pipes, or other sources of water.   Clean moldy surfaces.  Dehumidify basement if it is damp and smelly.   Smoke, Strong Odors, and Sprays  These can reduce air quality. Stay away from strong odors and sprays, such as perfume, powder, hair spray, paints, smoke incense, paint, cleaning products, candles and new carpet.   Exercise or Sports  Some people with asthma have this trigger. Be active!  Ask your doctor about taking medicine before sports or exercise to prevent symptoms.    Warm up for 5-10 minutes before and after sports or exercise.     Other Triggers of Asthma  Cold air:  Cover your nose and mouth with a scarf.  Sometimes laughing or crying can be a trigger.  Some medicines and food can trigger asthma.

## 2024-11-25 NOTE — NURSING NOTE
"Chief Complaint   Patient presents with    Foot Injury     Left foot/leg injury        Initial /78   Pulse 63   Temp 97.2  F (36.2  C) (Tympanic)   Resp 20   Ht 1.651 m (5' 5\")   Wt 73.9 kg (163 lb)   LMP 07/01/2018 (LMP Unknown)   SpO2 100%   BMI 27.12 kg/m   Estimated body mass index is 27.12 kg/m  as calculated from the following:    Height as of this encounter: 1.651 m (5' 5\").    Weight as of this encounter: 73.9 kg (163 lb).  Medication Review: complete    The next two questions are to help us understand your food security.  If you are feeling you need any assistance in this area, we have resources available to support you today.          6/24/2024   SDOH- Food Insecurity   Within the past 12 months, did you worry that your food would run out before you got money to buy more? N    Within the past 12 months, did the food you bought just not last and you didn t have money to get more? Y        Patient-reported         Health Care Directive:  Patient does not have a Health Care Directive: Discussed advance care planning with patient; however, patient declined at this time.    Lela Conteh LPN      "

## 2024-12-16 ENCOUNTER — OFFICE VISIT (OUTPATIENT)
Dept: FAMILY MEDICINE | Facility: OTHER | Age: 34
End: 2024-12-16
Attending: FAMILY MEDICINE
Payer: COMMERCIAL

## 2024-12-16 VITALS
DIASTOLIC BLOOD PRESSURE: 82 MMHG | TEMPERATURE: 97.4 F | WEIGHT: 164 LBS | RESPIRATION RATE: 16 BRPM | OXYGEN SATURATION: 99 % | HEART RATE: 80 BPM | BODY MASS INDEX: 27.29 KG/M2 | SYSTOLIC BLOOD PRESSURE: 118 MMHG

## 2024-12-16 DIAGNOSIS — M77.50 FOOT TENDINITIS: ICD-10-CM

## 2024-12-16 DIAGNOSIS — Z98.890 STATUS POST LEFT FOOT SURGERY: ICD-10-CM

## 2024-12-16 DIAGNOSIS — M79.672 LEFT FOOT PAIN: Primary | ICD-10-CM

## 2024-12-16 PROCEDURE — G0463 HOSPITAL OUTPT CLINIC VISIT: HCPCS

## 2024-12-16 RX ORDER — MEDROXYPROGESTERONE ACETATE 150 MG/ML
50 INJECTION, SUSPENSION INTRAMUSCULAR WEEKLY
COMMUNITY
Start: 2024-12-02

## 2024-12-16 ASSESSMENT — PAIN SCALES - GENERAL: PAINLEVEL_OUTOF10: SEVERE PAIN (6)

## 2024-12-16 NOTE — PROGRESS NOTES
Sports Medicine Office Note    HPI:  34-year-old female coming in for evaluation of left foot pain.  She has had pain for 6 years, acutely worsening in the last several weeks.  She has an extensive history with this foot including a bunionectomy in 2005, hardware removal in 2006, and a left first TMT realignment arthrodesis with plate and screw fixation in June 2013.  She does report another surgery but is unsure exactly what or when this was for a total of 4 surgeries on this foot.  She has pain over the dorsal aspect of the foot extending down towards the hallux, pain about the second metatarsal head, and pain on the medial aspect of the forefoot.  She rates this pain at 6-7/10 but it can be as bad as 10/10.  She characterized the pain as dull, sharp, stabbing, aching, throbbing, and burning.  She does recall an incident where she dropped a cinder block on her foot and feels that the acute pain may be related to this event.  She also moved and increased activity could also be playing a role.  X-rays were performed by her PCP on 11/25 and were negative for fracture.      EXAM:  /82 (BP Location: Right arm, Patient Position: Sitting, Cuff Size: Adult Regular)   Pulse 80   Temp 97.4  F (36.3  C) (Temporal)   Resp 16   Wt 74.4 kg (164 lb)   LMP 07/01/2018 (LMP Unknown)   SpO2 99%   BMI 27.29 kg/m    MUSCULOSKELETAL EXAM:  LEFT FOOT AND ANKLE  Inspection:  -No gross deformity  -No bruising or swelling  -Scars: Several well-healed surgical scars about the medial forefoot and first MTP joint    Tenderness to palpation of the:  -Fibular head:  Negative  -Fibular shaft:  Negative  -Tibial shaft:  Negative  -Medial malleolus:  Negative  -Deltoid ligament:  Negative  -Lateral malleolus:  Negative  -ATFL:  Negative  -CFL:  Negative  -PTFL:  Negative  -Syndesmosis (AITFL):  Negative  -Midsubstance Achilles tendon:  Negative  -Achilles tendon insertion:  Negative  -Plantar fascial origin on the calcaneus:   Negative  -Base of the fifth metatarsal:  Negative  -Navicular:  Negative  -Lisfranc joint: Mild pain  -Metatarsals: Positive over the second metatarsal, particularly distally  -EHL: Positive from the anterior ankle distally    Strength:  -Dorsiflexion:  5/5  -Plantarflexion:  5/5  -Inversion:  5/5  -Eversion:  5/5    Other:  -Intact sensation to light touch distally.  -No signs of cyanosis. Normal skin temperature.  -Knee:  No gross deformity. Normal motion.  -Right foot/ankle:  No gross deformity. No palpable tenderness. Normal strength.      IMAGIN2024: 3 view left foot x-ray  - No fracture, dislocation, or bony lesion.  Hardware from previous first TMT fusion is noted without signs of hardware fracture/loosening.      ASSESSMENT/PLAN:  Diagnoses and all orders for this visit:  Left foot pain  -     Ankle/Foot Bracing Supplies Order Walking Boot; Left; Pneumatic; Short  Foot tendinitis  -     Orthopedic  Referral  -     Ankle/Foot Bracing Supplies Order Walking Boot; Left; Pneumatic; Short  Status post left foot surgery    34-year-old female with left foot pain likely due to several etiologies.  She has a complex history regarding this foot with numerous surgeries.  The most bothersome pain seems to be about the second metatarsal.  Differential includes bony contusion, tendinopathy, Freiberg disease, metatarsalgia, Russell's neuroma, intermetatarsal bursitis, etc.  Given her extensive history of surgery, I do suspect that scar tissue is a factor in her ongoing pain.  Though there may be an indication for surgical management in the future, I feel at this time it would be wise to exhaust nonsurgical treatment options.  Because some of her pain may be due to overuse and Freiberg disease is a consideration, I feel immobilization is reasonable.  We also discussed proceeding with physical therapy.  - Patient placed in a walking boot for 4-6 weeks  - Follow-up in the office following this.  For  reevaluation  - Ice and OTC analgesics as needed  - Depending on reevaluation, consider referral to PT      Dane Lopez MD  12/16/2024  11:14 AM    Total time spent with this patient was 36 minutes which included chart review, visualization and independent interpretation of images, time spent with the patient, and documentation.    Procedure time:  0 minute(s)

## 2025-01-14 ENCOUNTER — MEDICAL CORRESPONDENCE (OUTPATIENT)
Dept: HEALTH INFORMATION MANAGEMENT | Facility: OTHER | Age: 35
End: 2025-01-14
Payer: COMMERCIAL

## 2025-01-28 ENCOUNTER — TRANSFERRED RECORDS (OUTPATIENT)
Dept: HEALTH INFORMATION MANAGEMENT | Facility: OTHER | Age: 35
End: 2025-01-28
Payer: COMMERCIAL

## 2025-02-11 ENCOUNTER — HOSPITAL ENCOUNTER (OUTPATIENT)
Dept: MRI IMAGING | Facility: OTHER | Age: 35
Discharge: HOME | End: 2025-02-11
Attending: PHYSICAL MEDICINE & REHABILITATION
Payer: COMMERCIAL

## 2025-02-11 DIAGNOSIS — M51.362 DEGENERATION OF INTERVERTEBRAL DISC OF LUMBAR REGION WITH DISCOGENIC BACK PAIN AND LOWER EXTREMITY PAIN: ICD-10-CM

## 2025-02-11 DIAGNOSIS — M54.6 CHRONIC MIDLINE THORACIC BACK PAIN: ICD-10-CM

## 2025-02-11 DIAGNOSIS — M45.9 ANKYLOSING SPONDYLITIS (H): ICD-10-CM

## 2025-02-11 DIAGNOSIS — G89.29 CHRONIC NECK PAIN: ICD-10-CM

## 2025-02-11 DIAGNOSIS — G89.29 CHRONIC MIDLINE THORACIC BACK PAIN: ICD-10-CM

## 2025-02-11 DIAGNOSIS — M54.2 CHRONIC NECK PAIN: ICD-10-CM

## 2025-02-11 PROCEDURE — 72148 MRI LUMBAR SPINE W/O DYE: CPT

## 2025-02-11 PROCEDURE — 72146 MRI CHEST SPINE W/O DYE: CPT

## 2025-02-11 PROCEDURE — 72141 MRI NECK SPINE W/O DYE: CPT

## 2025-03-07 ENCOUNTER — TRANSFERRED RECORDS (OUTPATIENT)
Dept: HEALTH INFORMATION MANAGEMENT | Facility: OTHER | Age: 35
End: 2025-03-07
Payer: COMMERCIAL

## 2025-05-26 ENCOUNTER — PATIENT OUTREACH (OUTPATIENT)
Dept: CARE COORDINATION | Facility: CLINIC | Age: 35
End: 2025-05-26
Payer: COMMERCIAL

## 2025-08-09 ENCOUNTER — HEALTH MAINTENANCE LETTER (OUTPATIENT)
Age: 35
End: 2025-08-09

## (undated) DEVICE — SOL WATER 1500ML

## (undated) DEVICE — CAST PLASTER SPLINT 5X30" 7395

## (undated) DEVICE — Device

## (undated) DEVICE — PAD PERI INDIV WRAP 11" 2022A

## (undated) DEVICE — COVER LIGHT HANDLE LT-F02

## (undated) DEVICE — GLOVE ESTEEM POWDER FREE SMT 6.5  2D72PT65

## (undated) DEVICE — PAD CHUX UNDERPAD 30X36" P3036C

## (undated) DEVICE — DRAPE STERI U 1015

## (undated) DEVICE — DECANTER VIAL 2006S

## (undated) DEVICE — PANTIES MESH LG/XLG 2PK 706M2

## (undated) DEVICE — GLOVE PROTEXIS PI ORTHO PF 8.0 2D73HT80

## (undated) DEVICE — CAST PADDING 4" COTTON WEBRIL UNSTERILE 9084

## (undated) DEVICE — PREP SKIN SCRUB TRAY 4461A

## (undated) DEVICE — ESU HOLDER LAP INST DISP PURPLE LONG 330MM H-PRO-330

## (undated) DEVICE — LEGGINGS 31X48" LF KM89408

## (undated) DEVICE — PACK LAPAROSCOPY LF SBA15LPFCA

## (undated) DEVICE — SU VICRYL 0 UR-6 27" J603H

## (undated) DEVICE — ABLATOR ARTHREX APOLLO RF ASPIRATING 50DEG AR-9815

## (undated) DEVICE — ANTIFOG SOLUTION W/FOAM PAD CF-1001

## (undated) DEVICE — SUTURE 3-0 PROLENE FS-1 631G

## (undated) DEVICE — ESU GROUND PAD ADULT W/CORD E7507

## (undated) DEVICE — DRAPE MAYO STAND 23X54 8337

## (undated) DEVICE — TOURNIQUET SGL  BLADDER 30"X4" BLUE 5921030135

## (undated) DEVICE — KIT PATIENT POSITIONING PIGAZZI LATEX FREE 40580

## (undated) DEVICE — DRAPE SHEET REV FOLD 3/4 9349

## (undated) DEVICE — GLOVE PROTEXIS BLUE W/NEU-THERA 6.5  2D73EB65

## (undated) DEVICE — PREP CHLORAPREP 26ML TINTED ORANGE  260815

## (undated) DEVICE — ENDO TROCAR FIRST ENTRY KII FIOS ADV FIX 05X100MM CFF03

## (undated) DEVICE — DEVICE ENDO STITCH APPLIER 10MM 173016

## (undated) DEVICE — GEL ULTRASOUND AQUASONIC 20GM 01-01

## (undated) DEVICE — GLOVE PROTEXIS BLUE W/NEU-THERA 7.5  2D73EB75

## (undated) DEVICE — SYR 10ML FINGER CONTROL W/O NDL 309695

## (undated) DEVICE — ENDO CLOSING KIT ENDOCLOSE 173022

## (undated) DEVICE — DISTRACTOR STRAP ANKLE ARTHRO AR-1712

## (undated) DEVICE — KIT ARTHROSCOPIC SURGICAL PROCEDURE DISP AR-8990DS

## (undated) DEVICE — PACK KNEE ARTHROSCOPY CUSTOM SOP15KAFCA

## (undated) DEVICE — DRAPE STERI TOWEL LG 1010

## (undated) DEVICE — ENDO TROCAR FIRST ENTRY KII FIOS ADV FIX 12X100MM CFF73

## (undated) DEVICE — VERRES NEEDLE 120MM DISPOSABLE 12/BX

## (undated) DEVICE — BNDG ELASTIC 4" DBL LENGTH UNSTERILE 6611-14

## (undated) DEVICE — BUR ARTHREX COOLCUT DISSECTOR 3.5MM  AR-8350DS

## (undated) DEVICE — TUBING SUCTION 10'X3/16" N510

## (undated) DEVICE — RETR ELEV / UTERINE MANIPULATOR V-CARE MED CUP 60-6085-201

## (undated) DEVICE — SU WND CLOSURE VLOC 180 ABS 2-0 8" VLOCA208L

## (undated) DEVICE — DRSG JUMPSTART ANTIMICROBIAL 1.5X8" ABS-4005

## (undated) DEVICE — SYR 50ML LL W/O NDL 309653

## (undated) DEVICE — DRSG MEDIPORE 2X2 3/4" 3562

## (undated) DEVICE — BLADE KNIFE SURG 15 371115

## (undated) DEVICE — GLOVE BIOGEL PI INDICATOR 8.0 LF 41680

## (undated) DEVICE — TRAY 16FR CATH W/URINE METER SILVER 903116

## (undated) DEVICE — TUBING INSUFFLATOR W/FILTER OLYMPUS WA95005A

## (undated) DEVICE — DRAPE UNDER BUTTOCK W/FLUID POUCH

## (undated) DEVICE — DRAPE ABDOMINAL LITHOTOMY DYNJP9004

## (undated) DEVICE — TUBING IRRIG TUR Y TYPE 96" LF 6543-01

## (undated) DEVICE — SU MONOCRYL 3-0 PS-2 18" UND Y497G

## (undated) DEVICE — SUCTION STRYKERFLOW II 250-070-500

## (undated) DEVICE — SLEEVE COMPRESSION SCD KNEE MED 74022

## (undated) DEVICE — SU VICRYL 2-0 SH 27" UND J417H

## (undated) DEVICE — LIGHT HANDLES PLASTIC

## (undated) DEVICE — SYSTEM CLEARIFY VISUALIZATION 21-345

## (undated) DEVICE — KOH COLPOTOMIZER OCCLUDER  CPO-6

## (undated) DEVICE — FASTENER LEGBAND CATHETER DALE 316

## (undated) DEVICE — SU PROLENE 3-0 FS-1 18" 8684G

## (undated) DEVICE — DRSG GAUZE 4X4" TRAY 6939

## (undated) DEVICE — JELLY LUBRICATING SURGILUBE 2OZ TUBE

## (undated) DEVICE — GLOVE BIOGEL 7.5 LATEX

## (undated) DEVICE — DRSG MEDIPORE 3 1/2X4" 3566

## (undated) DEVICE — TROCAR KII SLEEVE 5MM X 100MM 12/BX

## (undated) DEVICE — TUBING ARTHRO CONMED/LINVATEC PUMP BLUE INFLOW 10K100

## (undated) RX ORDER — BUPIVACAINE HYDROCHLORIDE AND EPINEPHRINE 5; 5 MG/ML; UG/ML
INJECTION, SOLUTION EPIDURAL; INTRACAUDAL; PERINEURAL
Status: DISPENSED
Start: 2018-07-26

## (undated) RX ORDER — PROPOFOL 10 MG/ML
INJECTION, EMULSION INTRAVENOUS
Status: DISPENSED
Start: 2020-08-06

## (undated) RX ORDER — PANTOPRAZOLE SODIUM 40 MG/10ML
INJECTION, POWDER, LYOPHILIZED, FOR SOLUTION INTRAVENOUS
Status: DISPENSED
Start: 2019-07-10

## (undated) RX ORDER — KETOROLAC TROMETHAMINE 30 MG/ML
INJECTION, SOLUTION INTRAMUSCULAR; INTRAVENOUS
Status: DISPENSED
Start: 2020-02-26

## (undated) RX ORDER — KETAMINE HYDROCHLORIDE 50 MG/ML
INJECTION, SOLUTION INTRAMUSCULAR; INTRAVENOUS
Status: DISPENSED
Start: 2018-07-26

## (undated) RX ORDER — PROPOFOL 10 MG/ML
INJECTION, EMULSION INTRAVENOUS
Status: DISPENSED
Start: 2018-07-26

## (undated) RX ORDER — FENTANYL CITRATE 50 UG/ML
INJECTION, SOLUTION INTRAMUSCULAR; INTRAVENOUS
Status: DISPENSED
Start: 2018-07-26

## (undated) RX ORDER — BUPIVACAINE HYDROCHLORIDE 5 MG/ML
INJECTION, SOLUTION EPIDURAL; INTRACAUDAL
Status: DISPENSED
Start: 2022-04-21

## (undated) RX ORDER — DEXAMETHASONE SODIUM PHOSPHATE 4 MG/ML
INJECTION, SOLUTION INTRA-ARTICULAR; INTRALESIONAL; INTRAMUSCULAR; INTRAVENOUS; SOFT TISSUE
Status: DISPENSED
Start: 2020-08-06

## (undated) RX ORDER — DEXAMETHASONE SODIUM PHOSPHATE 4 MG/ML
INJECTION, SOLUTION INTRA-ARTICULAR; INTRALESIONAL; INTRAMUSCULAR; INTRAVENOUS; SOFT TISSUE
Status: DISPENSED
Start: 2018-07-26

## (undated) RX ORDER — CEFAZOLIN SODIUM 2 G/100ML
INJECTION, SOLUTION INTRAVENOUS
Status: DISPENSED
Start: 2018-07-26

## (undated) RX ORDER — LIDOCAINE HYDROCHLORIDE 20 MG/ML
INJECTION, SOLUTION EPIDURAL; INFILTRATION; INTRACAUDAL; PERINEURAL
Status: DISPENSED
Start: 2018-07-26

## (undated) RX ORDER — EPINEPHRINE 1 MG/ML(1)
AMPUL (ML) INJECTION
Status: DISPENSED
Start: 2020-08-06

## (undated) RX ORDER — ALUMINA, MAGNESIA, AND SIMETHICONE 2400; 2400; 240 MG/30ML; MG/30ML; MG/30ML
SUSPENSION ORAL
Status: DISPENSED
Start: 2019-07-10

## (undated) RX ORDER — KETOROLAC TROMETHAMINE 30 MG/ML
INJECTION, SOLUTION INTRAMUSCULAR; INTRAVENOUS
Status: DISPENSED
Start: 2018-08-20

## (undated) RX ORDER — ASPIRIN 81 MG/1
TABLET, CHEWABLE ORAL
Status: DISPENSED
Start: 2019-07-10

## (undated) RX ORDER — ONDANSETRON 2 MG/ML
INJECTION INTRAMUSCULAR; INTRAVENOUS
Status: DISPENSED
Start: 2020-08-06

## (undated) RX ORDER — KETOROLAC TROMETHAMINE 30 MG/ML
INJECTION, SOLUTION INTRAMUSCULAR; INTRAVENOUS
Status: DISPENSED
Start: 2018-07-26

## (undated) RX ORDER — ONDANSETRON 2 MG/ML
INJECTION INTRAMUSCULAR; INTRAVENOUS
Status: DISPENSED
Start: 2019-07-10

## (undated) RX ORDER — GLYCOPYRROLATE 0.2 MG/ML
INJECTION, SOLUTION INTRAMUSCULAR; INTRAVENOUS
Status: DISPENSED
Start: 2020-08-06

## (undated) RX ORDER — DEXAMETHASONE SODIUM PHOSPHATE 10 MG/ML
INJECTION, SOLUTION INTRAMUSCULAR; INTRAVENOUS
Status: DISPENSED
Start: 2020-08-06

## (undated) RX ORDER — ACETAMINOPHEN 10 MG/ML
INJECTION, SOLUTION INTRAVENOUS
Status: DISPENSED
Start: 2018-07-26

## (undated) RX ORDER — SODIUM CHLORIDE 9 MG/ML
INJECTION, SOLUTION INTRAVENOUS
Status: DISPENSED
Start: 2019-07-10

## (undated) RX ORDER — SODIUM CHLORIDE, SODIUM LACTATE, POTASSIUM CHLORIDE, CALCIUM CHLORIDE 600; 310; 30; 20 MG/100ML; MG/100ML; MG/100ML; MG/100ML
INJECTION, SOLUTION INTRAVENOUS
Status: DISPENSED
Start: 2018-07-26

## (undated) RX ORDER — PHENAZOPYRIDINE HYDROCHLORIDE 100 MG/1
TABLET, FILM COATED ORAL
Status: DISPENSED
Start: 2018-07-26

## (undated) RX ORDER — LIDOCAINE HYDROCHLORIDE 20 MG/ML
SOLUTION OROPHARYNGEAL
Status: DISPENSED
Start: 2019-07-10

## (undated) RX ORDER — FENTANYL CITRATE 50 UG/ML
INJECTION, SOLUTION INTRAMUSCULAR; INTRAVENOUS
Status: DISPENSED
Start: 2020-08-06

## (undated) RX ORDER — LIDOCAINE HYDROCHLORIDE 20 MG/ML
INJECTION, SOLUTION EPIDURAL; INFILTRATION; INTRACAUDAL; PERINEURAL
Status: DISPENSED
Start: 2020-08-06

## (undated) RX ORDER — NEOSTIGMINE METHYLSULFATE 0.5 MG/ML
INJECTION INTRAVENOUS
Status: DISPENSED
Start: 2018-07-26

## (undated) RX ORDER — BUPIVACAINE HYDROCHLORIDE 5 MG/ML
INJECTION, SOLUTION EPIDURAL; INTRACAUDAL
Status: DISPENSED
Start: 2020-08-06

## (undated) RX ORDER — ONDANSETRON 2 MG/ML
INJECTION INTRAMUSCULAR; INTRAVENOUS
Status: DISPENSED
Start: 2018-07-26

## (undated) RX ORDER — FUROSEMIDE 10 MG/ML
INJECTION INTRAMUSCULAR; INTRAVENOUS
Status: DISPENSED
Start: 2018-07-26

## (undated) RX ORDER — KETOROLAC TROMETHAMINE 30 MG/ML
INJECTION, SOLUTION INTRAMUSCULAR; INTRAVENOUS
Status: DISPENSED
Start: 2020-08-06

## (undated) RX ORDER — TRIAMCINOLONE ACETONIDE 40 MG/ML
INJECTION, SUSPENSION INTRA-ARTICULAR; INTRAMUSCULAR
Status: DISPENSED
Start: 2022-04-21

## (undated) RX ORDER — HYDROMORPHONE HYDROCHLORIDE 2 MG/ML
INJECTION, SOLUTION INTRAMUSCULAR; INTRAVENOUS; SUBCUTANEOUS
Status: DISPENSED
Start: 2018-07-26

## (undated) RX ORDER — GLYCOPYRROLATE 0.2 MG/ML
INJECTION, SOLUTION INTRAMUSCULAR; INTRAVENOUS
Status: DISPENSED
Start: 2018-07-26

## (undated) RX ORDER — CEFAZOLIN SODIUM 2 G/100ML
INJECTION, SOLUTION INTRAVENOUS
Status: DISPENSED
Start: 2020-08-06

## (undated) RX ORDER — NEOSTIGMINE METHYLSULFATE 0.5 MG/ML
INJECTION INTRAVENOUS
Status: DISPENSED
Start: 2020-08-06

## (undated) RX ORDER — LIDOCAINE HYDROCHLORIDE 10 MG/ML
INJECTION, SOLUTION EPIDURAL; INFILTRATION; INTRACAUDAL; PERINEURAL
Status: DISPENSED
Start: 2018-07-26

## (undated) RX ORDER — SUCRALFATE 1 G/1
TABLET ORAL
Status: DISPENSED
Start: 2019-07-10

## (undated) RX ORDER — LIDOCAINE HYDROCHLORIDE 10 MG/ML
INJECTION, SOLUTION EPIDURAL; INFILTRATION; INTRACAUDAL; PERINEURAL
Status: DISPENSED
Start: 2022-04-21